# Patient Record
Sex: FEMALE | Race: WHITE | NOT HISPANIC OR LATINO | Employment: OTHER | URBAN - METROPOLITAN AREA
[De-identification: names, ages, dates, MRNs, and addresses within clinical notes are randomized per-mention and may not be internally consistent; named-entity substitution may affect disease eponyms.]

---

## 2017-03-07 ENCOUNTER — MYC MEDICAL ADVICE (OUTPATIENT)
Dept: FAMILY MEDICINE | Facility: CLINIC | Age: 62
End: 2017-03-07

## 2017-04-24 ENCOUNTER — MYC MEDICAL ADVICE (OUTPATIENT)
Dept: FAMILY MEDICINE | Facility: CLINIC | Age: 62
End: 2017-04-24

## 2017-04-24 NOTE — TELEPHONE ENCOUNTER
Routing to VIKTORIYA Guerra, .    Gaby-Please review and advise.    Thank you!  EDUIN SolisN, RN

## 2017-07-14 ENCOUNTER — MYC MEDICAL ADVICE (OUTPATIENT)
Dept: FAMILY MEDICINE | Facility: CLINIC | Age: 62
End: 2017-07-14

## 2017-07-14 DIAGNOSIS — H26.9 CATARACT, UNSPECIFIED CATARACT TYPE, UNSPECIFIED LATERALITY: Primary | ICD-10-CM

## 2017-07-14 NOTE — TELEPHONE ENCOUNTER
Writer responded as per below.    Dr. Garsia-Please review and advise.  Writer pended ophthalmology referral.    Thank you!  EDUIN SolisN, RN

## 2017-07-17 NOTE — TELEPHONE ENCOUNTER
RN - can you forward this to referral rep.  I can't seem to find a  Referral pool.      Perla Garsia MD

## 2017-07-18 NOTE — TELEPHONE ENCOUNTER
Referrals-  Can you follow up directly with pt regarding this referral info. Seems like you can respond to pt on mychart.  Thanks,  DONNA Holden

## 2017-07-18 NOTE — TELEPHONE ENCOUNTER
"Pt's insurance is [facility] and this is Open Access. She can self refer to Dr.Thomas Lozano at [facility] but we should not submit an approved order, from Dr Garsia because both provider and clinic is not in [facility]. Please direct pt's care to Dr Garsia's recommendation. \" I personally recommend [facility].  I use them a lot.\"    Jessa Chavez,  Referral Rep  "

## 2017-07-18 NOTE — TELEPHONE ENCOUNTER
"Spoke with pt. She acknowledge the MyChart message from Dr Garsia and will follow up with her recommendation to see someone at \"[facility]\" and cancel her appt with Dr Ulysses Lozano at [facility].  "

## 2017-07-20 ENCOUNTER — OFFICE VISIT (OUTPATIENT)
Dept: FAMILY MEDICINE | Facility: CLINIC | Age: 62
End: 2017-07-20
Payer: COMMERCIAL

## 2017-07-20 VITALS
BODY MASS INDEX: 31.03 KG/M2 | SYSTOLIC BLOOD PRESSURE: 123 MMHG | HEIGHT: 65 IN | HEART RATE: 86 BPM | RESPIRATION RATE: 12 BRPM | OXYGEN SATURATION: 98 % | TEMPERATURE: 98.5 F | DIASTOLIC BLOOD PRESSURE: 75 MMHG | WEIGHT: 186.25 LBS

## 2017-07-20 DIAGNOSIS — G57.62 MORTON'S NEUROMA OF SECOND INTERSPACE OF LEFT FOOT: ICD-10-CM

## 2017-07-20 DIAGNOSIS — D17.1 LIPOMA OF ANTERIOR CHEST WALL: Primary | ICD-10-CM

## 2017-07-20 PROCEDURE — 99213 OFFICE O/P EST LOW 20 MIN: CPT | Performed by: FAMILY MEDICINE

## 2017-07-20 NOTE — NURSING NOTE
"Chief Complaint   Patient presents with     Musculoskeletal Problem       Initial /75 (BP Location: Right arm, Patient Position: Chair, Cuff Size: Adult Regular)  Pulse 86  Temp 98.5  F (36.9  C) (Oral)  Resp 12  Ht 5' 4.5\" (1.638 m)  Wt 186 lb 4 oz (84.5 kg)  LMP 03/01/2008  SpO2 98%  Breastfeeding? No  BMI 31.48 kg/m2 Estimated body mass index is 31.48 kg/(m^2) as calculated from the following:    Height as of this encounter: 5' 4.5\" (1.638 m).    Weight as of this encounter: 186 lb 4 oz (84.5 kg).  Medication Reconciliation: complete     Graciela Rubio MA      "

## 2017-07-20 NOTE — PROGRESS NOTES
"  SUBJECTIVE:                                                    Nenita Goins is a 62 year old female who presents to clinic today for the following health issues:      Musculoskeletal problem/pain      Duration:  Since last fall.    Description  Location: Left foot    Intensity:  Moderate discomfort    Accompanying signs and symptoms: 2nd and 3rd toes have spread apart - especially when standing    History  Previous similar problem: no   Previous evaluation:  none    Precipitating or alleviating factors:  Trauma or overuse: no   Aggravating factors include: weight-bearing    Therapies tried and outcome: shiatsu therapy and rub and massage.       Lipoma on left chest overlying upper inner quadrant of left breast  Breast clinic has previously told her it was a lipoma.  Concerned that it is growing/enlarging      Problem list and histories reviewed & adjusted, as indicated.  Additional history: as documented      BP Readings from Last 3 Encounters:   07/20/17 123/75   10/27/16 122/80   02/11/16 131/79    Wt Readings from Last 3 Encounters:   07/20/17 186 lb 4 oz (84.5 kg)   10/27/16 185 lb (83.9 kg)   02/02/16 182 lb (82.6 kg)            Reviewed and updated as needed this visit by clinical staff  Tobacco  Meds             OBJECTIVE:     /75 (BP Location: Right arm, Patient Position: Chair, Cuff Size: Adult Regular)  Pulse 86  Temp 98.5  F (36.9  C) (Oral)  Resp 12  Ht 5' 4.5\" (1.638 m)  Wt 186 lb 4 oz (84.5 kg)  LMP 03/01/2008  SpO2 98%  Breastfeeding? No  BMI 31.48 kg/m2  Body mass index is 31.48 kg/(m^2).  GEN:  no apparent distress  SKIN: superficial (dermal), soft, mobile mass overlying left breast upper inner quadrant - approx silver-dollar sized  FOOT:  left foot with no swelling, no warmth, no redness.  There is focal tenderness to palpation in the distal 2nd intermetatarsal space.  There is wide spread of 2nd and 3rd toes when she stands.       ASSESSMENT/PLAN:       1. Lipoma of anterior " chest wall  Discussed that this is a common finding and not a breast finding but given that it is enlarging I will have her see Derm.  - DERMATOLOGY REFERRAL    2. 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  Referred to podiatry.   - PODIATRY/FOOT & ANKLE SURGERY REFERRAL      Perla Garsia MD  [facility]

## 2017-07-20 NOTE — MR AVS SNAPSHOT
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 - [place] [phone]   [email or web address]    Please be aware that coverage of these services is subject to the terms and limitations of your health insurance plan.  Call member services at your health plan with any benefit or coverage questions.      Please bring the following to your appointment:  Any x-rays, CTs or MRIs which have been performed.  Contact the facility where they were done to arrange for  prior to your scheduled appointment.  Any new CT, MRI or other procedures ordered by your specialist must be performed at a [place] facility or coordinated by your clinic's referral office.    List of current medications   This referral request   Any documents/labs given to you for this referral            PODIATRY/FOOT & ANKLE SURGERY REFERRAL       Your provider has referred you to: [facility]: [facility] [phone]   [email or web address]    Please be aware that coverage of these services is subject to the terms and limitations of your health insurance plan.  Call member services at your health plan with any benefit or coverage questions.      Please bring the following to your appointment:  >>   Any x-rays, CTs or MRIs which have been performed.  Contact the facility where they were  "done to arrange for  prior to your scheduled appointment.  Any new CT, MRI or other procedures ordered by your specialist must be performed at a [facility] facility or coordinated by your clinic's referral office.    >>   List of current medications   >>   This referral request   >>   Any documents/labs given to you for this referral                  Who to contact     If you have questions or need follow up information about today's clinic visit or your schedule please contact [facility] directly at [phone].  Normal or non-critical lab and imaging results will be communicated to you by [employer], letter or phone within 4 business days after the clinic has received the results. If you do not hear from us within 7 days, please contact the clinic through [employer] or phone. If you have a critical or abnormal lab result, we will notify you by phone as soon as possible.  Submit refill requests through [employer] or call your pharmacy and they will forward the refill request to us. Please allow 3 business days for your refill to be completed.          Additional Information About Your Visit        [employer] Information     [employer] gives you secure access to your electronic health record. If you see a primary care provider, you can also send messages to your care team and make appointments. If you have questions, please call your primary care clinic.  If you do not have a primary care provider, please call [phone] and they will assist you.        Care EveryWhere ID     This is your Care EveryWhere ID. This could be used by other organizations to access your [facility] medical records  [id number]        Your Vitals Were     Pulse Temperature Respirations Height Last Period Pulse Oximetry    86 98.5  F (36.9  C) (Oral) 12 5' 4.5\" (1.638 m) 03/01/2008 98%    Breastfeeding? BMI (Body Mass Index)                No 31.48 kg/m2           Blood Pressure from Last 3 Encounters:   07/20/17 123/75   10/27/16 " 122/80   02/11/16 131/79    Weight from Last 3 Encounters:   07/20/17 186 lb 4 oz (84.5 kg)   10/27/16 185 lb (83.9 kg)   02/02/16 182 lb (82.6 kg)              We Performed the Following     DERMATOLOGY REFERRAL     PODIATRY/FOOT & ANKLE SURGERY REFERRAL        Primary Care Provider Office Phone # Fax #    Perlajosé miguel Garsia [phone]       [facility] [address]  [facility] [address]        Equal Access to Services     [facility]: Hadii aad ku hadasho Soomaali, waaxda luqadaha, qaybta kaalmada adeegyada, waxleena puga . So [facility] [phone].    ATENCIÓN: Si habla español, tiene a rogers disposición servicios gratuitos de asistencia lingüística. Yoselyn al [phone].    We comply with applicable federal civil rights laws and [place] laws. We do not discriminate on the basis of race, color, national origin, age, disability sex, sexual orientation or gender identity.            Thank you!     Thank you for choosing [facility]  for your care. Our goal is always to provide you with excellent care. Hearing back from our patients is one way we can continue to improve our services. Please take a few minutes to complete the written survey that you may receive in the mail after your visit with us. Thank you!             Your Updated Medication List - Protect others around you: Learn how to safely use, store and throw away your medicines at www.disposemymeds.org.          This list is accurate as of: 7/20/17 10:18 AM.  Always use your most recent med list.                   Brand Name Dispense Instructions for use Diagnosis    calcium & magnesium carbonates 311-232 MG Tabs           COENZYME Q-10 PO      Take  by mouth.        DANDELION ROOT PO           magnesium citrate solution      Take 200 mLs by mouth once        zinc sulfate 220 (50 ZN) MG capsule    ZINCATE     Take 50 mg by mouth daily

## 2017-08-02 ENCOUNTER — TRANSFERRED RECORDS (OUTPATIENT)
Dept: HEALTH INFORMATION MANAGEMENT | Facility: CLINIC | Age: 62
End: 2017-08-02

## 2017-08-03 ENCOUNTER — OFFICE VISIT (OUTPATIENT)
Dept: PODIATRY | Facility: CLINIC | Age: 62
End: 2017-08-03
Payer: COMMERCIAL

## 2017-08-03 VITALS — WEIGHT: 186 LBS | BODY MASS INDEX: 30.99 KG/M2 | HEIGHT: 65 IN | HEART RATE: 78 BPM

## 2017-08-03 DIAGNOSIS — M77.8 CAPSULITIS OF LEFT FOOT: Primary | ICD-10-CM

## 2017-08-03 DIAGNOSIS — M20.12 HALLUX ABDUCTO VALGUS, LEFT: ICD-10-CM

## 2017-08-03 DIAGNOSIS — M20.62 TOE DEFORMITY, LEFT: ICD-10-CM

## 2017-08-03 PROCEDURE — 99243 OFF/OP CNSLTJ NEW/EST LOW 30: CPT | Performed by: PODIATRIST

## 2017-08-03 NOTE — PATIENT INSTRUCTIONS
DR. MARTINEZ'S CLINIC LOCATIONS     MONDAY / FRIDAY - [facility] WEDNESDAY - [place]   [address] [place] [address] [address] [place] [address] D[place]IO Santos [address]   [phone] / [phone] / [phone]       THURSDAY - [place] SCHEDULE SURGERY: [phone]   [address] APPOINTMENTS: [phone]   [place] [address] AFTER HOURS: [phone]   [phone] / [phone] BILLING QUESTIONS: [phone]      ZIMMERMAN'S NEUROMA   Zimmerman's neuroma is an enlargement or thickening of a nerve in the foot. It is also sometimes referred to as an intermetatarsal neuroma, interdigital neuroma, Zimmerman's metatarsalgia (pain in the metatarsal head area), akila-neural fibrosis (scar tissue around a nerve) or entrapment neuropathy (abnormal nerve due to compression). A Zimmerman's neuroma most commonly occurs in the third interspace between the third and fourth toes, followed by the second interspace between the second and third toes. Zimmerman's neuromas have also occurred in the fourth and first interspaces, but these are rare. If you have a Zimmerman's neuroma, there is a 15% chance it will occur bilaterally (on both feet). Zimmerman's neuromas occur most commonly in women who are between 30 to 50 years old. The reason they are more common in women is thought to be due to the shoes women wear.   CAUSES: A Zimmerman's neuroma is thought to be caused by trauma to the nerve, but scientists are still not sure about the exact cause of the trauma. The trauma may be caused by the metatarsal heads, the deep transverse intermetatarsal ligament (holds the metatarsal heads together) or an intermetatarsal bursa (fluid-filled sac). All of these structures can cause compression/trauma on the nerve which initially causes swelling and injury in the nerve. Over time if the compression/trauma continues, the nerve repairs itself with very fibrous tissue that leads to enlargement and thickening of the nerve. Other causes  "of trauma to the nerve may include; overpronation (foot rolls inward), hypermobility (too much motion), cavo varus (high arch foot) and excessive dorsiflexion (toes bend upward) of the toes. These biomechanical (howthe foot moves) factors may cause trauma to the nerve with every step. If the nerve becomes irritated and enlarged then it takes up more space and gets even more compressed and irritated. It becomes a vicious cycle.   SIGNS & SYMPTOMS  - Pain (sharp, stabbing, throbbing, shooting)   - Numbness   - Tingling or \"pins & needles\"   - Burning   - Cramping   - Feeling that you are stepping on something or that something is in your shoe   - Initially the symptoms may happen once in a while, but as the condition gets     worse, the symptoms may happen all of the time   - It usually feels better by taking off your shoe and massaging your foot     DIAGNOSIS: Your podiatrist will ask many questions about your signs and symptoms and will perform a physical exam. Some of the exams may include a web space compression test. This is done by squeezing the metatarsals together with one hand and using the thumb and index finger of the other hand to compress the affected web space to reproduce the pain/symptoms. A palpable click (Shaun's click) is usually present. This test may also cause pain to shoot into the toes and that is called a Tinel's sign. Reed's test involves squeezing the metatarsals together and moving the toes up and down for 30 seconds. This will usually cause pain or it will bring on your other symptoms. Parson's sign is positive when you stand and the affected toes spread apart. A Zimmerman's neuroma is usually diagnosed based on the history and physical exam findings, but sometimes other tests such as an x-ray, ultrasound or an MRI are needed.   TREATMENT  1.  Footwear Changes: Wear shoes that are wide and deep in the toe box so they  do not put pressure on your toes and metatarsals. Avoid wearing high " heels because they cause increased pressure on the ball of your foot (forefoot).    2.  Metatarsal Pads: These help to lift and separate the metatarsal heads to take pressure off of the nerve. They are placed just behind where you feel the pain, not on top of the painful spot.   3.  Activity modification: For example, you may try swimming instead of running until your symptoms go away.   4.  Taping   5.  Icing   6.  NSAIDs (anti-inflammatories): aleve, ibuprofen, etc.   7.  Arch Supports or Orthotics: These help to control some of the abnormal motion in your feet. The abnormal motion can lead to extra torque and pressure on the nerve.   8.  Physical Therapy  9.  Cortisone Injection: Helps to decrease the size of the irritated, enlarged nerve.   10.  Sclerosing Alcohol Injection: Helps to destroy the nerve chemically, which causes permanent numbness    SURGERY  If conservative treatment does not help surgery may be needed. Surgery may involve cutting out the nerve or cutting the intermetatarsalligament. Studies have shown surgery has an 80-85% success rate.  Will result in numbness    PREVENTION  -Avoid wearing narrow, pointed toe shoes, or high heels    CAPSULITIS / METATARSALGIA  All joints in the body are surrounded by a capsule, or a covering of soft tissue and ligaments. The capsule holds bones together and secretes joint fluid to help lubricate the joint.  If a joint capsule is exposed to excessive force, it can develop microscopic tears and become inflamed. This commonly occurs in the foot due to mild variation in anatomy. Hammertoes, bunions, irregular bone length, joint immobility, etc. can all lead to excessive force on the joint. Capsule injury can also occur due to repetitive stress from exercise, insufficient support from shoes, excessive bare foot walking and excessive weight.      Conservative treatments include ice, rest from the aggravating activity, weight loss, orthotic inserts, improving shoes and  shoe modifications. Appropriate shoes will protect the inflamed tissue improving the chances of healing. Avoidance of standing or walking barefoot, including around the house, is necessary to allow healing. Casts are sometimes used for more aggressive protection.  NSAIDs such as Advil are also used to help with pain and decreasing inflammation. If pain continues over a period of weeks with continuous rest and icing, Corticosteroid injections can be a treatment option to try and help decrease inflammation.    Surgery is often necessary to correct the underlying structural problem. Surgery might include shortening an excessively long bone, repairing bunion or hammertoe, lengthening a tight Achilles  tendon, etc. These are same day surgeries that might be pursued if more conservative measures fail to provide relief.      The inflamed joint capsule has the potential to completely tear. This will allow the toe to drift off the ground, curving toward the other toes. The involved toe may under or overlap the adjacent toes as drift continues. The pain may improve after the joint tears or this new position will be permanent. Surgery can address the toe alignment. Your goal of treating capsulitis is to avoid this scenario.              Body Mass Index (BMI)  Many things can cause foot and ankle problems. Foot structure, activity level, foot mechanics and injuries are common causes of pain.  One very important issue that often goes unmentioned, is body weight.  Extra weight can cause increased stress on muscles, ligaments, bones and tendons.  Sometimes just a few extra pounds is all it takes to put one over her/his threshold. Without reducing that stress, it can be difficult to alleviate pain. Some people are uncomfortable addressing this issue, but we feel it is important for you to think about it. As Foot &  Ankle specialists, our job is addressing the lower extremity problem and possible causes. Regarding extra body weight,  we encourage patients to discuss diet and weight management plans with their primary care doctors. It is this team approach that gives you the best opportunity for pain relief and getting you back on your feet.

## 2017-08-03 NOTE — NURSING NOTE
"Chief Complaint   Patient presents with     Consult     Foot Pain     bilateral / no injury       Initial Pulse 78  Ht 5' 4.5\" (1.638 m)  Wt 186 lb (84.4 kg)  LMP 03/01/2008  BMI 31.43 kg/m2 Estimated body mass index is 31.43 kg/(m^2) as calculated from the following:    Height as of this encounter: 5' 4.5\" (1.638 m).    Weight as of this encounter: 186 lb (84.4 kg).  Medication Reconciliation: complete    "

## 2017-08-03 NOTE — LETTER
8/3/2017         RE: Nenita Goins  [address]   [facility] [id number]        Dear Colleague,    Thank you for referring your patient, Nenita Goins, to the [facility]. Please see a copy of my visit note below.      ASSESSMENT/PLAN:    Encounter Diagnoses   Name Primary?     Capsulitis of left 2nd MTPJ Yes     Hallux abducto valgus, left      Toe deformity, left      I explained how the bunion and the longer 2nd metatarsal bone contribute to the overload of the joint. This is also related to the destabilization and medial drift of the left 2nd toe.  Less likely a neuroma.  Regional anatomy discussed.     Stiff soled shoes  Avoidance of barefoot walking  Metatarsal pad - one was provided  Quality over the counter insert versus custom orthoses discussed.     Follow up as needed.       Body mass index is 31.43 kg/(m^2).    Weight management plan: Patient was referred to their PCP to discuss a diet and exercise plan.      Abram Dennis DPM, FACFAS, MS    [place] [facility]Foot & Ankle [facility]      ____________________________________________________________________    HPI:       I was asked by Dr. Garsia to evaluate this patient regarding a possible Zimmerman's neuroma, left foot (see her clinic note from 17).     Chief Complaint: Zimmerman's neuroma, left foot; toes are   Onset of problem: 10 months  Pain/ discomfort is described as:  Occasional burning  Ratin/10   Frequency:  3-4x/ week    The pain is made worse with barefoot walking  Previous treatment: purchased better shoes.   *  Past Medical History:   Diagnosis Date     Allergic rhinitis     multiple chemical sensitivities     Anxiety disorder      Mild intermittent asthma      Uterine fibroids     multiple large fibroids on US 06   *  *  Past Surgical History:   Procedure Laterality Date     ESOPHAGOSCOPY, GASTROSCOPY, DUODENOSCOPY (EGD), COMBINED  2013    Procedure: COMBINED  "ESOPHAGOSCOPY, GASTROSCOPY, DUODENOSCOPY (EGD), BIOPSY SINGLE OR MULTIPLE;  COMBINED ESOPHAGOSCOPY, GASTROSCOPY, DUODENOSCOPY (EGD);  Surgeon: Carlos Jones MD;  Location:  GI     STRESS ECHO ([facility])      in [place] - [facility]     TONSILLECTOMY & ADENOIDECTOMY      T & A   *  *  Current Outpatient Prescriptions   Medication Sig Dispense Refill     calcium & magnesium carbonates 311-232 MG TABS        zinc sulfate (ZINCATE) 220 MG capsule Take 50 mg by mouth daily       magnesium citrate solution Take 200 mLs by mouth once       COENZYME Q-10 PO Take  by mouth.       Misc Natural Products (DANDELION ROOT PO)          ROS:     A 10-point review of systems was performed and is positive for that noted in the HPI and as seen below.  All other areas are negative.     Numbness in feet?  no   Calf pain with walking? no  Recent foot/ankle injury? no  Weight change over past 12 months? 5# gain  Self perception as overweight? yes  Recent flu-like symptoms? no  Joint pain other than feet ? Hips, hands    Social History: Employment:  no;  Exercise/Physical activity:  Gardening, walking, \"something daily\";  Tobacco use:  no  Social History     Social History     Marital status: Significant other     Spouse name: Susu Lujan     Number of children: 0     Years of education: 23-24     Occupational History     co op worker/student [employer]     Social History Main Topics     Smoking status: Never Smoker     Smokeless tobacco: Never Used     Alcohol use No      Comment: 1-2 drinks per month     Drug use: No     Sexual activity: Not Currently     Partners: Female     Other Topics Concern     Parent/Sibling W/ Cabg, Mi Or Angioplasty Before 65f 55m? Yes     father  at age 52     Social History Narrative    09    Balanced Diet - Yes    Osteoporosis Prevention Measures - Dairy servings per day: 1 and Medication/Supplements (See current meds)    Regular Exercise -  Yes Describe yoga 4 times per week    Dental Exam " "- YES - Date: 2008    Eye Exam -  2-3 years ago    Self Breast Exam - Yes, on a monthly basis when she remembers    Abuse: Current or Past (Physical, Sexual or Emotional)- No    Do you feel safe in your environment - Yes    Guns stored in the home - No    Sunscreen used - No    Seatbelts used - Yes    Lipids -  YES - Date: 06    Glucose -  YES - Date: 06    Colon Cancer Screening - No    Hemoccults - NO    Pap Test -  YES - Date: 06    Do you have any concerns about STD's -  No    Mammography - YES - Date: 08    DEXA - NO    Immunizations reviewed and up to date - Yes, Td given 00    Paris Garcia MA                   Family history:  Family History   Problem Relation Age of Onset     C.A.D. Father      Arthritis Mother      CANCER Paternal Grandmother      liver cancer     Lipids Father      Lipids Sister      Lipids Sister      Coronary Artery Disease Father      , age 52     Hypertension Mother      , age 83     Hypertension Maternal Grandmother      , age 96     Hyperlipidemia Sister      CEREBROVASCULAR DISEASE Mother      atrial fibrulation     CEREBROVASCULAR DISEASE Other      Breast Cancer Paternal Grandmother      , age 67     MENTAL ILLNESS Father      MENTAL ILLNESS Paternal Grandmother      Anesthesia Reaction Sister      Asthma Sister      Asthma Sister      Thyroid Disease Mother      DIABETES Other      maternal great grandma     Hyperlipidemia No family hx of      family history is unknown     CEREBROVASCULAR DISEASE Other      maternal cousin     Gallbladder Disease Father      and mgm       Rheumatoid arthritis:  no  Foot Problems: parent and sibling and foot injuries  Diabetes: no      EXAM:    Vitals: Pulse 78  Ht 5' 4.5\" (1.638 m)  Wt 186 lb (84.4 kg)  LMP 2008  BMI 31.43 kg/m2  BMI: Body mass index is 31.43 kg/(m^2).  Height: 5' 4.5\"    Constitutional/ general:  Pt is in no apparent distress, appears well-nourished.  " Cooperative with history and physical exam.     Vascular:  Pedal pulses are palpable bilaterally for both the DP and PT arteries.  CFT < 3 sec.  No edema.  Pedal hair growth noted.     Neuro:  Alert and oriented x 3. Coordinated gait.  Light touch sensation is intact to the L4, L5, S1 distributions. No obvious deficits.  No evidence of neurological-based weakness, spasticity, or contracture in the lower extremities.     Derm: Normal texture and turgor.  No erythema, ecchymosis, or cyanosis.  No open lesions.     Musculoskeletal:    Lower extremity muscle strength is normal.  Patient is ambulatory without an assistive device or brace .  Hallux abducto valgus deformity, left foot.  Reducible in the transverse plane with preserved sagittal plane ROM.  No crepitus.  On palpatory exam, the left 2nd metatarsal is longer than the first.  Medial deviation of the left 2nd toe.  Pain on palpation to the plantar left 2nd metatarsophalangeal joint.       Abram Dennis DPM, FACRAMEZ, MS    [place] [facility] Pod[facility]/Foot & Ankle Surgery                Again, thank you for allowing me to participate in the care of your patient.        Sincerely,        Abram Dennis DPM

## 2017-08-03 NOTE — PROGRESS NOTES
ASSESSMENT/PLAN:    Encounter Diagnoses   Name Primary?     Capsulitis of left 2nd MTPJ Yes     Hallux abducto valgus, left      Toe deformity, left      I explained how the bunion and the longer 2nd metatarsal bone contribute to the overload of the joint. This is also related to the destabilization and medial drift of the left 2nd toe.  Less likely a neuroma.  Regional anatomy discussed.     Stiff soled shoes  Avoidance of barefoot walking  Metatarsal pad - one was provided  Quality over the counter insert versus custom orthoses discussed.     Follow up as needed.       Body mass index is 31.43 kg/(m^2).    Weight management plan: Patient was referred to their PCP to discuss a diet and exercise plan.      Abram Dennis DPM, FACFAS, MS    [place] [facility] Pod[facility]/Foot & Ankle Surgery      ____________________________________________________________________    HPI:       I was asked by Dr. Garsia to evaluate this patient regarding a possible Zimmerman's neuroma, left foot (see her clinic note from 17).     Chief Complaint: Zimmerman's neuroma, left foot; toes are   Onset of problem: 10 months  Pain/ discomfort is described as:  Occasional burning  Ratin/10   Frequency:  3-4x/ week    The pain is made worse with barefoot walking  Previous treatment: purchased better shoes.   *  Past Medical History:   Diagnosis Date     Allergic rhinitis     multiple chemical sensitivities     Anxiety disorder      Mild intermittent asthma      Uterine fibroids     multiple large fibroids on  06   *  *  Past Surgical History:   Procedure Laterality Date     ESOPHAGOSCOPY, GASTROSCOPY, DUODENOSCOPY (EGD), COMBINED  2013    Procedure: COMBINED ESOPHAGOSCOPY, GASTROSCOPY, DUODENOSCOPY (EGD), BIOPSY SINGLE OR MULTIPLE;  COMBINED ESOPHAGOSCOPY, GASTROSCOPY, DUODENOSCOPY (EGD);  Surgeon: Carlos Jones MD;  Location:  GI     STRESS ECHO ([facility])      in [place] - [facility]     TONSILLECTOMY & ADENOIDECTOMY  "     T & A   *  *  Current Outpatient Prescriptions   Medication Sig Dispense Refill     calcium & magnesium carbonates 311-232 MG TABS        zinc sulfate (ZINCATE) 220 MG capsule Take 50 mg by mouth daily       magnesium citrate solution Take 200 mLs by mouth once       COENZYME Q-10 PO Take  by mouth.       Misc Natural Products (DANDELION ROOT PO)          ROS:     A 10-point review of systems was performed and is positive for that noted in the HPI and as seen below.  All other areas are negative.     Numbness in feet?  no   Calf pain with walking? no  Recent foot/ankle injury? no  Weight change over past 12 months? 5# gain  Self perception as overweight? yes  Recent flu-like symptoms? no  Joint pain other than feet ? Hips, hands    Social History: Employment:  no;  Exercise/Physical activity:  Gardening, walking, \"something daily\";  Tobacco use:  no  Social History     Social History     Marital status: Significant other     Spouse name: Susu Lujan     Number of children: 0     Years of education: 23-24     Occupational History     co op worker/student [employer]     Social History Main Topics     Smoking status: Never Smoker     Smokeless tobacco: Never Used     Alcohol use No      Comment: 1-2 drinks per month     Drug use: No     Sexual activity: Not Currently     Partners: Female     Other Topics Concern     Parent/Sibling W/ Cabg, Mi Or Angioplasty Before 65f 55m? Yes     father  at age 52     Social History Narrative    09    Balanced Diet - Yes    Osteoporosis Prevention Measures - Dairy servings per day: 1 and Medication/Supplements (See current meds)    Regular Exercise -  Yes Describe yoga 4 times per week    Dental Exam - YES - Date: 2008    Eye Exam -  2-3 years ago    Self Breast Exam - Yes, on a monthly basis when she remembers    Abuse: Current or Past (Physical, Sexual or Emotional)- No    Do you feel safe in your environment - Yes    Guns stored in the home - No    " "Sunscreen used - No    Seatbelts used - Yes    Lipids -  YES - Date: 06    Glucose -  YES - Date: 06    Colon Cancer Screening - No    Hemoccults - NO    Pap Test -  YES - Date: 06    Do you have any concerns about STD's -  No    Mammography - YES - Date: 08    DEXA - NO    Immunizations reviewed and up to date - Yes, Td given 00    Paris Garcia MA                   Family history:  Family History   Problem Relation Age of Onset     C.A.D. Father      Arthritis Mother      CANCER Paternal Grandmother      liver cancer     Lipids Father      Lipids Sister      Lipids Sister      Coronary Artery Disease Father      , age 52     Hypertension Mother      , age 83     Hypertension Maternal Grandmother      , age 96     Hyperlipidemia Sister      CEREBROVASCULAR DISEASE Mother      atrial fibrulation     CEREBROVASCULAR DISEASE Other      Breast Cancer Paternal Grandmother      , age 67     MENTAL ILLNESS Father      MENTAL ILLNESS Paternal Grandmother      Anesthesia Reaction Sister      Asthma Sister      Asthma Sister      Thyroid Disease Mother      DIABETES Other      maternal great grandma     Hyperlipidemia No family hx of      family history is unknown     CEREBROVASCULAR DISEASE Other      maternal cousin     Gallbladder Disease Father      and mgm       Rheumatoid arthritis:  no  Foot Problems: parent and sibling and foot injuries  Diabetes: no      EXAM:    Vitals: Pulse 78  Ht 5' 4.5\" (1.638 m)  Wt 186 lb (84.4 kg)  LMP 2008  BMI 31.43 kg/m2  BMI: Body mass index is 31.43 kg/(m^2).  Height: 5' 4.5\"    Constitutional/ general:  Pt is in no apparent distress, appears well-nourished.  Cooperative with history and physical exam.     Vascular:  Pedal pulses are palpable bilaterally for both the DP and PT arteries.  CFT < 3 sec.  No edema.  Pedal hair growth noted.     Neuro:  Alert and oriented x 3. Coordinated gait.  Light touch sensation is intact " to the L4, L5, S1 distributions. No obvious deficits.  No evidence of neurological-based weakness, spasticity, or contracture in the lower extremities.     Derm: Normal texture and turgor.  No erythema, ecchymosis, or cyanosis.  No open lesions.     Musculoskeletal:    Lower extremity muscle strength is normal.  Patient is ambulatory without an assistive device or brace .  Hallux abducto valgus deformity, left foot.  Reducible in the transverse plane with preserved sagittal plane ROM.  No crepitus.  On palpatory exam, the left 2nd metatarsal is longer than the first.  Medial deviation of the left 2nd toe.  Pain on palpation to the plantar left 2nd metatarsophalangeal joint.       Abram Dennis DPM, FACRAMEZ, MS    [place] [facility] of Podiatry/Foot & Ankle Surgery

## 2017-08-03 NOTE — MR AVS SNAPSHOT
After Visit Summary   8/3/2017    Nenita Goins    MRN: [id number]           Patient Information     Date Of Birth          1955        Visit Information        Provider Department      8/3/2017 8:45 AM Abram Martinez DPM [facility]        Care Instructions    DR. MARTINEZ'S CLINIC LOCATIONS     MONDAY / FRIDAY - [facility] WEDNESDAY - [facility]   [address] [place] [address] [place] [address]   [phone] / [phone] / [phone]       [facility] [phone]   [address] APPOINTMENTS: [phone]   [place] [address] AFTER HOURS: [phone]   [phone] / [phone] BILLING QUESTIONS: [phone]      ZIMMERMAN'S NEUROMA   Zimmerman's neuroma is an enlargement or thickening of a nerve in the foot. It is also sometimes referred to as an intermetatarsal neuroma, interdigital neuroma, Zimmerman's metatarsalgia (pain in the metatarsal head area), akila-neural fibrosis (scar tissue around a nerve) or entrapment neuropathy (abnormal nerve due to compression). A Zimmerman's neuroma most commonly occurs in the third interspace between the third and fourth toes, followed by the second interspace between the second and third toes. Zimmerman's neuromas have also occurred in the fourth and first interspaces, but these are rare. If you have a Zimmerman's neuroma, there is a 15% chance it will occur bilaterally (on both feet). Zimmerman's neuromas occur most commonly in women who are between 30 to 50 years old. The reason they are more common in women is thought to be due to the shoes women wear.   CAUSES: A Zimmerman's neuroma is thought to be caused by trauma to the nerve, but scientists are still not sure about the exact cause of the trauma. The trauma may be caused by the metatarsal heads, the deep transverse intermetatarsal ligament (holds the metatarsal heads together) or an intermetatarsal bursa  "(fluid-filled sac). All of these structures can cause compression/trauma on the nerve which initially causes swelling and injury in the nerve. Over time if the compression/trauma continues, the nerve repairs itself with very fibrous tissue that leads to enlargement and thickening of the nerve. Other causes of trauma to the nerve may include; overpronation (foot rolls inward), hypermobility (too much motion), cavo varus (high arch foot) and excessive dorsiflexion (toes bend upward) of the toes. These biomechanical (howthe foot moves) factors may cause trauma to the nerve with every step. If the nerve becomes irritated and enlarged then it takes up more space and gets even more compressed and irritated. It becomes a vicious cycle.   SIGNS & SYMPTOMS  - Pain (sharp, stabbing, throbbing, shooting)   - Numbness   - Tingling or \"pins & needles\"   - Burning   - Cramping   - Feeling that you are stepping on something or that something is in your shoe   - Initially the symptoms may happen once in a while, but as the condition gets     worse, the symptoms may happen all of the time   - It usually feels better by taking off your shoe and massaging your foot     DIAGNOSIS: Your podiatrist will ask many questions about your signs and symptoms and will perform a physical exam. Some of the exams may include a web space compression test. This is done by squeezing the metatarsals together with one hand and using the thumb and index finger of the other hand to compress the affected web space to reproduce the pain/symptoms. A palpable click (Shaun's click) is usually present. This test may also cause pain to shoot into the toes and that is called a Tinel's sign. Reed's test involves squeezing the metatarsals together and moving the toes up and down for 30 seconds. This will usually cause pain or it will bring on your other symptoms. Parson's sign is positive when you stand and the affected toes spread apart. A Zimmerman's neuroma " is usually diagnosed based on the history and physical exam findings, but sometimes other tests such as an x-ray, ultrasound or an MRI are needed.   TREATMENT  1.  Footwear Changes: Wear shoes that are wide and deep in the toe box so they  do not put pressure on your toes and metatarsals. Avoid wearing high heels because they cause increased pressure on the ball of your foot (forefoot).    2.  Metatarsal Pads: These help to lift and separate the metatarsal heads to take pressure off of the nerve. They are placed just behind where you feel the pain, not on top of the painful spot.   3.  Activity modification: For example, you may try swimming instead of running until your symptoms go away.   4.  Taping   5.  Icing   6.  NSAIDs (anti-inflammatories): aleve, ibuprofen, etc.   7.  Arch Supports or Orthotics: These help to control some of the abnormal motion in your feet. The abnormal motion can lead to extra torque and pressure on the nerve.   8.  Physical Therapy  9.  Cortisone Injection: Helps to decrease the size of the irritated, enlarged nerve.   10.  Sclerosing Alcohol Injection: Helps to destroy the nerve chemically, which causes permanent numbness    SURGERY  If conservative treatment does not help surgery may be needed. Surgery may involve cutting out the nerve or cutting the intermetatarsalligament. Studies have shown surgery has an 80-85% success rate.  Will result in numbness    PREVENTION  -Avoid wearing narrow, pointed toe shoes, or high heels    CAPSULITIS / METATARSALGIA  All joints in the body are surrounded by a capsule, or a covering of soft tissue and ligaments. The capsule holds bones together and secretes joint fluid to help lubricate the joint.  If a joint capsule is exposed to excessive force, it can develop microscopic tears and become inflamed. This commonly occurs in the foot due to mild variation in anatomy. Hammertoes, bunions, irregular bone length, joint immobility, etc. can all lead to  excessive force on the joint. Capsule injury can also occur due to repetitive stress from exercise, insufficient support from shoes, excessive bare foot walking and excessive weight.      Conservative treatments include ice, rest from the aggravating activity, weight loss, orthotic inserts, improving shoes and shoe modifications. Appropriate shoes will protect the inflamed tissue improving the chances of healing. Avoidance of standing or walking barefoot, including around the house, is necessary to allow healing. Casts are sometimes used for more aggressive protection.  NSAIDs such as Advil are also used to help with pain and decreasing inflammation. If pain continues over a period of weeks with continuous rest and icing, Corticosteroid injections can be a treatment option to try and help decrease inflammation.    Surgery is often necessary to correct the underlying structural problem. Surgery might include shortening an excessively long bone, repairing bunion or hammertoe, lengthening a tight Achilles  tendon, etc. These are same day surgeries that might be pursued if more conservative measures fail to provide relief.      The inflamed joint capsule has the potential to completely tear. This will allow the toe to drift off the ground, curving toward the other toes. The involved toe may under or overlap the adjacent toes as drift continues. The pain may improve after the joint tears or this new position will be permanent. Surgery can address the toe alignment. Your goal of treating capsulitis is to avoid this scenario.              Body Mass Index (BMI)  Many things can cause foot and ankle problems. Foot structure, activity level, foot mechanics and injuries are common causes of pain.  One very important issue that often goes unmentioned, is body weight.  Extra weight can cause increased stress on muscles, ligaments, bones and tendons.  Sometimes just a few extra pounds is all it takes to put one over her/his  threshold. Without reducing that stress, it can be difficult to alleviate pain. Some people are uncomfortable addressing this issue, but we feel it is important for you to think about it. As Foot &  Ankle specialists, our job is addressing the lower extremity problem and possible causes. Regarding extra body weight, we encourage patients to discuss diet and weight management plans with their primary care doctors. It is this team approach that gives you the best opportunity for pain relief and getting you back on your feet.                Follow-ups after your visit        Who to contact     If you have questions or need follow up information about today's clinic visit or your schedule please contact [facility] directly at [phone].  Normal or non-critical lab and imaging results will be communicated to you by [facility], letter or phone within 4 business days after the clinic has received the results. If you do not hear from us within 7 days, please contact the clinic through [facility] or phone. If you have a critical or abnormal lab result, we will notify you by phone as soon as possible.  Submit refill requests through [facility] or call your pharmacy and they will forward the refill request to us. Please allow 3 business days for your refill to be completed.          Additional Information About Your Visit        [facility] [employer] Information     [facility] gives you secure access to your electronic health record. If you see a primary care provider, you can also send messages to your care team and make appointments. If you have questions, please call your primary care clinic.  If you do not have a primary care provider, please call [phone] and they will assist you.        Care EveryWhere ID     This is your Care EveryWhere ID. This could be used by other organizations to access your [place] medical records  [id number]        Your Vitals Were     Pulse Height Last Period BMI (Body Mass Index)          78 5'  "4.5\" (1.638 m) 03/01/2008 31.43 kg/m2         Blood Pressure from Last 3 Encounters:   07/20/17 123/75   10/27/16 122/80   02/11/16 131/79    Weight from Last 3 Encounters:   08/03/17 186 lb (84.4 kg)   07/20/17 186 lb 4 oz (84.5 kg)   10/27/16 185 lb (83.9 kg)              Today, you had the following     No orders found for display       Primary Care Provider Office Phone # Fax #    Perla Garsia [phone]       [facility] [address]  [facility] [address]        Equal Access to Services     ANGELICA TREJO : Hadii tai augusteo [place], waaxda luqadaha, qaybta kaalmada adeashleighyada, maureen puga . So [facility] [phone].    ATENCIÓN: Si habla español, tiene a rogers disposición servicios gratuitos de asistencia lingüística. Llame al [phone].    We comply with applicable federal civil rights laws and [place] laws. We do not discriminate on the basis of race, color, national origin, age, disability sex, sexual orientation or gender identity.            Thank you!     Thank you for choosing [facility]  for your care. Our goal is always to provide you with excellent care. Hearing back from our patients is one way we can continue to improve our services. Please take a few minutes to complete the written survey that you may receive in the mail after your visit with us. Thank you!             Your Updated Medication List - Protect others around you: Learn how to safely use, store and throw away your medicines at www.disposemymeds.org.          This list is accurate as of: 8/3/17  9:11 AM.  Always use your most recent med list.                   Brand Name Dispense Instructions for use Diagnosis    calcium & magnesium carbonates 311-232 MG Tabs           COENZYME Q-10 PO      Take  by mouth.        DANDELION ROOT PO           magnesium citrate solution      Take 200 mLs by mouth once        zinc sulfate 220 (50 ZN) MG capsule    ZINCATE     " Take 50 mg by mouth daily

## 2017-09-14 ENCOUNTER — OFFICE VISIT (OUTPATIENT)
Dept: FAMILY MEDICINE | Facility: CLINIC | Age: 62
End: 2017-09-14
Payer: COMMERCIAL

## 2017-09-14 VITALS
OXYGEN SATURATION: 98 % | DIASTOLIC BLOOD PRESSURE: 84 MMHG | TEMPERATURE: 97.8 F | WEIGHT: 184.5 LBS | SYSTOLIC BLOOD PRESSURE: 119 MMHG | HEIGHT: 65 IN | BODY MASS INDEX: 30.74 KG/M2 | RESPIRATION RATE: 16 BRPM | HEART RATE: 80 BPM

## 2017-09-14 DIAGNOSIS — Z91.09 CHEMICAL SENSITIVITY: ICD-10-CM

## 2017-09-14 DIAGNOSIS — K21.9 GASTROESOPHAGEAL REFLUX DISEASE, ESOPHAGITIS PRESENCE NOT SPECIFIED: ICD-10-CM

## 2017-09-14 DIAGNOSIS — Z01.818 PREOP GENERAL PHYSICAL EXAM: Primary | ICD-10-CM

## 2017-09-14 DIAGNOSIS — F41.1 GAD (GENERALIZED ANXIETY DISORDER): ICD-10-CM

## 2017-09-14 DIAGNOSIS — H26.9 CATARACT OF BOTH EYES, UNSPECIFIED CATARACT TYPE: ICD-10-CM

## 2017-09-14 PROCEDURE — 99214 OFFICE O/P EST MOD 30 MIN: CPT | Performed by: FAMILY MEDICINE

## 2017-09-14 NOTE — PROGRESS NOTES
[facility]  [address] [place] [phone]  Dept: [phone]    PRE-OP EVALUATION:  Today's date: 2017    Nenita Goins (: 1955) presents for pre-operative evaluation assessment as requested by Dr. Ishan Gallegos.  She requires evaluation and anesthesia risk assessment prior to undergoing surgery/procedure for treatment of cataract.  Proposed procedure: phacoemulsification/IOL.    Date of Surgery/ Procedure: 17 - right eye  (left eye date TBD)  Time of Surgery/ Procedure: 8:15 am  Hospital/Surgical Facility: [facility]  Fax number for surgical facility: [phone]  Primary Physician: Perla Garsia  Type of Anesthesia Anticipated: to be determined    Patient has a Health Care Directive or Living Will:  YES received within [place].    Preop Questions 2017   1.  Do you have a history of heart attack, stroke, stent, bypass or surgery on an artery in the head, neck, heart or legs? No   2.  Do you ever have any pain or discomfort in your chest? YES - chronic   3.  Do you have a history of  Heart Failure? No   4.   Are you troubled by shortness of breath when:  walking on a level surface, or up a slight hill, or at night? No   5.  Do you currently have a cold, bronchitis or other respiratory infection? No   6.  Do you have a cough, shortness of breath, or wheezing? No   7.  Do you sometimes get pains in the calves of your legs when you walk? No   8. Do you or anyone in your family have previous history of blood clots? YES - embolic stroke 2/2 AFib with atrial clot   9.  Do you or does anyone in your family have a serious bleeding problem such as prolonged bleeding following surgeries or cuts? No   10. Have you ever had problems with anemia or been told to take iron pills? No   11. Have you had any abnormal blood loss such as black, tarry or bloody stools, or abnormal vaginal bleeding? No   12. Have you ever had a blood transfusion?  No   13. Have you or any of your relatives ever had problems with anesthesia? YES - sister had some issues   14. Do you have sleep apnea, excessive snoring or daytime drowsiness? No   15. Do you have any prosthetic heart valves? No   16. Do you have prosthetic joints? No   17. Is there any chance that you may be pregnant? No           HPI:                                                      Brief HPI related to upcoming procedure: worsening cataract - especially right eye      See problem list for active medical problems.  Problems all longstanding and stable, except as noted/documented.  See ROS for pertinent symptoms related to these conditions.                                                                                                    GERD Follow-Up - She uses OTC rolaids as needed but generally manages with diet control.    Multiple Chemical Sensitivities - She is anxious about potential chemical exposures during surgery.  She specifically has issues with scented products, petrol chemicals, and preservatives.  She also has various food intolerances (listed in her allergies).      MEDICAL HISTORY:                                                    Patient Active Problem List    Diagnosis Date Noted     Chemical sensitivity 09/14/2017     Priority: Medium     Multiple chemical sensitivities       THOMAS (generalized anxiety disorder) 02/11/2016     Priority: Medium     Advance care planning 02/11/2016     Priority: Medium     Advance Care Planning 2/11/2016: Receipt of ACP document:  Received: Health Care Directive which was witnessed or notarized on 11/3/15.  Document not previously scanned.  Validation form completed and sent with document to be scanned.  Code Status needs to be updated to reflect choices in most recent ACP document. Orders placed.  Confirmed/documented designated decision maker(s).  Added by Kimberli Palacio             Seborrheic keratoses 11/20/2015     Priority: Medium     Difficulty  hearing 03/12/2012     Priority: Medium     Evaluated by audiology, mild, no hearing aids needed       Lipoma of skin and subcutaneous tissue 03/09/2012     Priority: Medium     left upper medial breast, continue to check annually       GERD (gastroesophageal reflux disease) 05/11/2011     Priority: Medium     Dyslipidemia (high LDL; low HDL) 05/04/2011     Priority: Medium     Elevated triglycerides with high cholesterol 05/04/2011     Priority: Medium     Overweight (BMI 25.0-29.9) 03/30/2011     Priority: Medium     Allergic rhinitis      Priority: Medium     multiple chemical sensitivities       Uterine fibroid      Priority: Medium     multiple large fibroids on US 7/5/06. Not currently symptomatic.        Past Medical History:   Diagnosis Date     Allergic rhinitis     multiple chemical sensitivities     Anal fissure 11/20/2015     Anxiety disorder      Chemical sensitivity 9/14/2017    Multiple chemical sensitivities     Difficulty hearing 3/12/2012    Evaluated by audiology, mild, no hearing aids needed      Head injury 3/22/2013     Lactose intolerance      Mild intermittent asthma      Plantar tendonitis 11/20/2015     Uterine fibroid     multiple large fibroids on US 7/5/06. Not currently symptomatic.      Uterine fibroids     multiple large fibroids on US 7/5/06     Past Surgical History:   Procedure Laterality Date     ESOPHAGOSCOPY, GASTROSCOPY, DUODENOSCOPY (EGD), COMBINED  7/2/2013    Procedure: COMBINED ESOPHAGOSCOPY, GASTROSCOPY, DUODENOSCOPY (EGD), BIOPSY SINGLE OR MULTIPLE;  COMBINED ESOPHAGOSCOPY, GASTROSCOPY, DUODENOSCOPY (EGD);  Surgeon: Carlos Jones MD;  Location:  GI     STRESS ECHO ([facility])  6/06    in [place] - [facility]     TONSILLECTOMY & ADENOIDECTOMY      T & A     Current Outpatient Prescriptions   Medication Sig Dispense Refill     calcium & magnesium carbonates 311-232 MG TABS        COENZYME Q-10 PO Take  by mouth.       OTC products: None, except as noted above    Allergies  "  Allergen Reactions     Aspirin Difficulty breathing     Contrast Dye Rash     [place] Flavor Swelling     All radha     Papaya Swelling     Cats      Itchy eyes     Dust Mites      Ibuprofen Sodium Difficulty breathing     Latex Rash     Mold      Stuffiness     Peanuts [Nuts] Dermatitis     Peanuts, cashews and pistachios     Pollen Extract      Corn pollen and others     Chocolate      Ocular migraines     Corn-Related Products GI Disturbance     Diarrhea, abdominal pain     Food GI Disturbance     Potatoes, peppers, tomatoes      Latex Allergy: YES: Precautions to take: avoid latex    Social History   Substance Use Topics     Smoking status: Never Smoker     Smokeless tobacco: Never Used     Alcohol use No      Comment: 1-2 drinks per month     History   Drug Use No       REVIEW OF SYSTEMS:                                                      CONST: NEGATIVE for fevers/chills/sweats, unexplained weight loss/gain, and fatigue  EYES: POSITIVE for change in vision  ENT: POSITIVE for difficulty hearing (longstanding), and NEGATIVE for problems with teeth/gums  BREAST: NEGATIVE for breast lump/discharge  CV: NEGATIVE for chest pain/discomfort, leg pain with exercise, and palpitations  RESP: NEGATIVE for cough/wheeze, and difficulty breathing  GI: NEGATIVE for abdominal pain, blood in bowel movement, and nausea/vomiting/diarrhea  : NEGATIVE for nighttime urination, leaking urine, unusual vaginal bleeding, penile/vaginal discharge, and concerns about sexual function  MS: POSITIVE for muscle/joint pain - chronic, \"all over\"  SKIN: NEGATIVE for rash or mole change  NEURO: POSITIVE for headache (rarely), NEGATIVE for dizziness/lightheadedness, numbness, memory loss, and loss of coordination  PSYCH: NEGATIVE for anxiety/stress, problems with sleep, and depression  HEME: NEGATIVE for unexplained lumps, and easy bruising/bleeding  ENDO: NEGATIVE for excessive thirst or urination  ALL: POSITIVE for hay fever/allergies " "    EXAM:                                                    /84 (BP Location: Left arm, Patient Position: Chair, Cuff Size: Adult Regular)  Pulse 80  Temp 97.8  F (36.6  C) (Oral)  Resp 16  Ht 5' 4.5\" (1.638 m)  Wt 184 lb 8 oz (83.7 kg)  LMP 03/01/2008  SpO2 98%  Breastfeeding? No  BMI 31.18 kg/m2  GEN:  no apparent distress  EYES: PERRL, conjunctivae and sclerae clear   ENT: external ears and nose without lesions or scars, TM's and canals clear bilaterally, oropharynx clear with moist mucus membranes and normal landmarks and lips, teeth, and gums with no abnormalities noted   NECK:  Supple without adenopathy, mass, or thyromegaly   LUNGS:  normal respiratory effort, and lungs clear to auscultation bilaterally - no rales, rhonchi or wheezes  CV: regular rate and rhythm, normal S1 S2, no S3 or S4, no murmur, click or rub and bilateral lower extremities without edema   ABD:  soft, nontender, no mass, no hepatosplenomegaly, no hernias  SKIN:  normal to inspection and palpation, no rashes or abnormal-appearing lesions   PSYCH: mood/affect appear normal/bright     DIAGNOSTICS:                                                    No labs or EKG required for low risk surgery (cataract, skin procedure, breast biopsy, etc)    Recent Labs   Lab Test  10/27/16   1204  01/09/16   1030   HGB  14.8  15.5   PLT  273  254   NA   --   142   POTASSIUM   --   3.7   CR   --   0.79   A1C   --   5.5        IMPRESSION:                                                    Reason for surgery/procedure: cataracts  Diagnosis/reason for consult: preoperative assessment    The proposed surgical procedure is considered LOW risk.    REVISED CARDIAC RISK INDEX  The patient has the following serious cardiovascular risks for perioperative complications such as (MI, PE, VFib and 3  AV Block):  No serious cardiac risks  INTERPRETATION: 0 risks: Class I (very low risk - 0.4% complication rate)    The patient has the following additional " risks for perioperative complications:  No identified additional risks      ICD-10-CM    1. Preop general physical exam Z01.818    2. Cataract of both eyes, unspecified cataract type H26.9    3. Chemical sensitivity Z91.09    4. Gastroesophageal reflux disease, esophagitis presence not specified K21.9    5. THOMAS (generalized anxiety disorder) F41.1        RECOMMENDATIONS:                                                          APPROVAL GIVEN to proceed with proposed procedure, without further diagnostic evaluation    Avoid scents/perfumes, petrochemicals, preservatives.       Signed Electronically by: Perla Garsia MD    Copy of this evaluation report is provided to requesting physician.    [place] Preop Guidelines

## 2017-09-14 NOTE — NURSING NOTE
"Chief Complaint   Patient presents with     Pre-Op Exam       Initial /84 (BP Location: Left arm, Patient Position: Chair, Cuff Size: Adult Regular)  Pulse 80  Temp 97.8  F (36.6  C) (Oral)  Resp 16  Ht 5' 4.5\" (1.638 m)  Wt 184 lb 8 oz (83.7 kg)  LMP 03/01/2008  SpO2 98%  Breastfeeding? No  BMI 31.18 kg/m2 Estimated body mass index is 31.18 kg/(m^2) as calculated from the following:    Height as of this encounter: 5' 4.5\" (1.638 m).    Weight as of this encounter: 184 lb 8 oz (83.7 kg).  Medication Reconciliation: complete     Graciela Rubio MA      "

## 2017-09-30 ENCOUNTER — NURSE TRIAGE (OUTPATIENT)
Dept: NURSING | Facility: CLINIC | Age: 62
End: 2017-09-30

## 2017-09-30 NOTE — TELEPHONE ENCOUNTER
"  Additional Information    Negative: Drug overdose and nurse unable to answer question    Negative: Caller requesting information not related to medicine    Negative: Caller requesting a prescription for Strep throat and has a positive culture result    Negative: Rash while taking a medication or within 3 days of stopping it    Negative: Immunization reaction suspected    Negative: [1] Asthma and [2] having symptoms of asthma (cough, wheezing, etc)    Negative: MORE THAN A DOUBLE DOSE of a prescription or over-the-counter (OTC) drug    Negative: [1] DOUBLE DOSE (an extra dose or lesser amount) of over-the-counter (OTC) drug AND [2] any symptoms (e.g., dizziness, nausea, pain, sleepiness)    Negative: [1] DOUBLE DOSE (an extra dose or lesser amount) of prescription drug AND [2] any symptoms (e.g., dizziness, nausea, pain, sleepiness)    Negative: Took another person's prescription drug    Negative: [1] DOUBLE DOSE (an extra dose or lesser amount) of prescription drug AND [2] NO symptoms (Exception: a double dose of antibiotics)    Negative: Diabetes drug error or overdose (e.g., insulin or extra dose)    Negative: [1] Request for URGENT new prescription or refill of \"essential\" medication (i.e., likelihood of harm to patient if not taken) AND [2] triager unable to fill per unit policy    Negative: [1] Prescription not at pharmacy AND [2] was prescribed today by PCP    Negative: Pharmacy calling with prescription questions and triager unable to answer question    Negative: Caller has URGENT medication question about med that PCP prescribed and triager unable to answer question    Negative: Caller has NON-URGENT medication question about med that PCP prescribed and triager unable to answer question    Negative: Caller requesting a NON-URGENT new prescription or refill and triager unable to refill per unit policy    Negative: Caller has medication question about med not prescribed by PCP and triager unable to answer " "question (e.g., compatibility with other med, storage)    Negative: [1] DOUBLE DOSE (an extra dose or lesser amount) of over-the-counter (OTC) drug AND [2] NO symptoms (all triage questions negative)    Negative: [1] DOUBLE DOSE (an extra dose or lesser amount) of antibiotic drug AND [2] NO symptoms (all triage questions negative)    Caller has medication question only, adult not sick, and triager answers question    Answer Assessment - Initial Assessment Questions  1. SYMPTOMS: \"Do you have any symptoms?\"      Dizziness, headache, mild ringing in her ears  2. SEVERITY: If symptoms are present, ask \"Are they mild, moderate or severe?\"      Mild, tolerable and improved with a nap in a dark room. She has cataract surgery and has 3 eye drops that she uses and went through them together and 2 of them can have side effect of dizziness and headache, she is able to manage at home, she will notify the eye doctor on Monday. She is done with the antibiotic drops on Monday and she will see if she can discontinue the other one or two then.  She was told to call back of any worsening of symptoms, or pain greater than 1 hour, vision changes, rash or breathing difficulty.    Protocols used: MEDICATION QUESTION CALL-ADULT-DARYN Seth RN, BSN  [facility]    "

## 2017-09-30 NOTE — TELEPHONE ENCOUNTER
----- Message from Patricia Mcdonough sent at 9/30/2017  1:30 PM CDT -----  Reason for call: Patient calling regarding reaction from eye drops. Intened for post-cataract surgery. Please advise.     Phone number to reach patient:  Home number on file [phone] (home)    Best Time:  Anytime.    Can we leave a detailed message on this number?  YES

## 2017-10-03 ENCOUNTER — MYC MEDICAL ADVICE (OUTPATIENT)
Dept: FAMILY MEDICINE | Facility: CLINIC | Age: 62
End: 2017-10-03

## 2017-10-28 ENCOUNTER — HEALTH MAINTENANCE LETTER (OUTPATIENT)
Age: 62
End: 2017-10-28

## 2017-12-13 ENCOUNTER — OFFICE VISIT (OUTPATIENT)
Dept: OBGYN | Facility: CLINIC | Age: 62
End: 2017-12-13
Payer: COMMERCIAL

## 2017-12-13 VITALS
HEIGHT: 65 IN | DIASTOLIC BLOOD PRESSURE: 70 MMHG | SYSTOLIC BLOOD PRESSURE: 120 MMHG | HEART RATE: 80 BPM | WEIGHT: 188 LBS | BODY MASS INDEX: 31.32 KG/M2

## 2017-12-13 DIAGNOSIS — Z12.4 SCREENING FOR MALIGNANT NEOPLASM OF CERVIX: ICD-10-CM

## 2017-12-13 DIAGNOSIS — Z01.419 ENCOUNTER FOR GYNECOLOGICAL EXAMINATION WITHOUT ABNORMAL FINDING: Primary | ICD-10-CM

## 2017-12-13 PROCEDURE — G0145 SCR C/V CYTO,THINLAYER,RESCR: HCPCS | Performed by: OBSTETRICS & GYNECOLOGY

## 2017-12-13 PROCEDURE — 99396 PREV VISIT EST AGE 40-64: CPT | Performed by: OBSTETRICS & GYNECOLOGY

## 2017-12-13 PROCEDURE — G0476 HPV COMBO ASSAY CA SCREEN: HCPCS | Performed by: OBSTETRICS & GYNECOLOGY

## 2017-12-13 NOTE — NURSING NOTE
"Chief Complaint   Patient presents with     Physical       Initial /70  Pulse 80  Ht 5' 4.5\" (1.638 m)  Wt 188 lb (85.3 kg)  LMP 2008  Breastfeeding? No  BMI 31.77 kg/m2 Estimated body mass index is 31.77 kg/(m^2) as calculated from the following:    Height as of this encounter: 5' 4.5\" (1.638 m).    Weight as of this encounter: 188 lb (85.3 kg).  BP completed using cuff size: large        The following HM Due: pap smear      The following patient reported/Care Every where data was sent to:  P ABSTRACT QUALITY INITIATIVES [65112]  none      n/a              "

## 2017-12-13 NOTE — MR AVS SNAPSHOT
After Visit Summary   12/13/2017    Nenita Goins    MRN: [id number]           Patient Information     Date Of Birth          1955        Visit Information        Provider Department      12/13/2017 10:00 AM Kathy Tovar MD [facility]        Today's Diagnoses     Encounter for gynecological examination without abnormal finding    -  1    Screening for malignant neoplasm of cervix           Follow-ups after your visit        Your next 10 appointments already scheduled     Dec 21, 2017  8:20 AM CST   SHORT with Perla Garsia MD   [facility] ([facility])    [address] [place] [id number] [phone]              Who to contact     If you have questions or need follow up information about today's clinic visit or your schedule please contact [facility] directly at [phone].  Normal or non-critical lab and imaging results will be communicated to you by MyChart, letter or phone within 4 business days after the clinic has received the results. If you do not hear from us within 7 days, please contact the clinic through MyChart or phone. If you have a critical or abnormal lab result, we will notify you by phone as soon as possible.  Submit refill requests through [employer] or call your pharmacy and they will forward the refill request to us. Please allow 3 business days for your refill to be completed.          Additional Information About Your Visit        MyChart Information     [employer] gives you secure access to your electronic health record. If you see a primary care provider, you can also send messages to your care team and make appointments. If you have questions, please call your primary care clinic.  If you do not have a primary care provider, please call [phone] and they will assist you.        Care EveryWhere ID     This is your Care EveryWhere ID. This could be used by other organizations to access your  "[place] medical records  [id number]        Your Vitals Were     Pulse Height Last Period Breastfeeding? BMI (Body Mass Index)       80 5' 4.5\" (1.638 m) 03/01/2008 No 31.77 kg/m2        Blood Pressure from Last 3 Encounters:   12/13/17 120/70   09/14/17 119/84   07/20/17 123/75    Weight from Last 3 Encounters:   12/13/17 188 lb (85.3 kg)   09/14/17 184 lb 8 oz (83.7 kg)   08/03/17 186 lb (84.4 kg)              We Performed the Following     HPV High Risk Types DNA Cervical     Pap imaged thin layer screen with HPV - recommended age 30 - 65 years (select HPV order below)        Primary Care Provider Office Phone # Fax #    Perla Garsia [phone] [address]  [facility] [address]        Equal Access to Services     LORNA [facility] : Hadii aad janette hadasho Soomaali, waaxda luqadaha, qaybta kaalmada adeegyada, waxay yajairain hayalia casen . So [facility] [phone].    ATENCIÓN: Si habla español, tiene a rogers disposición servicios gratuitos de asistencia lingüística. Yoselyn al [phone].    We comply with applicable federal civil rights laws and [place] laws. We do not discriminate on the basis of race, color, national origin, age, disability, sex, sexual orientation, or gender identity.            Thank you!     Thank you for choosing [facility]  for your care. Our goal is always to provide you with excellent care. Hearing back from our patients is one way we can continue to improve our services. Please take a few minutes to complete the written survey that you may receive in the mail after your visit with us. Thank you!             Your Updated Medication List - Protect others around you: Learn how to safely use, store and throw away your medicines at www.disposemymeds.org.          This list is accurate as of: 12/13/17 10:33 AM.  Always use your most recent med list.                   Brand Name Dispense Instructions for use Diagnosis    calcium & magnesium " carbonates 311-232 MG Tabs           COENZYME Q-10 PO      Take  by mouth.

## 2017-12-13 NOTE — PROGRESS NOTES
"Nenita Goins is a 62 year old year old woman who presents for pap smear/GYN exam.  Known fibroids, 18 week size uterus in the past, last ultrasound 2011.  She denies abnormal bleeding, abnormal vaginal discharge, pelvic pain, GI symptoms.  Mammogram 1/2016 showed breasts almost entirely fat, she plans q 2 year.  Known vaginovulvar atrophy, we have used the narrow adolescent speculum with good results.     Past medical, surgical, family, and social history have been noted and updated in the [facility] data base.      ROS:  See above re /GI .  Denies fevers, skin lesions.     OBJECTIVE: Healthy female in NAD.  /70  Pulse 80  Ht 5' 4.5\" (1.638 m)  Wt 188 lb (85.3 kg)  LMP 03/01/2008  Breastfeeding? No  BMI 31.77 kg/m2     Abdomen soft, non-tender, without mass.      Pelvic exam:  External genitalia appeared normal without lesion.  Urethral meatus, perineum, and anus appeared normal. Cervix and vagina appeared normal, without lesion.  Pap was sent.  Bimanual exam showed an irregular enlarged uterus, about 14 week size.      ASSESSMENT:  Normal GYN exam.  Know fibroids, asymptomatic.     PLAN: if pap is normal repeat per guidelines.    "

## 2017-12-15 LAB
COPATH REPORT: NORMAL
PAP: NORMAL

## 2017-12-19 LAB
FINAL DIAGNOSIS: NORMAL
HPV HR 12 DNA CVX QL NAA+PROBE: NEGATIVE
HPV16 DNA SPEC QL NAA+PROBE: NEGATIVE
HPV18 DNA SPEC QL NAA+PROBE: NEGATIVE
SPECIMEN DESCRIPTION: NORMAL

## 2018-01-06 ENCOUNTER — MYC MEDICAL ADVICE (OUTPATIENT)
Dept: FAMILY MEDICINE | Facility: CLINIC | Age: 63
End: 2018-01-06

## 2018-01-08 ENCOUNTER — HOSPITAL ENCOUNTER (EMERGENCY)
Facility: CLINIC | Age: 63
Discharge: HOME | End: 2018-01-08
Attending: EMERGENCY MEDICINE | Admitting: EMERGENCY MEDICINE
Payer: COMMERCIAL

## 2018-01-08 VITALS
DIASTOLIC BLOOD PRESSURE: 81 MMHG | SYSTOLIC BLOOD PRESSURE: 163 MMHG | OXYGEN SATURATION: 97 % | BODY MASS INDEX: 31.59 KG/M2 | TEMPERATURE: 98.1 F | HEART RATE: 77 BPM | RESPIRATION RATE: 16 BRPM | WEIGHT: 186.9 LBS

## 2018-01-08 DIAGNOSIS — R03.0 ELEVATED BLOOD PRESSURE READING WITHOUT DIAGNOSIS OF HYPERTENSION: ICD-10-CM

## 2018-01-08 PROCEDURE — 99284 EMERGENCY DEPT VISIT MOD MDM: CPT | Mod: Z6 | Performed by: EMERGENCY MEDICINE

## 2018-01-08 PROCEDURE — 99283 EMERGENCY DEPT VISIT LOW MDM: CPT | Performed by: EMERGENCY MEDICINE

## 2018-01-08 ASSESSMENT — ENCOUNTER SYMPTOMS
NERVOUS/ANXIOUS: 1
SHORTNESS OF BREATH: 0
LIGHT-HEADEDNESS: 1
HEADACHES: 0
TROUBLE SWALLOWING: 0
FEVER: 1
WEAKNESS: 1

## 2018-01-08 NOTE — TELEPHONE ENCOUNTER
Dr. Garsia-Please review.  Writer planned on recommendin. If dizzy and BP still elevated, go to ER  2. Otherwise, do schedule appointment with provider with [facility] so BP can be further evaluated    Thank you!  EDUIN CardonaN, RN

## 2018-01-08 NOTE — ED AVS SNAPSHOT
[facility], Emergency Department    [address]    [place] [address]    Phone:  [phone]    Fax:  [phone]                                       Nenita Goins   MRN: [id number]    Department:  [facility], Emergency Department   Date of Visit:  1/8/2018           Patient Information     Date Of Birth          1955        Your diagnoses for this visit were:     Elevated blood pressure reading without diagnosis of hypertension        You were seen by Johanny Feliciano MD.        Discharge Instructions       You have been seen in the emergency department for an elevated blood pressure reading.  We recommend that you check your blood pressure on a daily basis for the next 1-2 weeks.  Write down the blood pressure readings as well as the time of day they were taken.  Bring these readings to your primary clinic and discuss this with them.  Generally we do not advise starting blood pressure medications unless there is a consistent pattern of having elevated blood pressure readings over several days.    You can do things to try to reduce your need to use blood pressure medications.  We recommend cutting back on caffeine, eating less salt, exercising, and try to reduce stress.    If you notice chest pain, shortness of breath, weakness or numbness, difficulty speaking or swallowing, you should come back to the emergency department immediately.    Discharge References/Attachments     HYPERTENSION, TO BE CONFIRMED (ENGLISH)    LOWERING YOUR BLOOD PRESSURE: METABOLIC SYNDROME (ENGLISH)      24 Hour Appointment Hotline       To make an appointment at any [facility] call [phone] ([phone]). If you don't have a family doctor or clinic, we will help you find one. [facility] are conveniently located to serve the needs of you and your family.             Review of your medicines      Our records show that you are taking the medicines listed below. If these are incorrect,  please call your family doctor or clinic.        Dose / Directions Last dose taken    calcium & magnesium carbonates 311-232 MG Tabs   Indication:  take it rarely        Refills:  0        COENZYME Q-10 PO        Take  by mouth.   Refills:  0                Orders Needing Specimen Collection     None      Pending Results     No orders found from 1/6/2018 to 1/9/2018.            Pending Culture Results     No orders found from 1/6/2018 to 1/9/2018.            Pending Results Instructions     If you had any lab results that were not finalized at the time of your Discharge, you can call the ED Lab Result RN at [phone]. You will be contacted by this team for any positive Lab results or changes in treatment. The nurses are available 7 days a week from 10A to 6:30P.  You can leave a message 24 hours per day and they will return your call.        Thank you for choosing [facility]       Thank you for choosing [facility] for your care. Our goal is always to provide you with excellent care. Hearing back from our patients is one way we can continue to improve our services. Please take a few minutes to complete the written survey that you may receive in the mail after you visit with us. Thank you!        [employer] Information     [employer] gives you secure access to your electronic health record. If you see a primary care provider, you can also send messages to your care team and make appointments. If you have questions, please call your primary care clinic.  If you do not have a primary care provider, please call [phone] and they will assist you.        Care EveryWhere ID     This is your Care EveryWhere ID. This could be used by other organizations to access your [facility] medical records  [id number]        Equal Access to Services     [facility] NEIL : Maritza Perry, wathee curry, qaybta kaalshirin garcia, maureen morgan. [facility] [phone].    ATENCIÓN: Elysia clements,  tiene a rogers disposición servicios gratuitos de asistencia lingüística. Llannabelle al [phone].    We comply with applicable federal civil rights laws and [place] laws. We do not discriminate on the basis of race, color, national origin, age, disability, sex, sexual orientation, or gender identity.            After Visit Summary       This is your record. Keep this with you and show to your community pharmacist(s) and doctor(s) at your next visit.

## 2018-01-08 NOTE — ED AVS SNAPSHOT
[facility], [place], Emergency Department    [address]    [facility] [address]    Phone:  [phone]    Fax:  [phone]                                       Nenita Goins   MRN: [id number]    Department:  [facility], Emergency Department   Date of Visit:  1/8/2018           After Visit Summary Signature Page     I have received my discharge instructions, and my questions have been answered. I have discussed any challenges I see with this plan with the nurse or doctor.    ..........................................................................................................................................  Patient/Patient Representative Signature      ..........................................................................................................................................  Patient Representative Print Name and Relationship to Patient    ..................................................               ................................................  Date                                            Time    ..........................................................................................................................................  Reviewed by Signature/Title    ...................................................              ..............................................  Date                                                            Time

## 2018-01-09 NOTE — ED PROVIDER NOTES
[facility] [place], [place] EMERGENCY DEPARTMENT ([facility])    1/08/18     ED 18 6:28 PM   History     Chief Complaint   Patient presents with     Dizziness     dizziness started friday. Concerned for hypertension at-home reading of 157/115.     The history is provided by the patient, medical records and a friend.     Nenita Goins is a 62 year old female who presents with concerns of elevated blood pressures.  She has a history of anxiety, multiple chemical sensitivity, mild intermittent asthma.  Patient states that lately she has been under a fair amount of stress.  Her health insurance has changed several times over the past year, and will have more changes in the future.  This results in a change of providers for her.  She notes that she has been fairly busy and running errands.  Around 3 days ago she was vacuuming her home, leaned over and felt dizzy. She pulled the emergency cord (patient lives in a senior cooperative) and staff checked on her and took her blood pressure found it 160/90. Her blood pressure later that day was 150/90.  She notes that she had a cold to the point where she had to rest in bed, and she had some diarrhea as well.  She gradually improved from this today she had things that she needed to get done and so had her blood pressure rechecked before she started errands, found it 157 systolic and this concerned her so she was brought in with a friend for further evaluation.  She has had low grade fevers, no documented fevers.  Patient notes that she has been to ER 3 times in past 10 years for heart palpitations and other symptoms that turned out to be anxiety attacks and was reluctant to return to the ER today in case this was again a symptom related to anxiety.  She notes increased issues with asthma.  She states that she has not needed medications for asthma for 20 years.  She states there is not much that works on her asthma and was on steroid inhalers as a child, then states that she can t  have these because of the propellant in the aerosol inhaler.  Patient states she is very sensitive to any kind of fragrance or chemical.. Something is making her eyes burn in this exam room and she requests fragrance free linens.     Denies chest pain, shortness of breath, unilateral weakness, difficulty swallowing or speaking, vision changes.     I have reviewed the Medications, Allergies, Past Medical and Surgical History, and Social History in the Epic system.    Review of Systems   Constitutional: Positive for fever (low grade).   HENT: Negative for trouble swallowing.    Eyes: Negative for visual disturbance.   Respiratory: Negative for shortness of breath.    Cardiovascular: Negative for chest pain.   Skin: Positive for rash.   Neurological: Positive for weakness and light-headedness. Negative for headaches.   Psychiatric/Behavioral: The patient is nervous/anxious.    All other systems reviewed and are negative.      Physical Exam   BP: 172/76  Pulse: 77  Temp: 98.1  F (36.7  C)  Resp: 16  Weight: 84.8 kg (186 lb 14.4 oz)  SpO2: 97 %      Physical Exam   Constitutional: She is oriented to person, place, and time. No distress.   Pleasant, alert, cooperative, NAD   HENT:   Head: Atraumatic.   Mouth/Throat: Oropharynx is clear and moist. No oropharyngeal exudate.   Eyes: Pupils are equal, round, and reactive to light. No scleral icterus.   Cardiovascular: Normal rate, regular rhythm, normal heart sounds and intact distal pulses.    No murmur heard.  Pulmonary/Chest: Effort normal and breath sounds normal. No respiratory distress. She has no wheezes. She has no rales.   Abdominal: Soft. Bowel sounds are normal. There is no tenderness.   Musculoskeletal: She exhibits no edema or tenderness.   Neurological: She is alert and oriented to person, place, and time. She has normal strength. No cranial nerve deficit or sensory deficit. Coordination normal. GCS eye subscore is 4. GCS verbal subscore is 5. GCS motor subscore  is 6.   Skin: Skin is warm. No rash noted. She is not diaphoretic.   Nursing note and vitals reviewed.      ED Course     ED Course     Procedures  No results found for this or any previous visit (from the past 24 hour(s)).        Assessments & Plan (with Medical Decision Making)   This is a 62-year-old female who presents to the emergency department today for high blood pressure.  Patient does not have a history of hypertension, rather she has noted some elevated blood pressure readings in the past couple of days.  The patient has been very stressed lately with regard to insurance issues, it appears that at the beginning of here the patient had her [facility] [place] from [place] to [facility] and they switched her to [employer] which does change her providers.  Evidently it will switch again in February but for January she has [employer].  She was advised to come to the emergency department because of elevated blood pressure readings.  Of note she has no headache, no vision changes, no chest pain, no shortness of breath, no unilateral neurologic symptoms.    Initial blood pressure in the ED is 172/76, BP is 163/81.  The patient is in no acute distress, does seem very anxious particularly about her insurance issues.    I had a discussion with her where I explained that generally we do not treat asymptomatic hypertension in the Emergency Department.  I suspect that she is under some additional stress right now and this is what is causing her to have some elevated blood pressure readings.  I did tell her that the best thing for her to do would be to check her blood pressure on a regular (daily) basis for the next 1-2 weeks and bring those blood pressure readings into her primary clinic.  The patient was advised about some of the downsides of starting antihypertensive treatment at this time, I do have some concerns that we would overshoot and potentially cause issues with orthostasis and falls.  Patient  is equally concerned about this. I would rather that she checked her blood pressure readings on a regular basis and bring those readings to her clinic.  She apparently has a clinic appointment made with a [facility] doctor for tomorrow.  Patient states that she will attend this appointment and discuss this issue with him.  She was also advised that if she notices symptomatic elevated blood pressure such as headache, vision changes, chest pain, shortness of breath, or unilateral neurologic symptoms she should come back to the Emergency Department immediately.  However given the fact that she reports her blood pressures are normally 120 systolic I am very hesitant to rush to treat her with antihypertensives for elevated blood pressure readings for 2 days.  Patient is in agreement with this plan of care.    This part of the document was transcribed by Lucero Santacruz Medical Scribe.      I have reviewed the nursing notes.    I have reviewed the findings, diagnosis, plan and need for follow up with the patient.    Discharge Medication List as of 1/8/2018  7:03 PM          Final diagnoses:   Elevated blood pressure reading without diagnosis of hypertension       1/8/2018   [facility], [place], EMERGENCY DEPARTMENT     Johanny Feliciano MD  01/08/18 [id number]

## 2018-01-09 NOTE — DISCHARGE INSTRUCTIONS
You have been seen in the emergency department for an elevated blood pressure reading.  We recommend that you check your blood pressure on a daily basis for the next 1-2 weeks.  Write down the blood pressure readings as well as the time of day they were taken.  Bring these readings to your primary clinic and discuss this with them.  Generally we do not advise starting blood pressure medications unless there is a consistent pattern of having elevated blood pressure readings over several days.    You can do things to try to reduce your need to use blood pressure medications.  We recommend cutting back on caffeine, eating less salt, exercising, and try to reduce stress.    If you notice chest pain, shortness of breath, weakness or numbness, difficulty speaking or swallowing, you should come back to the emergency department immediately.

## 2018-02-04 NOTE — PROGRESS NOTES
SUBJECTIVE:  Nenita Goins, a 62 year old female, is here to discuss the following issues:     ELEVATED BP   Had episode of dizziness associated elevated blood pressure at the beginning of the year.  She had been sick with URI's during the holidays and had other personal stressors at the time.  She did go to ED where her exam was normal other than elevated BP (172/76 then 163/81).  She was advised to follow-up with PCP but she was having issues with insurance which would not cover me for the month of January.      She did go to [facility] for ER follow-up and had labs - CBC, CMP, UA, A1c.  Everything was fine except for borderline ALT (45).  BP was within normal limits (129/78).  No further episodes of dizziness noted.    LEFT HIP and KNEE PAIN  Outer left hip pain and left knee pain were bothering her a few weeks ago but now seem to have resolved.  I have thought that her hip pain likely represented trochanteric bursitis.    Has cut down on sugar in her diet and feels that has helped.  Doing yoga stretches.    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    She does have a history of multiple chemical sensitivities.  Is considering moving but this is stressful as she has a lot of friends in the building.        Problem list and histories reviewed & updated, as indicated.  Patient Active Problem List   Diagnosis     Allergic rhinitis     Uterine fibroid     Overweight (BMI 25.0-29.9)     Dyslipidemia (high LDL; low HDL)     Elevated triglycerides with high cholesterol     GERD (gastroesophageal reflux disease)     Lipoma of skin and subcutaneous tissue     Difficulty hearing     Seborrheic keratoses     THOMAS (generalized anxiety disorder)     Advance care planning     Chemical sensitivity       [facility] reviewed in CareEverywhere    ROS:  RESP: NEGATIVE for shortness of breath  CV: NEGATIVE for chest pain    OBJECTIVE:    /82  "(Cuff Size: Adult Large)  Pulse 78  Temp 97.6  F (36.4  C) (Oral)  Resp 14  Ht 5' 4.5\" (1.638 m)  Wt 186 lb 8 oz (84.6 kg)  LMP 03/01/2008  SpO2 98%  BMI 31.52 kg/m2  GEN:  no apparent distress  EYES: PERRL, conjunctivae and sclerae clear   LUNGS:  normal respiratory effort, and lungs clear to auscultation bilaterally - no rales, rhonchi or wheezes  CV: regular rate and rhythm, normal S1 S2, no S3 or S4 and no murmur, click or rub   SKIN:  normal to inspection and palpation, no rashes  PSYCH:    Appearance: appropriately and casually dressed and tearful at times    Eye Contact: good  Attitude: cooperative    Speech:  normal rate, rhythm, and tone    Thought Form: organized and goal oriented    Thought Content: no evidence of psychotic thought    Mood: anxious    Affect: appropriate and in normal range    Insight: good     EKG: appears normal, NSR, normal axis, normal intervals, no acute ST/T changes c/w ischemia, no LVH by voltage criteria, unchanged from previous tracings by my interpretation     ASSESSMENT/PLAN:  1. Elevated blood pressure reading without diagnosis of hypertension  Discussed that she has pre-hypertension which will likely progress to hypertension.  Discussed relevant anatomy, pathophysiology, and etiology of this condition.  Discussed the importance of controlling blood pressure to prevent chronic damage to arteries and subsequent arterial disease including risk of heart attack and stroke.  At this point stressful situations are triggering high blood pressure readings.  She'll work on maintaining healthy diet, increasing exercise, and trying to lose weight.  She will monitor blood pressure and let me know if it remains high.    - EKG 12-lead complete w/read - Clinics    2. THOMAS (generalized anxiety disorder)  Discussed relaxation techniques.  She is doing adams chi.  Could consider more yoga, mindfulness.  Also consider CBT with Lady Leggett, [facility].      3. " Chemical sensitivity  With recent concerns regarding air quality at her co-op apartment.  Unfortunately the whole building is on a central HVAC system so she has little control over that.           Patient Instructions   Call Mammogram and Colonoscopy scheduling number: [phone] to schedule mammogram.    Work on healthy diet, increasing exercise, and weight loss if possible.        Perla Garsia MD   [facility]

## 2018-02-05 ENCOUNTER — OFFICE VISIT (OUTPATIENT)
Dept: FAMILY MEDICINE | Facility: CLINIC | Age: 63
End: 2018-02-05
Payer: COMMERCIAL

## 2018-02-05 VITALS
BODY MASS INDEX: 31.07 KG/M2 | RESPIRATION RATE: 14 BRPM | DIASTOLIC BLOOD PRESSURE: 82 MMHG | SYSTOLIC BLOOD PRESSURE: 124 MMHG | TEMPERATURE: 97.6 F | HEIGHT: 65 IN | OXYGEN SATURATION: 98 % | HEART RATE: 78 BPM | WEIGHT: 186.5 LBS

## 2018-02-05 DIAGNOSIS — R03.0 ELEVATED BLOOD PRESSURE READING WITHOUT DIAGNOSIS OF HYPERTENSION: Primary | ICD-10-CM

## 2018-02-05 DIAGNOSIS — Z91.09 CHEMICAL SENSITIVITY: ICD-10-CM

## 2018-02-05 DIAGNOSIS — F41.1 GAD (GENERALIZED ANXIETY DISORDER): ICD-10-CM

## 2018-02-05 PROCEDURE — 93000 ELECTROCARDIOGRAM COMPLETE: CPT | Performed by: FAMILY MEDICINE

## 2018-02-05 PROCEDURE — 99214 OFFICE O/P EST MOD 30 MIN: CPT | Performed by: FAMILY MEDICINE

## 2018-02-05 NOTE — MR AVS SNAPSHOT
After Visit Summary   2/5/2018    Nenita Goins    MRN: [id number]           Patient Information     Date Of Birth          1955        Visit Information        Provider Department      2/5/2018 9:00 AM Perla Garsia MD [facility]        Today's Diagnoses     Elevated blood pressure reading without diagnosis of hypertension    -  1      Care Instructions    Call Mammogram and Colonoscopy scheduling number: [phone] to schedule mammogram.    Work on healthy diet, increasing exercise, and weight loss if possible.            Follow-ups after your visit        Who to contact     If you have questions or need follow up information about today's clinic visit or your schedule please contact [facility] directly at [phone].  Normal or non-critical lab and imaging results will be communicated to you by [employer], letter or phone within 4 business days after the clinic has received the results. If you do not hear from us within 7 days, please contact the clinic through [employer] or phone. If you have a critical or abnormal lab result, we will notify you by phone as soon as possible.  Submit refill requests through [employer] or call your pharmacy and they will forward the refill request to us. Please allow 3 business days for your refill to be completed.          Additional Information About Your Visit        [employer] Information     [employer] gives you secure access to your electronic health record. If you see a primary care provider, you can also send messages to your care team and make appointments. If you have questions, please call your primary care clinic.  If you do not have a primary care provider, please call [phone] and they will assist you.        Care EveryWhere ID     This is your Care EveryWhere ID. This could be used by other organizations to access your [place] medical records  [id number]        Your Vitals Were     Pulse Temperature Respirations  "Height Last Period Pulse Oximetry    78 97.6  F (36.4  C) (Oral) 14 5' 4.5\" (1.638 m) 03/01/2008 98%    BMI (Body Mass Index)                   31.52 kg/m2            Blood Pressure from Last 3 Encounters:   02/05/18 124/82   01/08/18 163/81   12/13/17 120/70    Weight from Last 3 Encounters:   02/05/18 186 lb 8 oz (84.6 kg)   01/08/18 186 lb 14.4 oz (84.8 kg)   12/13/17 188 lb (85.3 kg)              We Performed the Following     EKG 12-lead complete w/read - Clinics        Primary Care Provider Office Phone # Fax #    Perla Garsia [phone] [address]  [facility] [address]        Equal Access to Services     [facility] : Hadii aad ku hadasho Soflaquita, waaxda luqadaha, qaybta kaalmada jose, maureen puga . So [facility] [phone].    ATENCIÓN: Si habla español, tiene a rogers disposición servicios gratuitos de asistencia lingüística. Yoselyn al [phone].    We comply with applicable federal civil rights laws and [place] laws. We do not discriminate on the basis of race, color, national origin, age, disability, sex, sexual orientation, or gender identity.            Thank you!     Thank you for choosing [facility]  for your care. Our goal is always to provide you with excellent care. Hearing back from our patients is one way we can continue to improve our services. Please take a few minutes to complete the written survey that you may receive in the mail after your visit with us. Thank you!             Your Updated Medication List - Protect others around you: Learn how to safely use, store and throw away your medicines at www.disposemymeds.org.          This list is accurate as of 2/5/18  9:37 AM.  Always use your most recent med list.                   Brand Name Dispense Instructions for use Diagnosis    calcium & magnesium carbonates 311-232 MG Tabs           COENZYME Q-10 PO      Take  by mouth.          "

## 2018-02-05 NOTE — NURSING NOTE
"Chief Complaint   Patient presents with     Hypertension     Musculoskeletal Problem       Initial /82 (Cuff Size: Adult Large)  Pulse 78  Temp 97.6  F (36.4  C) (Oral)  Resp 14  Ht 5' 4.5\" (1.638 m)  Wt 186 lb 8 oz (84.6 kg)  LMP 03/01/2008  SpO2 98%  BMI 31.52 kg/m2 Estimated body mass index is 31.52 kg/(m^2) as calculated from the following:    Height as of this encounter: 5' 4.5\" (1.638 m).    Weight as of this encounter: 186 lb 8 oz (84.6 kg).  Medication Reconciliation: complete     Avelina Duncan, SELENA      "

## 2018-02-05 NOTE — PATIENT INSTRUCTIONS
Call Mammogram and Colonoscopy scheduling number: [phone] to schedule mammogram.    Work on healthy diet, increasing exercise, and weight loss if possible.

## 2018-02-08 ENCOUNTER — TRANSFERRED RECORDS (OUTPATIENT)
Dept: HEALTH INFORMATION MANAGEMENT | Facility: CLINIC | Age: 63
End: 2018-02-08

## 2018-02-13 ENCOUNTER — TELEPHONE (OUTPATIENT)
Dept: FAMILY MEDICINE | Facility: CLINIC | Age: 63
End: 2018-02-13

## 2018-02-13 NOTE — TELEPHONE ENCOUNTER
Writer called patient, explained recommendation per covering provider to discuss this medication with PCP at tomorrow's appt.    Patient verbalized understanding and in agreement with plan.  EDUIN CardonaN, RN

## 2018-02-13 NOTE — TELEPHONE ENCOUNTER
Reason for Call:  Medication or medication refill: ibuprofen    Do you use a [facility]?  Name of the pharmacy and phone number for the current request:  [facility] [place] - [phone]    Name of the medication requested: ibuprofen    Other request: Wally is requesting a pain killer for her back pain preferably before the appointment for tomorrow. There is a version of ibuprofen [facility] [place] [facility] that is just ibuprofen no added ingredients. Any questions please call wally back asap.    Can we leave a detailed message on this number? YES    Phone number patient can be reached at: Cell number on file:    Telephone Information:   Mobile [phone]       Best Time: anytime    Call taken on 2/13/2018 at 8:39 AM by Maxwell Vargas

## 2018-02-13 NOTE — PROGRESS NOTES
SUBJECTIVE:  Nenita Goins, a 62 year old female, is here with partner Susu to discuss the following issues:     LOW BACK PAIN  Sudden onset 2 days ago after rolling over in bed  Bilateral low back pain  Some radiation to front of abdomen.  Some radiation into buttocks  Some tingling in left leg once - now resolved.    Walking helps.  Is using an OTC ibuprofen 200 mg Q 4 hours which helps but she has concerns about the additives in that given her history of multiple chemical sensitivities.  Interestingly she had previously reported allergy to ibuprofen but has not had any trouble breathing with it currently.  She would like to have some ibuprofen compounded in glycerin to avoid the additives and has been in touch with [facility] which can make that for her with a prescription.    PMH: No history of low back pain      Problem list and histories reviewed & updated, as indicated.  Patient Active Problem List   Diagnosis     Allergic rhinitis     Uterine fibroid     Overweight (BMI 25.0-29.9)     Dyslipidemia (high LDL; low HDL)     Elevated triglycerides with high cholesterol     GERD (gastroesophageal reflux disease)     Lipoma of skin and subcutaneous tissue     Difficulty hearing     Seborrheic keratoses     THOMAS (generalized anxiety disorder)     Advance care planning     Chemical sensitivity       BP Readings from Last 3 Encounters:   02/14/18 117/73   02/05/18 124/82   01/08/18 163/81    Wt Readings from Last 3 Encounters:   02/14/18 184 lb 4 oz (83.6 kg)   02/05/18 186 lb 8 oz (84.6 kg)   01/08/18 186 lb 14.4 oz (84.8 kg)         ROS:  GI/: No recent changes in bowel or bladder habits.      OBJECTIVE:    /73 (BP Location: Left arm, Patient Position: Sitting, Cuff Size: Adult Regular)  Pulse 68  Temp 97.9  F (36.6  C) (Oral)  Resp 17  Wt 184 lb 4 oz (83.6 kg)  LMP 03/01/2008  SpO2 100%  BMI 31.14 kg/m2  GEN:  no apparent distress  BACK:  No focal tenderness to palpation over  the spinous processes, paraspinal muscles and SI joints.  There is decreased lumbar lordosis.  NEURO:  LE DTR's are 2+ and symmetric.  Great toe dorsiflexion strength intact.  Negative SLR.       ASSESSMENT/PLAN:    ICD-10-CM    1. Acute bilateral low back pain without sciatica M54.5 [facility] HAND, AND CHIROPRACTIC REFERRAL     COMPOUNDED NON-CONTROLLED SUBSTANCE (CMPD RX) - PHARMACY TO MIX COMPOUNDED MEDICATION   2. Chemical sensitivity Z91.09      I did write for custom, compounded ibuprofen as she is tolerating the ibuprofen and finds it helpful.  Reviewed expected course of gradual improvement and red flag symptoms including sudden changes in bowel or bladder habits or lower extremity weakness. Reviewed and gave patient ed material detailing home cares including back comfort positions.  Discussed importance of strengthening core to prevent future exacerbations.  Consider Physical Therapy, especially if not improving.  Patient instructed to return to clinic if symptoms persist, worsen, or do not resolve as anticipated.       Patient Instructions       When You Have Low Back Pain    Caring for Your Back  You are not alone.    Low back pain is very common. Nearly half of all adults have low back pain in any given year. The good news is that back pain is rarely a danger to your health. Most people can manage their back pain on their own and about half of them start feeling better within 2 weeks. In 9 out of 10 cases, low back pain goes away or no longer limits daily activity within 6 weeks.     Your outlook is good!    Your symptoms tell us that your low back pain is most likely not a danger to you. Most of the time we do not know the exact cause of low back pain, even if you see a doctor or have an MRI. However, treatment can still work without knowing the cause of the pain. Less than 1 in 100 people need surgery for their back pain.     What can I do about my low back pain?     There are three things you can do to  ease low back pain and help it go away.    Use heat or cold packs.    Take medicine as directed.    Use positions, movements and exercises.     Using heat or cold packs    Try cold packs or gentle heat to ease your pain. Use whichever gives you the most relief. Apply the cold pack or heat for 15 minutes at a time, as often as needed.    Taking medicine      If your doctor has prescribed medicine, be sure to follow the directions.    If you take over-the-counter medicine, read and follow the directions.    Talk to your doctor if you have any questions.     Using positions, movements and exercises    Research tells us that moving your joints and muscles can help you recover from back pain. Such activity should be simple and gentle. Use the positions in the photos as well as walking to help relieve your pain. Try taking a short walk every 3 to 4 hours during the day. Walk for a few minutes inside your home or take longer walks outside, on a treadmill or at a mall. Slowly increase the amount of time you walk. Expect discomfort when you begin, but it should lessen as your back starts to heal. When your back feels better, walk daily to keep your back and body healthy.    Finding a comfortable position    When your back pain is new, certain positions will ease your pain. Gently try each of the positions below until you find one that is helpful. Once you find a position of comfort, use it as often as you like when you are resting. You will recover faster if you combine rest with activity.         Lie on your back with your legs bent. You can do this by placing a pillow under your knees. Or you may lie on the floor and rest your lower legs on the seat of a chair.       Lie on your side with your knees bent, and place a pillow between your knees.       Lie on your stomach over pillows.      When should I call my doctor?    Your back pain should improve over the first couple of weeks. As it improves, you should be able to  return to your normal activities. But call your doctor if:    You have a sudden change in your ability to control your bladder or bowels.    You feel tingling in your groin or legs.    The pain spreads down your leg and into your foot.    Your toes, feet or leg muscles feel weak.    You feel generally unwell or sick.    Your pain does not get better or gets worse.      If you are deaf or hard of hearing, please let us know. We provide many free services including sign language interpreters,oral interpreters, TTYs, telephone amplifiers, note takers and written materials.    For informational purposes only. Not to replace the advice of your health care provider. Copyright   2013 [facility]. All rights reserved. [employer] 143827 - Rev 06/14.       Peral Garsia MD   [facility]

## 2018-02-13 NOTE — TELEPHONE ENCOUNTER
Defer it to PCP whom she is seeing tomorrow as it seems like she is asking for compounding medication.

## 2018-02-13 NOTE — TELEPHONE ENCOUNTER
Pt has appt with pcp on 2/14/18 for back pain.  This is a new medication for pt.  I don't see anything on hx med list or current med list.    Routing to DOD/pcp.  DONNA Holden

## 2018-02-14 ENCOUNTER — OFFICE VISIT (OUTPATIENT)
Dept: FAMILY MEDICINE | Facility: CLINIC | Age: 63
End: 2018-02-14
Payer: COMMERCIAL

## 2018-02-14 VITALS
SYSTOLIC BLOOD PRESSURE: 117 MMHG | BODY MASS INDEX: 31.14 KG/M2 | RESPIRATION RATE: 17 BRPM | WEIGHT: 184.25 LBS | DIASTOLIC BLOOD PRESSURE: 73 MMHG | HEART RATE: 68 BPM | TEMPERATURE: 97.9 F | OXYGEN SATURATION: 100 %

## 2018-02-14 DIAGNOSIS — M54.50 ACUTE BILATERAL LOW BACK PAIN WITHOUT SCIATICA: Primary | ICD-10-CM

## 2018-02-14 DIAGNOSIS — Z91.09 CHEMICAL SENSITIVITY: ICD-10-CM

## 2018-02-14 PROCEDURE — 99214 OFFICE O/P EST MOD 30 MIN: CPT | Performed by: FAMILY MEDICINE

## 2018-02-14 NOTE — MR AVS SNAPSHOT
After Visit Summary   2/14/2018    Nenita Goins    MRN: [id number]           Patient Information     Date Of Birth          1955        Visit Information        Provider Department      2/14/2018 9:40 AM Perla Garsia MD [facility]        Today's Diagnoses     Acute bilateral low back pain without sciatica    -  1      Care Instructions        When You Have Low Back Pain    Caring for Your Back  You are not alone.    Low back pain is very common. Nearly half of all adults have low back pain in any given year. The good news is that back pain is rarely a danger to your health. Most people can manage their back pain on their own and about half of them start feeling better within 2 weeks. In 9 out of 10 cases, low back pain goes away or no longer limits daily activity within 6 weeks.     Your outlook is good!    Your symptoms tell us that your low back pain is most likely not a danger to you. Most of the time we do not know the exact cause of low back pain, even if you see a doctor or have an MRI. However, treatment can still work without knowing the cause of the pain. Less than 1 in 100 people need surgery for their back pain.     What can I do about my low back pain?     There are three things you can do to ease low back pain and help it go away.    Use heat or cold packs.    Take medicine as directed.    Use positions, movements and exercises.     Using heat or cold packs    Try cold packs or gentle heat to ease your pain. Use whichever gives you the most relief. Apply the cold pack or heat for 15 minutes at a time, as often as needed.    Taking medicine      If your doctor has prescribed medicine, be sure to follow the directions.    If you take over-the-counter medicine, read and follow the directions.    Talk to your doctor if you have any questions.     Using positions, movements and exercises    Research tells us that moving your joints and muscles can help you recover  from back pain. Such activity should be simple and gentle. Use the positions in the photos as well as walking to help relieve your pain. Try taking a short walk every 3 to 4 hours during the day. Walk for a few minutes inside your home or take longer walks outside, on a treadmill or at a mall. Slowly increase the amount of time you walk. Expect discomfort when you begin, but it should lessen as your back starts to heal. When your back feels better, walk daily to keep your back and body healthy.    Finding a comfortable position    When your back pain is new, certain positions will ease your pain. Gently try each of the positions below until you find one that is helpful. Once you find a position of comfort, use it as often as you like when you are resting. You will recover faster if you combine rest with activity.         Lie on your back with your legs bent. You can do this by placing a pillow under your knees. Or you may lie on the floor and rest your lower legs on the seat of a chair.       Lie on your side with your knees bent, and place a pillow between your knees.       Lie on your stomach over pillows.      When should I call my doctor?    Your back pain should improve over the first couple of weeks. As it improves, you should be able to return to your normal activities. But call your doctor if:    You have a sudden change in your ability to control your bladder or bowels.    You feel tingling in your groin or legs.    The pain spreads down your leg and into your foot.    Your toes, feet or leg muscles feel weak.    You feel generally unwell or sick.    Your pain does not get better or gets worse.      If you are deaf or hard of hearing, please let us know. We provide many free services including sign language interpreters,oral interpreters, TTYs, telephone amplifiers, note takers and written materials.    For informational purposes only. Not to replace the advice of your health care provider. Copyright   2013  [facility]. All rights reserved. [employer] 270914 - Rev 06/14.           Follow-ups after your visit        Additional Services     [facility] PT, HAND, AND CHIROPRACTIC REFERRAL       **This order will print in the [facility] Scheduling Office**    Physical Therapy, Hand Therapy and Chiropractic Care are available through:    *[facility]  *[facility]  *[facility]    Call one number to schedule at any of the above locations: [phone].    Your provider has referred you to: Physical Therapy at [facility] or [facility]    Indication/Reason for Referral: Low Back Pain  Onset of Illness: 2/12/2018  Therapy Orders: Evaluate and Treat  Special Programs: None  Special Request: None    Myke Stringer      Additional Comments for the Therapist or Chiropractor:     Please be aware that coverage of these services is subject to the terms and limitations of your health insurance plan.  Call member services at your health plan with any benefit or coverage questions.      Please bring the following to your appointment:    *Your personal calendar for scheduling future appointments  *Comfortable clothing                  Your next 10 appointments already scheduled     Feb 22, 2018  9:15 AM CST   (Arrive by 9:00 AM)   MA SCREENING DIGITAL BILATERAL with [id number]   [facility] ([facility])    [address] [place] [address], [address]  [facility] [address] [phone]           Do not use any powder, lotion or deodorant under your arms or on your breast. If you do, we will ask you to remove it before your exam.  Wear comfortable, two-piece clothing.  If you have any allergies, tell your care team.  Bring any previous mammograms from other facilities or have them mailed to the breast center.              Who to contact     If you have questions or need follow up information about today's clinic visit or your schedule please contact [facility] directly at  [phone].  Normal or non-critical lab and imaging results will be communicated to you by [employer], letter or phone within 4 business days after the clinic has received the results. If you do not hear from us within 7 days, please contact the clinic through [employer] or phone. If you have a critical or abnormal lab result, we will notify you by phone as soon as possible.  Submit refill requests through [employer] or call your pharmacy and they will forward the refill request to us. Please allow 3 business days for your refill to be completed.          Additional Information About Your Visit        [employer] Information     [employer] gives you secure access to your electronic health record. If you see a primary care provider, you can also send messages to your care team and make appointments. If you have questions, please call your primary care clinic.  If you do not have a primary care provider, please call [phone] and they will assist you.        Care EveryWhere ID     This is your Care EveryWhere ID. This could be used by other organizations to access your [facility] medical records  [id number]        Your Vitals Were     Pulse Temperature Respirations Last Period Pulse Oximetry BMI (Body Mass Index)    68 97.9  F (36.6  C) (Oral) 17 03/01/2008 100% 31.14 kg/m2       Blood Pressure from Last 3 Encounters:   02/14/18 117/73   02/05/18 124/82   01/08/18 163/81    Weight from Last 3 Encounters:   02/14/18 184 lb 4 oz (83.6 kg)   02/05/18 186 lb 8 oz (84.6 kg)   01/08/18 186 lb 14.4 oz (84.8 kg)              We Performed the Following     [facility] PT, HAND, AND CHIROPRACTIC REFERRAL          Today's Medication Changes          These changes are accurate as of 2/14/18 10:13 AM.  If you have any questions, ask your nurse or doctor.               Start taking these medicines.        Dose/Directions    COMPOUNDED NON-CONTROLLED SUBSTANCE - PHARMACY TO MIX COMPOUNDED MEDICATION   Commonly known as:  CMPD RX   Used for:  Acute  bilateral low back pain without sciatica   Started by:  Perla Garsia MD        Ibuprofen 200 mg every 4 hours prn pain   Quantity:  50 each   Refills:  0            Where to get your medicines      These medications [facility] [place], [place] - [address]  [address], [facility] [address]     Phone:  [phone]     COMPOUNDED NON-CONTROLLED SUBSTANCE - PHARMACY TO MIX COMPOUNDED MEDICATION                Primary Care Provider Office Phone # Fax #    Perla Garsia [phone] [address]  [facility] [address]        Equal Access to Services     [facility]: Hadii aad ku hadasho Soomaali, waaxda luqadaha, qaybta kaalmada adeegyada, waxleena ross hayaan adeashleigh puga . So [facility] [phone].    ATENCIÓN: Si habla español, tiene a rogers disposición servicios gratuitos de asistencia lingüística. Llame al [phone].    We comply with applicable federal civil rights laws and [place] laws. We do not discriminate on the basis of race, color, national origin, age, disability, sex, sexual orientation, or gender identity.            Thank you!     Thank you for choosing [facility]  for your care. Our goal is always to provide you with excellent care. Hearing back from our patients is one way we can continue to improve our services. Please take a few minutes to complete the written survey that you may receive in the mail after your visit with us. Thank you!             Your Updated Medication List - Protect others around you: Learn how to safely use, store and throw away your medicines at www.disposemymeds.org.          This list is accurate as of 2/14/18 10:13 AM.  Always use your most recent med list.                   Brand Name Dispense Instructions for use Diagnosis    calcium & magnesium carbonates 311-232 MG Tabs           COENZYME Q-10 PO      Take  by mouth.        COMPOUNDED NON-CONTROLLED SUBSTANCE - PHARMACY  TO MIX COMPOUNDED MEDICATION    CMPD RX    50 each    Ibuprofen 200 mg every 4 hours prn pain    Acute bilateral low back pain without sciatica

## 2018-02-14 NOTE — PATIENT INSTRUCTIONS
When You Have Low Back Pain    Caring for Your Back  You are not alone.    Low back pain is very common. Nearly half of all adults have low back pain in any given year. The good news is that back pain is rarely a danger to your health. Most people can manage their back pain on their own and about half of them start feeling better within 2 weeks. In 9 out of 10 cases, low back pain goes away or no longer limits daily activity within 6 weeks.     Your outlook is good!    Your symptoms tell us that your low back pain is most likely not a danger to you. Most of the time we do not know the exact cause of low back pain, even if you see a doctor or have an MRI. However, treatment can still work without knowing the cause of the pain. Less than 1 in 100 people need surgery for their back pain.     What can I do about my low back pain?     There are three things you can do to ease low back pain and help it go away.    Use heat or cold packs.    Take medicine as directed.    Use positions, movements and exercises.     Using heat or cold packs    Try cold packs or gentle heat to ease your pain. Use whichever gives you the most relief. Apply the cold pack or heat for 15 minutes at a time, as often as needed.    Taking medicine      If your doctor has prescribed medicine, be sure to follow the directions.    If you take over-the-counter medicine, read and follow the directions.    Talk to your doctor if you have any questions.     Using positions, movements and exercises    Research tells us that moving your joints and muscles can help you recover from back pain. Such activity should be simple and gentle. Use the positions in the photos as well as walking to help relieve your pain. Try taking a short walk every 3 to 4 hours during the day. Walk for a few minutes inside your home or take longer walks outside, on a treadmill or at a mall. Slowly increase the amount of time you walk. Expect discomfort when you begin, but it  should lessen as your back starts to heal. When your back feels better, walk daily to keep your back and body healthy.    Finding a comfortable position    When your back pain is new, certain positions will ease your pain. Gently try each of the positions below until you find one that is helpful. Once you find a position of comfort, use it as often as you like when you are resting. You will recover faster if you combine rest with activity.         Lie on your back with your legs bent. You can do this by placing a pillow under your knees. Or you may lie on the floor and rest your lower legs on the seat of a chair.       Lie on your side with your knees bent, and place a pillow between your knees.       Lie on your stomach over pillows.      When should I call my doctor?    Your back pain should improve over the first couple of weeks. As it improves, you should be able to return to your normal activities. But call your doctor if:    You have a sudden change in your ability to control your bladder or bowels.    You feel tingling in your groin or legs.    The pain spreads down your leg and into your foot.    Your toes, feet or leg muscles feel weak.    You feel generally unwell or sick.    Your pain does not get better or gets worse.      If you are deaf or hard of hearing, please let us know. We provide many free services including sign language interpreters,oral interpreters, TTYs, telephone amplifiers, note takers and written materials.    For informational purposes only. Not to replace the advice of your health care provider. Copyright   2013 [facility]. All rights reserved. [employer] 272286 - Rev 06/14.

## 2018-02-15 ENCOUNTER — TELEPHONE (OUTPATIENT)
Dept: FAMILY MEDICINE | Facility: CLINIC | Age: 63
End: 2018-02-15

## 2018-02-16 NOTE — TELEPHONE ENCOUNTER
PA is needed for  mg (Pure) capsule.    Dr. Garsia, did you want to start the PA process or try an alternative?    Plan: ADY CHAPPELL  Pt ID:[id number]  BIN: [id number]  PCN:[id number]  Phone: [phone]      Ibuprofen powder-NDC: [address]-91  Empty Capsule Size 2 clear caps -NDC: [address]-46    Quincy Cavazos MA

## 2018-02-16 NOTE — TELEPHONE ENCOUNTER
Dr Garsia would like PA started.    Staff called [employer] at [phone] and initiated PA via phone with representative. Waiting for approval/denial. We should hear a determination within 5-7 days.    Case# [id number]    Roberta Cavazos MA

## 2018-02-21 ENCOUNTER — THERAPY VISIT (OUTPATIENT)
Dept: PHYSICAL THERAPY | Facility: CLINIC | Age: 63
End: 2018-02-21
Payer: COMMERCIAL

## 2018-02-21 DIAGNOSIS — M54.50 ACUTE BILATERAL LOW BACK PAIN WITHOUT SCIATICA: Primary | ICD-10-CM

## 2018-02-21 PROCEDURE — 97110 THERAPEUTIC EXERCISES: CPT | Mod: GP | Performed by: PHYSICAL THERAPIST

## 2018-02-21 PROCEDURE — 97161 PT EVAL LOW COMPLEX 20 MIN: CPT | Mod: GP | Performed by: PHYSICAL THERAPIST

## 2018-02-21 NOTE — PROGRESS NOTES
[facility] Initial Evaluation  Subjective:  Patient is a 62 year old female presenting with rehab back hpi.   Nenita Goins is a 62 year old female with a lumbar and thoracic condition.  Condition occurred with:  Twisting.  Condition occurred: at home.  This is a new condition  2/12/18. Patient experienced onset of LBP with turning over in bed on 2/12/18. She had been inactive for most of Jan. 2018 d/t being sick with cold and bad cough. Upon starting to feel better in early February, she increased her activity level with activities such as cleaning her house. She has history back problems. She has not been bothered by her back for a while..    Patient reports pain:  Central lumbar spine, lumbar spine left, lumbar spine right and central thoracic spine.    Pain is described as aching and is intermittent and reported as 2/10.   Pain is worse in the A.M..  Symptoms are exacerbated by sitting, twisting, lifting, certain positions and other (prolonged bending) and relieved by activity/movement.  Since onset symptoms are gradually improving.        General health as reported by patient is good.  Pertinent medical history includes:  Overweight, depression, asthma and menopausal.  Medical allergies: yes (latex, see chart for complete list).  Other surgeries include:  Other (cataract).  Current medications:  Pain medication.  Current occupation is retired.        Barriers include:  None as reported by the patient.    Red flags:  Pain at rest/night (consistent with current back problem).                        Objective:  Standing Alignment:    Cervical/Thoracic:  Forward head  Shoulder/UE:  Rounded shoulders  Lumbar:  Lordosis decr                           Lumbar/SI Evaluation  ROM:    AROM Lumbar:   Flexion:          WNL -  Ext:                    Min loss + B low back   Side Bend:        Left:     Right:   Rotation:           Left:     Right:   Side Glide:        Left:  WNL -    Right:  WNL +  lateral L hip                                                                           Liz Lumbar Evaluation    Posture:  Sitting: poor  Standing: fair  Lordosis: Reduced  Lateral Shift: nil  Correction of Posture: better      Test Movements:  FIS: During: no effect  After: no effect    Repeat FIS: During: increases  After: worse  Mechanical Response: no effect  EIS: During: increases  After: no worse    Repeat EIS: During: increases  After: no worse  Mechanical Response: IncROM            Conclusion: derangement  Principle of Treatment:  Posture Correction: posture correction    Extension: repeated ext in standing                                           ROS    Assessment/Plan:    Patient is a 62 year old female with lumbar complaints.    Patient has the following significant findings with corresponding treatment plan.                Diagnosis 1:  Low back pain  Pain -  hot/cold therapy, manual therapy, self management, education, directional preference exercise and home program  Decreased ROM/flexibility - manual therapy, therapeutic exercise and home program  Decreased proprioception - neuro re-education, therapeutic activities and home program  Impaired muscle performance - neuro re-education and home program  Decreased function - therapeutic activities and home program  Impaired posture - neuro re-education and home program    Therapy Evaluation Codes:   1) History comprised of:   Personal factors that impact the plan of care:      None.    Comorbidity factors that impact the plan of care are:      Asthma, Depression, Menopausal, Overweight and Pain at night/rest.     Medications impacting care: Pain.  2) Examination of Body Systems comprised of:   Body structures and functions that impact the plan of care:      Lumbar spine.   Activity limitations that impact the plan of care are:      Bathing, Bending, Cooking, Dressing, Lifting, Sitting, Standing, Walking and Sleeping.  3) Clinical presentation  characteristics are:   Stable/Uncomplicated.  4) Decision-Making    Low complexity using standardized patient assessment instrument and/or measureable assessment of functional outcome.  Cumulative Therapy Evaluation is: Low complexity.    Previous and current functional limitations:  (See Goal Flow Sheet for this information)    Short term and Long term goals: (See Goal Flow Sheet for this information)     Communication ability:  Patient appears to be able to clearly communicate and understand verbal and written communication and follow directions correctly.  Treatment Explanation - The following has been discussed with the patient:   RX ordered/plan of care  Anticipated outcomes  Possible risks and side effects  This patient would benefit from PT intervention to resume normal activities.   Rehab potential is good.    Frequency:  1 X week, once daily  Duration:  for 4 weeks tapering to 1 X every other week over 4 weeks  Discharge Plan:  Achieve all LTG.  Independent in home treatment program.  Reach maximal therapeutic benefit.    Please refer to the daily flowsheet for treatment today, total treatment time and time spent performing 1:1 timed codes.

## 2018-02-23 PROBLEM — M54.50 ACUTE BILATERAL LOW BACK PAIN WITHOUT SCIATICA: Status: ACTIVE | Noted: 2018-02-23

## 2018-02-26 NOTE — TELEPHONE ENCOUNTER
PA denied.  Unfortuantely she will need to pay for this herself if she wants the custom compounded ibuprofen.

## 2018-03-08 ENCOUNTER — RADIANT APPOINTMENT (OUTPATIENT)
Dept: MAMMOGRAPHY | Facility: CLINIC | Age: 63
End: 2018-03-08
Attending: FAMILY MEDICINE
Payer: COMMERCIAL

## 2018-03-08 DIAGNOSIS — Z00.00 PREVENTATIVE HEALTH CARE: ICD-10-CM

## 2018-03-08 PROCEDURE — 77067 SCR MAMMO BI INCL CAD: CPT

## 2018-03-14 NOTE — PROGRESS NOTES
"  SUBJECTIVE:  Nenita Goins, a 62 year old female, is here to discuss the following issues:     RASH   Left calf  X months  Itchy but she tries not to scratch  Worsened over the past month  Has tried elmination diet  Had used calendula/witch hazel cream ([employer] brand) but the formula changed.      Problem list and histories reviewed & updated, as indicated.  Patient Active Problem List   Diagnosis     Allergic rhinitis     Uterine fibroid     Overweight (BMI 25.0-29.9)     Dyslipidemia (high LDL; low HDL)     Elevated triglycerides with high cholesterol     GERD (gastroesophageal reflux disease)     Lipoma of skin and subcutaneous tissue     Difficulty hearing     Seborrheic keratoses     THOMAS (generalized anxiety disorder)     Advance care planning     Chemical sensitivity     Acute bilateral low back pain without sciatica       BP Readings from Last 3 Encounters:   03/15/18 122/68   02/14/18 117/73   02/05/18 124/82    Wt Readings from Last 3 Encounters:   03/15/18 183 lb (83 kg)   02/14/18 184 lb 4 oz (83.6 kg)   02/05/18 186 lb 8 oz (84.6 kg)              OBJECTIVE:    /68  Pulse 67  Temp 97.1  F (36.2  C) (Oral)  Resp 14  Ht 5' 4.4\" (1.636 m)  Wt 183 lb (83 kg)  LMP 03/01/2008  SpO2 98%  Breastfeeding? No  BMI 31.02 kg/m2  GEN:  no apparent distress  SKIN: Left posterior calf with diffuse, erythematous, maculopapular rash with scaling and lichenification.    ASSESSMENT/PLAN:  1. Other eczema  2. Chemical sensitivity   Discussed skin care in this patient with multiple chemical sensitivities.  Discussed that whatever started the eczema episode may be a moot point - the causative agent may have resolved but the ongoing itching and scratching may cause the rash to persist.  I recommended she consider Vanicream and consider having compounding pharmacy make a safe triamcinolone cream for her.  She will contact them to determine potential cost and let me know if she'd like a script to go to the " compounding pharmacy for mid-potency steroid cream.  Discussed appropriate use of topical steroid cream.  Use sparingly as instructed.  Do not use repeatedly on one area for more than 2 weeks without supervision by MD.        Patient Instructions   Check out the ingredients of Vanicream.    Check with compounding pharmacy to see if they could make you a triamcinolone cream that would be safe for you to use.  You would not need a large amount.  Even 15 grams may be enough to stop the itch/scratch cycle.        Perla Garsia MD   [facility]

## 2018-03-15 ENCOUNTER — OFFICE VISIT (OUTPATIENT)
Dept: FAMILY MEDICINE | Facility: CLINIC | Age: 63
End: 2018-03-15
Payer: COMMERCIAL

## 2018-03-15 VITALS
BODY MASS INDEX: 31.24 KG/M2 | HEART RATE: 67 BPM | DIASTOLIC BLOOD PRESSURE: 68 MMHG | HEIGHT: 64 IN | WEIGHT: 183 LBS | RESPIRATION RATE: 14 BRPM | SYSTOLIC BLOOD PRESSURE: 122 MMHG | OXYGEN SATURATION: 98 % | TEMPERATURE: 97.1 F

## 2018-03-15 DIAGNOSIS — Z91.09 CHEMICAL SENSITIVITY: ICD-10-CM

## 2018-03-15 DIAGNOSIS — L30.8 OTHER ECZEMA: Primary | ICD-10-CM

## 2018-03-15 PROCEDURE — 99213 OFFICE O/P EST LOW 20 MIN: CPT | Performed by: FAMILY MEDICINE

## 2018-03-15 NOTE — MR AVS SNAPSHOT
After Visit Summary   3/15/2018    Nenita Goins    MRN: [id number]           Patient Information     Date Of Birth          1955        Visit Information        Provider Department      3/15/2018 11:40 AM Perla Garsia MD [facility]        Today's Diagnoses     Other eczema    -  1      Care Instructions    Check out the ingredients of Vanicream.    Check with compounding pharmacy to see if they could make you a triamcinolone cream that would be safe for you to use.  You would not need a large amount.  Even 15 grams may be enough to stop the itch/scratch cycle.                Follow-ups after your visit        Your next 10 appointments already scheduled     Apr 04, 2018  8:50 AM CDT   [facility] Sp[facility] with Heriberto Canales PT   [facility] ([facility])    [address] [place] [id number] [phone]              Who to contact     If you have questions or need follow up information about today's clinic visit or your schedule please contact [facility] directly at [phone].  Normal or non-critical lab and imaging results will be communicated to you by sarvaMAILhart, letter or phone within 4 business days after the clinic has received the results. If you do not hear from us within 7 days, please contact the clinic through sarvaMAILhart or phone. If you have a critical or abnormal lab result, we will notify you by phone as soon as possible.  Submit refill requests through [employer] or call your pharmacy and they will forward the refill request to us. Please allow 3 business days for your refill to be completed.          Additional Information About Your Visit        sarvaMAILhart Information     [employer] gives you secure access to your electronic health record. If you see a primary care provider, you can also send messages to your care team and make appointments. If you have questions, please call your primary care clinic.  If you do not  "have a primary care provider, please call [phone] and they will assist you.        Care EveryWhere ID     This is your Care EveryWhere ID. This could be used by other organizations to access your [facility] medical records  [id number]        Your Vitals Were     Pulse Temperature Respirations Height Last Period Pulse Oximetry    67 97.1  F (36.2  C) (Oral) 14 5' 4.4\" (1.636 m) 03/01/2008 98%    Breastfeeding? BMI (Body Mass Index)                No 31.02 kg/m2           Blood Pressure from Last 3 Encounters:   03/15/18 122/68   02/14/18 117/73   02/05/18 124/82    Weight from Last 3 Encounters:   03/15/18 183 lb (83 kg)   02/14/18 184 lb 4 oz (83.6 kg)   02/05/18 186 lb 8 oz (84.6 kg)              Today, you had the following     No orders found for display       Primary Care Provider Office Phone # Fax #    Perla Garsia [phone] [address] [facility] [address]        Equal Access to Services     [facility]: Hadii tai Perry, jeison curry, basim garcia, maureen puga . So [facility] [phone].    ATENCIÓN: Si habla español, tiene a rogers disposición servicios gratuitos de asistencia lingüística. [facility] [phone].    We comply with applicable federal civil rights laws and [place] laws. We do not discriminate on the basis of race, color, national origin, age, disability, sex, sexual orientation, or gender identity.            Thank you!     Thank you for choosing [facility]  for your care. Our goal is always to provide you with excellent care. Hearing back from our patients is one way we can continue to improve our services. Please take a few minutes to complete the written survey that you may receive in the mail after your visit with us. Thank you!             Your Updated Medication List - Protect others around you: Learn how to safely use, store and throw away your medicines at www.disposemymeds.org. "          This list is accurate as of 3/15/18 12:17 PM.  Always use your most recent med list.                   Brand Name Dispense Instructions for use Diagnosis    calcium & magnesium carbonates 311-232 MG Tabs           COENZYME Q-10 PO      Take  by mouth.        COMPOUNDED NON-CONTROLLED SUBSTANCE - PHARMACY TO MIX COMPOUNDED MEDICATION    CMPD RX    50 each    Ibuprofen 200 mg every 4 hours prn pain    Acute bilateral low back pain without sciatica       FISH OIL PO           LUTEIN PO

## 2018-03-15 NOTE — NURSING NOTE
"Chief Complaint   Patient presents with     Derm Problem     eczema       Initial Temp 97.1  F (36.2  C) (Oral)  Resp 14  Ht 5' 4.4\" (1.636 m)  Wt 183 lb (83 kg)  LMP 03/01/2008  Breastfeeding? No  BMI 31.02 kg/m2 Estimated body mass index is 31.02 kg/(m^2) as calculated from the following:    Height as of this encounter: 5' 4.4\" (1.636 m).    Weight as of this encounter: 183 lb (83 kg).  Medication Reconciliation: complete  "

## 2018-03-15 NOTE — PATIENT INSTRUCTIONS
Check out the ingredients of Vanicream.    Check with compounding pharmacy to see if they could make you a triamcinolone cream that would be safe for you to use.  You would not need a large amount.  Even 15 grams may be enough to stop the itch/scratch cycle.

## 2018-04-30 ENCOUNTER — OFFICE VISIT (OUTPATIENT)
Dept: FAMILY MEDICINE | Facility: CLINIC | Age: 63
End: 2018-04-30
Payer: COMMERCIAL

## 2018-04-30 VITALS
WEIGHT: 184.75 LBS | OXYGEN SATURATION: 99 % | RESPIRATION RATE: 16 BRPM | TEMPERATURE: 97.6 F | SYSTOLIC BLOOD PRESSURE: 137 MMHG | BODY MASS INDEX: 31.32 KG/M2 | HEART RATE: 74 BPM | DIASTOLIC BLOOD PRESSURE: 79 MMHG

## 2018-04-30 DIAGNOSIS — B07.0 PLANTAR WART OF RIGHT FOOT: ICD-10-CM

## 2018-04-30 DIAGNOSIS — M70.62 TROCHANTERIC BURSITIS OF LEFT HIP: Primary | ICD-10-CM

## 2018-04-30 PROCEDURE — 17110 DESTRUCTION B9 LES UP TO 14: CPT | Performed by: FAMILY MEDICINE

## 2018-04-30 PROCEDURE — 99213 OFFICE O/P EST LOW 20 MIN: CPT | Mod: 25 | Performed by: FAMILY MEDICINE

## 2018-04-30 NOTE — PROGRESS NOTES
SUBJECTIVE:   Nenita Goins is a 62 year old female who presents to clinic today for the following health issues:      Musculoskeletal problem/pain      Duration: few months    Description  Location: left hip and right foot pain    Intensity:  mild    Accompanying signs and symptoms: sense of instability - especially when she gets up in the morning    History  Previous similar problem: YES- pain on the side of left hip when mountain climbing  Previous evaluation:  none    Precipitating or alleviating factors:  Trauma or overuse: YES  Aggravating factors include: worse sleeping on left side    Therapies tried and outcome: Ibuprofen, helps with pain      Gradually worsening.  Pain radiates down the outer aspect of her thigh - to mid-thigh.  No associated numbness/tingling of leg.    Right foot has focal pain on outer aspect of sole of foot - feels like she's stepping on a stone - sharp pain    Worker [employer] He[employer] Putty helped her rash.  From [employer].    Problem list and histories reviewed & adjusted, as indicated.  Additional history: as documented    BP Readings from Last 3 Encounters:   04/30/18 137/79   03/15/18 122/68   02/14/18 117/73    Wt Readings from Last 3 Encounters:   04/30/18 184 lb 12 oz (83.8 kg)   03/15/18 183 lb (83 kg)   02/14/18 184 lb 4 oz (83.6 kg)           Reviewed and updated as needed this visit by clinical staff  Tobacco  Allergies  Meds  Med Hx  Surg Hx  Fam Hx  Soc Hx          OBJECTIVE:     /79 (BP Location: Right arm, Patient Position: Chair, Cuff Size: Adult Regular)  Pulse 74  Temp 97.6  F (36.4  C) (Oral)  Resp 16  Wt 184 lb 12 oz (83.8 kg)  LMP 03/01/2008  SpO2 99%  BMI 31.32 kg/m2  Body mass index is 31.32 kg/(m^2).  GENERAL: No apparent distress   HEAD: Normocephalic.  EYES:  No discharge or erythema.   NOSE: Normal without discharge.  SKIN: Clear. No significant rash.  She has a callous overlying the 5th metatarsal head. This was pared with a #15 blade and  revealed a wart  NEUROLOGIC: No focal findings. Cranial nerves grossly intact. Normal gait, strength and tone    HIP:  Left hip with full and painless ROM.  Negative Stinchfield.  Negative ZE.  There is tenderness to palpation over the greater trochanter.     PROCEDURE:  The associated callous was pared using a #15 blade and the wart was treated with liquid nitrogen x 2 with intermediate thaw.  Patient tolerated the procedure well.     ASSESSMENT/PLAN:     1. Trochanteric bursitis of left hip  Discussed relevant anatomy, pathophysiology, and etiology of this condition.  Discussed importance of stretching the IT Band and I offered her Physical Therapy for this.  She'd like to first work with her .   - [facility] PT, HAND, AND CHIROPRACTIC REFERRAL    2. Plantar wart of right foot  Discussed aftercares.  Discussed potential blistering reaction - do not pop any blisters.  If wart persists after next couple of weeks return for re-treatment in about 3 weeks.    - DESTRUCT BENIGN LESION, UP TO 14       Perla Garsia MD  [facility]

## 2018-04-30 NOTE — MR AVS SNAPSHOT
After Visit Summary   4/30/2018    Nenita Goins    MRN: [id number]           Patient Information     Date Of Birth          1955        Visit Information        Provider Department      4/30/2018 10:00 AM Perla Garsia MD [facility]        Today's Diagnoses     Plantar wart of right foot    -  1    Trochanteric bursitis of left hip           Follow-ups after your visit        Additional Services     [facility] PT, HAND, AND CHIROPRACTIC REFERRAL       **This order will print in the [facility] Scheduling Office**    Physical Therapy, Hand Therapy and Chiropractic Care are available through:    *[facility]  *[facility]  *[facility]    Call one number to schedule at any of the above locations: [phone].    Your provider has referred you to: Physical Therapy at [facility] or [facility]    Indication/Reason for Referral: Hip Pain  Onset of Illness: months  Therapy Orders: Evaluate and Treat  Special Programs: None  Special Request: None    Myke Stringer      Additional Comments for the Therapist or Chiropractor: stretch IT Band    Please be aware that coverage of these services is subject to the terms and limitations of your health insurance plan.  Call member services at your health plan with any benefit or coverage questions.      Please bring the following to your appointment:    *Your personal calendar for scheduling future appointments  *Comfortable clothing                  Who to contact     If you have questions or need follow up information about today's clinic visit or your schedule please contact [facility] directly at [phone].  Normal or non-critical lab and imaging results will be communicated to you by MyChart, letter or phone within 4 business days after the clinic has received the results. If you do not hear from us within 7 days, please contact the clinic through MyChart or phone. If you have a critical or  abnormal lab result, we will notify you by phone as soon as possible.  Submit refill requests through [employer] or call your pharmacy and they will forward the refill request to us. Please allow 3 business days for your refill to be completed.          Additional Information About Your Visit        M[employer] MedSystemshart Information     [employer] gives you secure access to your electronic health record. If you see a primary care provider, you can also send messages to your care team and make appointments. If you have questions, please call your primary care clinic.  If you do not have a primary care provider, please call [phone] and they will assist you.        Care EveryWhere ID     This is your Care EveryWhere ID. This could be used by other organizations to access your [facility] medical records  [id number]        Your Vitals Were     Pulse Temperature Respirations Last Period Pulse Oximetry BMI (Body Mass Index)    74 97.6  F (36.4  C) (Oral) 16 03/01/2008 99% 31.32 kg/m2       Blood Pressure from Last 3 Encounters:   04/30/18 137/79   03/15/18 122/68   02/14/18 117/73    Weight from Last 3 Encounters:   04/30/18 184 lb 12 oz (83.8 kg)   03/15/18 183 lb (83 kg)   02/14/18 184 lb 4 oz (83.6 kg)              We Performed the Following     [facility] PT, HAND, AND CHIROPRACTIC REFERRAL          Today's Medication Changes          These changes are accurate as of 4/30/18 10:55 AM.  If you have any questions, ask your nurse or doctor.               Stop taking these medicines if you haven't already. Please contact your care team if you have questions.     calcium & magnesium carbonates 311-232 MG Tabs   Stopped by:  Perla Garsia MD                    Primary Care Provider Office Phone # Fax #    Perla Garsia [phone] [address] [place] [address]        Equal Access to Services     ANGELICA TREJO : Maritza Perry, jeison curry, maureen malave  marti estevezelianasamira roblesKaydenalia ah. So [facility] [phone].    ATENCIÓN: Si habla tyree, tiene a rogers disposición servicios gratuitos de asistencia lingüística. Yoselyn al [phone].    We comply with applicable federal civil rights laws and [place] laws. We do not discriminate on the basis of race, color, national origin, age, disability, sex, sexual orientation, or gender identity.            Thank you!     Thank you for choosing [facility]  for your care. Our goal is always to provide you with excellent care. Hearing back from our patients is one way we can continue to improve our services. Please take a few minutes to complete the written survey that you may receive in the mail after your visit with us. Thank you!             Your Updated Medication List - Protect others around you: Learn how to safely use, store and throw away your medicines at www.disposemymeds.org.          This list is accurate as of 4/30/18 10:55 AM.  Always use your most recent med list.                   Brand Name Dispense Instructions for use Diagnosis    COENZYME Q-10 PO      Take  by mouth.        COMPOUNDED NON-CONTROLLED SUBSTANCE - PHARMACY TO MIX COMPOUNDED MEDICATION    CMPD RX    50 each    Ibuprofen 200 mg every 4 hours prn pain    Acute bilateral low back pain without sciatica       FISH OIL PO           LUTEIN PO

## 2018-05-23 ENCOUNTER — MYC REFILL (OUTPATIENT)
Dept: FAMILY MEDICINE | Facility: CLINIC | Age: 63
End: 2018-05-23

## 2018-05-23 ENCOUNTER — MYC MEDICAL ADVICE (OUTPATIENT)
Dept: FAMILY MEDICINE | Facility: CLINIC | Age: 63
End: 2018-05-23

## 2018-05-23 DIAGNOSIS — M54.50 ACUTE BILATERAL LOW BACK PAIN WITHOUT SCIATICA: ICD-10-CM

## 2018-05-23 NOTE — TELEPHONE ENCOUNTER
Message from Tripwolfhart:  Original authorizing provider: MD Nenita Albert would like a refill of the following medications:  COMPOUNDED NON-CONTROLLED SUBSTANCE (CMPD RX) - PHARMACY TO MIX COMPOUNDED MEDICATION [Perla Garsia MD]    Preferred pharmacy: [facility] - [place], [place] - [facility] [address]    Comment:  I need a refill for Ibuprofen 200mg (pure) capsules.

## 2018-05-24 NOTE — TELEPHONE ENCOUNTER
Requested Prescriptions   Pending Prescriptions Disp Refills     COMPOUNDED NON-CONTROLLED SUBSTANCE (CMPD RX) - PHARMACY TO MIX COMPOUNDED MEDICATION 50 each 0     Sig: Ibuprofen 200 mg every 4 hours prn pain    There is no refill protocol information for this order   Last Written Prescription Date:  2/14/18  Last Fill Quantity: 50,  # refills: 0   Last office visit: 4/30/2018 with prescribing provider:     Future Office Visit:

## 2018-10-21 NOTE — PROGRESS NOTES
"SUBJECTIVE:  Nenita Goins, a 63 year old female, is here to discuss the following issues:     Increased joint pain  Headaches/stiff neck, sinus pressure  Feels the sinus pressure was caused by sleeping on her back during hayfever season.  Feels the head/neck pain is related to use of new smart phone.  Headache is described as a muscle tension - circumferential band of pain   Both sinus pressure and head/neck pain have improved.    Hip pain  Interfering with sleep.  Hips feel \"unstable\" - like they could give-way.  Can't sit for too long.   Our prior plan for her hip pain was for her to do yoga with niece and possibly PT but she never did.      Lot of stress with her apartment building managers wanting to spray lawn with chemicals.  This is very upsetting to her given her chemical sensitivities.    Feet and ankles felt burning and numb/tingly when lying in bed one night.    Hasn't recurred.    Left arm pain    Fatigue. Can sleep 10 hours and does feel refreshed then.    She notes she has many good intentions to be more active - she intends to walk, go the the group yoga class at her senior apartment building.  She knows she feels better when she does so but struggles to initiate.      She uses custom compounded ibuprofen and would like that renewed.    Problem list and histories reviewed & updated, as indicated.  Patient Active Problem List   Diagnosis     Allergic rhinitis     Uterine fibroid     Overweight (BMI 25.0-29.9)     Dyslipidemia (high LDL; low HDL)     Elevated triglycerides with high cholesterol     GERD (gastroesophageal reflux disease)     Lipoma of skin and subcutaneous tissue     Difficulty hearing     Seborrheic keratoses     THOMAS (generalized anxiety disorder)     Advance care planning     Chemical sensitivity     Acute bilateral low back pain without sciatica       BP Readings from Last 3 Encounters:   10/24/18 129/86   04/30/18 137/79   03/15/18 122/68    Wt Readings from Last 3 Encounters: "   10/24/18 185 lb (83.9 kg)   04/30/18 184 lb 12 oz (83.8 kg)   03/15/18 183 lb (83 kg)           ROS:  12 point ROS of systems including Constitutional, Eyes, ENT, Respiratory, Cardiovascular, Gastroenterology, Genitourinary, Integumentary, Muscularskeletal, Neurologic, Psychiatric, and Hematologic/Lymphatic were all negative except for pertinent positives noted in my HPI.     OBJECTIVE:    /86  Pulse 71  Temp 98  F (36.7  C) (Oral)  Wt 185 lb (83.9 kg)  LMP 03/01/2008  SpO2 98%  BMI 31.36 kg/m2  GENERAL: No apparent distress   HEAD: Normocephalic.  EYES:  No discharge or erythema.   NOSE: Sounds congested  LUNGS: Normal respiratory effort.  Clear to auscultation - no rales, rhonchi or wheezing.  HEART: Regular rhythm. Normal S1/S2. No murmurs.  SKIN: Clear. No significant rash, abnormal pigmentation or lesions  MS: No apparent synovitis.  Hips with FROM.  Mild left hip pain with internal rotation.    NEURO:  No focal deficits noted, No facial asymmetry, Equal movement of all 4 extremities, Strength grossly intact, CN II-XII intact, Finger-nose-finger intact      ASSESSMENT/PLAN:  1. Pain of both hip joints  I still think this is more of a trochanteric bursitis.  I'd like start treating her hips with Physical Therapy.    - [facility] PT, HAND, AND CHIROPRACTIC REFERRAL; Future  - COMPOUNDED NON-CONTROLLED SUBSTANCE (CMPD RX) - PHARMACY TO MIX COMPOUNDED MEDICATION; Ibuprofen 200 mg every 4 hours prn pain  Dispense: 50 each; Refill: 0    2. Other fatigue  I suspect fibromyalgia and focused our discussion on the importance of moving more.  However, we will also do basic workup for worsening fatigue.  SHONNA is also a possibility to consider.    - **Basic metabolic panel FUTURE anytime; Future  - CBC with platelets differential; Future    3. Seasonal allergic rhinitis, unspecified trigger  Discussed option of formal allergy testing.  She has not done so in over 30 years.  She did try allergy shots when she was in  her 20's.  She might consider that again.      4. Episodic tension-type headache, not intractable  Improved with adjustment of sleep position and smart phone use.    5. Lipid screening  - Lipid panel reflex to direct LDL Fasting; Future    6. Screening for human immunodeficiency virus  - **HIV Antigen Antibody Combo FUTURE anytime; Future       Patient Instructions   Try a reward system to encourage yourself to get out and walk or do the yoga.        Perla Garsia MD   [facility]

## 2018-10-24 ENCOUNTER — OFFICE VISIT (OUTPATIENT)
Dept: FAMILY MEDICINE | Facility: CLINIC | Age: 63
End: 2018-10-24
Payer: COMMERCIAL

## 2018-10-24 VITALS
TEMPERATURE: 98 F | BODY MASS INDEX: 31.36 KG/M2 | DIASTOLIC BLOOD PRESSURE: 86 MMHG | SYSTOLIC BLOOD PRESSURE: 129 MMHG | HEART RATE: 71 BPM | WEIGHT: 185 LBS | OXYGEN SATURATION: 98 %

## 2018-10-24 DIAGNOSIS — J30.2 SEASONAL ALLERGIC RHINITIS, UNSPECIFIED TRIGGER: ICD-10-CM

## 2018-10-24 DIAGNOSIS — R53.83 OTHER FATIGUE: ICD-10-CM

## 2018-10-24 DIAGNOSIS — M25.552 PAIN OF BOTH HIP JOINTS: Primary | ICD-10-CM

## 2018-10-24 DIAGNOSIS — G44.219 EPISODIC TENSION-TYPE HEADACHE, NOT INTRACTABLE: ICD-10-CM

## 2018-10-24 DIAGNOSIS — Z13.220 LIPID SCREENING: ICD-10-CM

## 2018-10-24 DIAGNOSIS — M25.551 PAIN OF BOTH HIP JOINTS: Primary | ICD-10-CM

## 2018-10-24 DIAGNOSIS — Z11.4 SCREENING FOR HUMAN IMMUNODEFICIENCY VIRUS: ICD-10-CM

## 2018-10-24 PROCEDURE — 99214 OFFICE O/P EST MOD 30 MIN: CPT | Performed by: FAMILY MEDICINE

## 2018-10-24 NOTE — MR AVS SNAPSHOT
After Visit Summary   10/24/2018    Nenita Goins    MRN: [id number]           Patient Information     Date Of Birth          1955        Visit Information        Provider Department      10/24/2018 2:00 PM Perla Garsia MD [facility]        Today's Diagnoses     Pain of both hip joints    -  1      Care Instructions    Try a reward system to encourage yourself to get out and walk or do the yoga.            Follow-ups after your visit        Additional Services     [facility] PT, HAND, AND CHIROPRACTIC REFERRAL       Physical Therapy, Hand Therapy and Chiropractic Care are available through:  *[facility]  *Hand Therapy (Occupational Therapy or Physical Therapy)  *[facility]    Call one number to schedule at any of the above locations: [phone].    Physical therapy, Hand therapy and/or Chiropractic care has been recommended by your physician as an excellent treatment option to reduce pain and help people return to normal activities, including sports.  Therapy and/or chiropractic care services are a great complement or alternative to expensive and invasive surgery, injections, or long-term use of prescription medications. The primary goal is to identify the underlying problem and provide you the tools to manage your condition on your own.     Please be aware that coverage of these services is subject to the terms and limitations of your health insurance plan.  Call member services at your health plan with any benefit or coverage questions.      Please bring the following to your appointment:  *Your personal calendar for scheduling future appointments  *Comfortable clothing                  Future tests that were ordered for you today     Open Future Orders        Priority Expected Expires Ordered    [facility] PT, HAND, AND CHIROPRACTIC REFERRAL Routine  10/24/2019 10/24/2018            Who to contact     If you have questions or need follow  up information about today's clinic visit or your schedule please contact [facility] directly at [phone].  Normal or non-critical lab and imaging results will be communicated to you by MyChart, letter or phone within 4 business days after the clinic has received the results. If you do not hear from us within 7 days, please contact the clinic through [employer] or phone. If you have a critical or abnormal lab result, we will notify you by phone as soon as possible.  Submit refill requests through [employer] or call your pharmacy and they will forward the refill request to us. Please allow 3 business days for your refill to be completed.          Additional Information About Your Visit        [employer] Information     [employer] gives you secure access to your electronic health record. If you see a primary care provider, you can also send messages to your care team and make appointments. If you have questions, please call your primary care clinic.  If you do not have a primary care provider, please call [phone] and they will assist you.        Care EveryWhere ID     This is your Care EveryWhere ID. This could be used by other organizations to access your [place] medical records  [id number]        Your Vitals Were     Pulse Temperature Last Period Pulse Oximetry BMI (Body Mass Index)       71 98  F (36.7  C) (Oral) 03/01/2008 98% 31.36 kg/m2        Blood Pressure from Last 3 Encounters:   10/24/18 129/86   04/30/18 137/79   03/15/18 122/68    Weight from Last 3 Encounters:   10/24/18 185 lb (83.9 kg)   04/30/18 184 lb 12 oz (83.8 kg)   03/15/18 183 lb (83 kg)               Primary Care Provider Office Phone # Fax #    Perla Garsia [phone] [address]  [facility] [address]        Equal Access to Services     ANGELICA TREJO : Maritza Perry, waaxda luqadaha, qaybta kaalmada jose, maureen morgan. So [facility]  [phone].    ATENCIÓN: Si raquel clements, tiene a rogers disposición servicios gratuitos de asistencia lingüística. Yoselyn elliott [phone].    We comply with applicable federal civil rights laws and [place] laws. We do not discriminate on the basis of race, color, national origin, age, disability, sex, sexual orientation, or gender identity.            Thank you!     Thank you for choosing [facility]  for your care. Our goal is always to provide you with excellent care. Hearing back from our patients is one way we can continue to improve our services. Please take a few minutes to complete the written survey that you may receive in the mail after your visit with us. Thank you!             Your Updated Medication List - Protect others around you: Learn how to safely use, store and throw away your medicines at www.disposemymeds.org.          This list is accurate as of 10/24/18  2:43 PM.  Always use your most recent med list.                   Brand Name Dispense Instructions for use Diagnosis    COENZYME Q-10 PO      Take  by mouth.        COMPOUNDED NON-CONTROLLED SUBSTANCE - PHARMACY TO MIX COMPOUNDED MEDICATION    CMPD RX    50 each    Ibuprofen 200 mg every 4 hours prn pain    Acute bilateral low back pain without sciatica       FISH OIL PO           LUTEIN PO

## 2018-10-30 ENCOUNTER — THERAPY VISIT (OUTPATIENT)
Dept: PHYSICAL THERAPY | Facility: CLINIC | Age: 63
End: 2018-10-30
Payer: COMMERCIAL

## 2018-10-30 DIAGNOSIS — M54.50 ACUTE BILATERAL LOW BACK PAIN WITHOUT SCIATICA: ICD-10-CM

## 2018-10-30 DIAGNOSIS — M25.552 PAIN OF BOTH HIP JOINTS: ICD-10-CM

## 2018-10-30 DIAGNOSIS — Z53.9 ERRONEOUS ENCOUNTER--DISREGARD: Primary | ICD-10-CM

## 2018-10-30 DIAGNOSIS — M25.551 PAIN OF BOTH HIP JOINTS: ICD-10-CM

## 2018-10-30 NOTE — PROGRESS NOTES
Patient has chemical sensitivity to new carpet in PT clinic and is unable to undergo PT evaluation. No charge.

## 2018-10-31 ENCOUNTER — ALLIED HEALTH/NURSE VISIT (OUTPATIENT)
Dept: NURSING | Facility: CLINIC | Age: 63
End: 2018-10-31
Payer: COMMERCIAL

## 2018-10-31 DIAGNOSIS — Z11.4 SCREENING FOR HUMAN IMMUNODEFICIENCY VIRUS: ICD-10-CM

## 2018-10-31 DIAGNOSIS — R53.83 OTHER FATIGUE: ICD-10-CM

## 2018-10-31 DIAGNOSIS — Z23 NEED FOR PROPHYLACTIC VACCINATION AND INOCULATION AGAINST INFLUENZA: Primary | ICD-10-CM

## 2018-10-31 DIAGNOSIS — Z13.220 LIPID SCREENING: ICD-10-CM

## 2018-10-31 LAB
ANION GAP SERPL CALCULATED.3IONS-SCNC: 11 MMOL/L (ref 3–14)
BASOPHILS # BLD AUTO: 0 10E9/L (ref 0–0.2)
BASOPHILS NFR BLD AUTO: 0.7 %
BUN SERPL-MCNC: 15 MG/DL (ref 7–30)
CALCIUM SERPL-MCNC: 9.4 MG/DL (ref 8.5–10.1)
CHLORIDE SERPL-SCNC: 108 MMOL/L (ref 94–109)
CHOLEST SERPL-MCNC: 199 MG/DL
CO2 SERPL-SCNC: 23 MMOL/L (ref 20–32)
CREAT SERPL-MCNC: 0.8 MG/DL (ref 0.52–1.04)
DIFFERENTIAL METHOD BLD: NORMAL
EOSINOPHIL # BLD AUTO: 0.3 10E9/L (ref 0–0.7)
EOSINOPHIL NFR BLD AUTO: 5.3 %
ERYTHROCYTE [DISTWIDTH] IN BLOOD BY AUTOMATED COUNT: 12.5 % (ref 10–15)
GFR SERPL CREATININE-BSD FRML MDRD: 72 ML/MIN/1.7M2
GLUCOSE SERPL-MCNC: 104 MG/DL (ref 70–99)
HCT VFR BLD AUTO: 43.8 % (ref 35–47)
HDLC SERPL-MCNC: 37 MG/DL
HGB BLD-MCNC: 14.6 G/DL (ref 11.7–15.7)
HIV 1+2 AB+HIV1 P24 AG SERPL QL IA: NONREACTIVE
LDLC SERPL CALC-MCNC: 136 MG/DL
LYMPHOCYTES # BLD AUTO: 1.6 10E9/L (ref 0.8–5.3)
LYMPHOCYTES NFR BLD AUTO: 27.5 %
MCH RBC QN AUTO: 31.5 PG (ref 26.5–33)
MCHC RBC AUTO-ENTMCNC: 33.3 G/DL (ref 31.5–36.5)
MCV RBC AUTO: 94 FL (ref 78–100)
MONOCYTES # BLD AUTO: 0.5 10E9/L (ref 0–1.3)
MONOCYTES NFR BLD AUTO: 8.8 %
NEUTROPHILS # BLD AUTO: 3.3 10E9/L (ref 1.6–8.3)
NEUTROPHILS NFR BLD AUTO: 57.7 %
NONHDLC SERPL-MCNC: 162 MG/DL
PLATELET # BLD AUTO: 257 10E9/L (ref 150–450)
POTASSIUM SERPL-SCNC: 4 MMOL/L (ref 3.4–5.3)
RBC # BLD AUTO: 4.64 10E12/L (ref 3.8–5.2)
SODIUM SERPL-SCNC: 142 MMOL/L (ref 133–144)
TRIGL SERPL-MCNC: 130 MG/DL
WBC # BLD AUTO: 5.7 10E9/L (ref 4–11)

## 2018-10-31 PROCEDURE — 85025 COMPLETE CBC W/AUTO DIFF WBC: CPT | Performed by: FAMILY MEDICINE

## 2018-10-31 PROCEDURE — 90686 IIV4 VACC NO PRSV 0.5 ML IM: CPT

## 2018-10-31 PROCEDURE — 80048 BASIC METABOLIC PNL TOTAL CA: CPT | Performed by: FAMILY MEDICINE

## 2018-10-31 PROCEDURE — 80061 LIPID PANEL: CPT | Performed by: FAMILY MEDICINE

## 2018-10-31 PROCEDURE — 36415 COLL VENOUS BLD VENIPUNCTURE: CPT | Performed by: FAMILY MEDICINE

## 2018-10-31 PROCEDURE — 87389 HIV-1 AG W/HIV-1&-2 AB AG IA: CPT | Performed by: FAMILY MEDICINE

## 2018-10-31 PROCEDURE — 90471 IMMUNIZATION ADMIN: CPT

## 2018-10-31 PROCEDURE — 99207 ZZC NO CHARGE NURSE ONLY: CPT

## 2018-10-31 NOTE — MR AVS SNAPSHOT
After Visit Summary   10/31/2018    Nenita Goins    MRN: [id number]           Patient Information     Date Of Birth          1955        Visit Information        Provider Department      10/31/2018 8:30 AM  FLU CLINIC NURSE [facility]        Today's Diagnoses     Need for prophylactic vaccination and inoculation against influenza    -  1       Follow-ups after your visit        Who to contact     If you have questions or need follow up information about today's clinic visit or your schedule please contact [facility] directly at [phone].  Normal or non-critical lab and imaging results will be communicated to you by [employer], letter or phone within 4 business days after the clinic has received the results. If you do not hear from us within 7 days, please contact the clinic through [employer] or phone. If you have a critical or abnormal lab result, we will notify you by phone as soon as possible.  Submit refill requests through [employer] or call your pharmacy and they will forward the refill request to us. Please allow 3 business days for your refill to be completed.          Additional Information About Your Visit        MyChart Information     [employer] gives you secure access to your electronic health record. If you see a primary care provider, you can also send messages to your care team and make appointments. If you have questions, please call your primary care clinic.  If you do not have a primary care provider, please call [phone] and they will assist you.        Care EveryWhere ID     This is your Care EveryWhere ID. This could be used by other organizations to access your [facility] medical records  [id number]        Your Vitals Were     Last Period                   03/01/2008            Blood Pressure from Last 3 Encounters:   10/24/18 129/86   04/30/18 137/79   03/15/18 122/68    Weight from Last 3 Encounters:   10/24/18 185 lb (83.9 kg)   04/30/18 184  lb 12 oz (83.8 kg)   03/15/18 183 lb (83 kg)              We Performed the Following     FLU VACCINE, SPLIT VIRUS, IM (QUADRIVALENT) [54315]- >3 YRS     Vaccine Administration, Initial [18944]        Primary Care Provider Office Phone # Fax #    Perla Garsia [phone]       [address]  [facility] [address]        Equal Access to Services     ANGELICA TREJO : Hadii aad ku hadasho Soomaali, waaxda luqadaha, qaybta kaalmada adeegyada, waxay idiin hayaan adeeg khelianash ladaphnie . So [facility] [phone].    ATENCIÓN: Si habla esphilario, tiene a rogers disposición servicios gratuitos de asistencia lingüística. Llame al [phone].    We comply with applicable federal civil rights laws and [place] laws. We do not discriminate on the basis of race, color, national origin, age, disability, sex, sexual orientation, or gender identity.            Thank you!     Thank you for choosing [facility]  for your care. Our goal is always to provide you with excellent care. Hearing back from our patients is one way we can continue to improve our services. Please take a few minutes to complete the written survey that you may receive in the mail after your visit with us. Thank you!             Your Updated Medication List - Protect others around you: Learn how to safely use, store and throw away your medicines at www.disposemymeds.org.          This list is accurate as of 10/31/18  8:34 AM.  Always use your most recent med list.                   Brand Name Dispense Instructions for use Diagnosis    COENZYME Q-10 PO      Take  by mouth.        COMPOUNDED NON-CONTROLLED SUBSTANCE - PHARMACY TO MIX COMPOUNDED MEDICATION    CMPD RX    50 each    Ibuprofen 200 mg every 4 hours prn pain    Pain of both hip joints       FISH OIL PO           LUTEIN PO

## 2018-10-31 NOTE — PROGRESS NOTES
Injectable Influenza Immunization Documentation    1.  Is the person to be vaccinated sick today?  Yes    2. Does the person to be vaccinated have an allergy to a component   of the vaccine?   No  Egg Allergy Algorithm Link    3. Has the person to be vaccinated ever had a serious reaction   to influenza vaccine in the past?   No    4. Has the person to be vaccinated ever had Guillain-Barré syndrome?   No    Form completed by Patient.      .Rosa Maria Peng MA

## 2018-11-02 NOTE — PROGRESS NOTES
Juan Gutierres,  Your complete blood counts and basic metabolic panel (blood salts, blood sugar, and kidney function) results look ok.  Your fasting blood sugar is just slightly elevated, but not in the diabetic range.  Your lipid panel (cholesterol) results are a bit elevated but stable (actually improved from last year).   And your HIV test is negative.  I do wonder if you could have fibromyalgia which could explain some of the fatigue you are having.      I was sorry (but not surprised) to hear you could not do Physical Therapy upstairs due to the new carpeting.  Maybe you could try one of the other locations.    Perla Garsia MD

## 2019-03-14 ENCOUNTER — MYC REFILL (OUTPATIENT)
Dept: FAMILY MEDICINE | Facility: CLINIC | Age: 64
End: 2019-03-14

## 2019-03-14 DIAGNOSIS — M25.552 PAIN OF BOTH HIP JOINTS: ICD-10-CM

## 2019-03-14 DIAGNOSIS — M25.551 PAIN OF BOTH HIP JOINTS: ICD-10-CM

## 2019-03-18 NOTE — TELEPHONE ENCOUNTER
COMPOUNDED NON-CONTROLLED SUBSTANCE (CMPD RX) - PHARMACY TO MIX COMPOUNDED MEDICATION      Last Written Prescription Date:  10/24/2018  Last Fill Quantity: 50,   # refills: 0  Last Office Visit: 10/24/2018  Future Office visit:       Routing refill request to provider for review/approval because:  Drug not on the [facility], [facility] or [facility] refill protocol or controlled substance

## 2019-07-12 ENCOUNTER — MYC REFILL (OUTPATIENT)
Dept: FAMILY MEDICINE | Facility: CLINIC | Age: 64
End: 2019-07-12

## 2019-07-12 DIAGNOSIS — M25.551 PAIN OF BOTH HIP JOINTS: ICD-10-CM

## 2019-07-12 DIAGNOSIS — M25.552 PAIN OF BOTH HIP JOINTS: ICD-10-CM

## 2019-07-15 NOTE — TELEPHONE ENCOUNTER
Covering providers-Please review and sign if agree.    Routing refill request to provider for review/approval because:  Drug not on the [facility] refill protocol     Thank you!  RAHAT Cardona, RN            Requested Prescriptions   Pending Prescriptions Disp Refills     COMPOUNDED NON-CONTROLLED SUBSTANCE (CMPD RX) - PHARMACY TO MIX COMPOUNDED MEDICATION 50 each 0     Sig: Ibuprofen 200 mg every 4 hours prn pain       There is no refill protocol information for this order        Creatinine   Date Value Ref Range Status   10/31/2018 0.80 0.52 - 1.04 mg/dL Final     GFR Estimate   Date Value Ref Range Status   10/31/2018 72 >60 mL/min/1.7m2 Final     Comment:     Non  GFR Calc   01/09/2016 74 >60 mL/min/1.7m2 Final     Comment:     Non  GFR Calc   01/19/2009 65 >60 mL/min/1.7m2 Final     GFR Estimate If Black   Date Value Ref Range Status   10/31/2018 87 >60 mL/min/1.7m2 Final     Comment:      GFR Calc   01/09/2016 90 >60 mL/min/1.7m2 Final     Comment:      GFR Calc   01/19/2009 79 >60 mL/min/1.7m2 Final     Lab Results   Component Value Date    ALT 61 02/11/2016     AST   Date Value Ref Range Status   02/11/2016 26 0 - 45 U/L Final

## 2019-09-17 PROBLEM — M54.50 ACUTE BILATERAL LOW BACK PAIN WITHOUT SCIATICA: Status: RESOLVED | Noted: 2018-02-23 | Resolved: 2019-09-17

## 2019-10-04 ENCOUNTER — HEALTH MAINTENANCE LETTER (OUTPATIENT)
Age: 64
End: 2019-10-04

## 2019-11-01 ENCOUNTER — HEALTH MAINTENANCE LETTER (OUTPATIENT)
Age: 64
End: 2019-11-01

## 2019-11-05 NOTE — PROGRESS NOTES
Subjective     Nenita Goins is a 64 year old female who presents to clinic today for the following health issues:    HPI   GERD/Heartburn    Duration: intermittent, past few weeks more often    Description: sore throat as well, intermittent, and she cannot tell if it is related to reflux or allergies - wondering about allergy testing    Intensity:  mild    Accompanying signs and symptoms:  food getting stuck: YES  nausea/vomiting/blood: no   abdominal pain: no   black/tarry or bloody stools: no :    History (similar episodes/previous evaluation): ongoing reflux problem for years    Precipitating or alleviating factors:  worse with pickled herring, fatty foods late in evening.  current NSAID/Aspirin use: no     Therapies tried and outcome: none    GERD symptoms have started to improve since she made some lifestyle changes.    Sore throat, but also has had episodes of tightness in throat.    Throat tightness seems related to eating certain foods such as apple.  Had an episode of burning tongue (resolved)  [place]  She wonders if it is allergies.    Symptoms are starting to recur since she returned to [facility].  She wonders about allergy testing.  Has never been tested for food allergies.  Has had allergy testing for pollens in the past.    She has multiple chemical sensitivities and notes that her apartment building has been getting new carpet.      BP Readings from Last 3 Encounters:   11/06/19 130/82   10/24/18 129/86   04/30/18 137/79    Wt Readings from Last 3 Encounters:   11/06/19 85.3 kg (188 lb)   10/24/18 83.9 kg (185 lb)   04/30/18 83.8 kg (184 lb 12 oz)             Reviewed and updated as needed this visit by Provider  Problems         Review of Systems   ROS COMP: Constitutional, HEENT, pulmonary, gi systems are negative, except as otherwise noted.      Objective    /82 (BP Location: Left arm, Patient Position: Sitting, Cuff Size: Adult Large)   Pulse 67   " Temp 97.9  F (36.6  C) (Tympanic)   Resp 16   Ht 1.632 m (5' 4.25\")   Wt 85.3 kg (188 lb)   LMP 03/01/2008   SpO2 96%   Breastfeeding? No   BMI 32.02 kg/m    Body mass index is 32.02 kg/m .  Physical Exam   GEN:  no apparent distress   EYES: sclerae and conjunctivae clear with no discharge  ENT: external ears and nose without lesions or scars, TM's and canals clear bilaterally and oropharynx clear with moist mucus membranes and normal landmarks  NECK:  Supple without adenopathy, mass, or thyromegaly  LUNGS:  normal respiratory effort, and lungs clear to auscultation bilaterally - no rales, rhonchi or wheezes  ABD:  soft, nontender, no mass, no hepatosplenomegaly, no hernias   SKIN: Clear. No significant rash, abnormal pigmentation or lesions     Diagnostic Test Results:  Labs reviewed in Epic        Assessment & Plan     1. Gastroesophageal reflux disease, esophagitis presence not specified  Recent exacerbation, starting to settle down.  She does not want medication.  Discussed non-medication approaches to managing GERD: avoid dietary triggers including coffee, alcohol, tomatoes, red sauces, mint, and chocolate, avoid eating late in the evening, eat smaller meals, and elevate the head of the bed (not by propping up head with pillows).     2. Sore throat  She is able to take a dye-free diphenhydramine and we discussed that she could try that to see if it improves her throat.  It would also be reasonable to see allergy and she notes that she has gone to the Allergy Dept at [facility] since she was a child so would likely return there.        No follow-ups on file.    Perla Garsia MD  [facility]        "

## 2019-11-06 ENCOUNTER — OFFICE VISIT (OUTPATIENT)
Dept: FAMILY MEDICINE | Facility: CLINIC | Age: 64
End: 2019-11-06
Payer: COMMERCIAL

## 2019-11-06 VITALS
HEART RATE: 67 BPM | BODY MASS INDEX: 32.1 KG/M2 | WEIGHT: 188 LBS | HEIGHT: 64 IN | DIASTOLIC BLOOD PRESSURE: 82 MMHG | SYSTOLIC BLOOD PRESSURE: 130 MMHG | RESPIRATION RATE: 16 BRPM | OXYGEN SATURATION: 96 % | TEMPERATURE: 97.9 F

## 2019-11-06 DIAGNOSIS — J02.9 SORE THROAT: ICD-10-CM

## 2019-11-06 DIAGNOSIS — K21.9 GASTROESOPHAGEAL REFLUX DISEASE, ESOPHAGITIS PRESENCE NOT SPECIFIED: Primary | ICD-10-CM

## 2019-11-06 PROCEDURE — 99213 OFFICE O/P EST LOW 20 MIN: CPT | Performed by: FAMILY MEDICINE

## 2019-11-06 ASSESSMENT — MIFFLIN-ST. JEOR: SCORE: 1396.73

## 2019-12-02 DIAGNOSIS — M25.552 PAIN OF BOTH HIP JOINTS: ICD-10-CM

## 2019-12-02 DIAGNOSIS — M25.551 PAIN OF BOTH HIP JOINTS: ICD-10-CM

## 2019-12-02 NOTE — TELEPHONE ENCOUNTER
COMPOUNDED NON-CONTROLLED SUBSTANCE (CMPD RX) - PHARMACY TO MIX COMPOUNDED MEDICATION      Last Written Prescription Date:  7/15/19  Last Fill Quantity: 50,   # refills: 0  Last Office Visit: 11/6/19  Future Office visit:       Routing refill request to provider for review/approval because:  Drug not on the [facility] refill protocol

## 2019-12-23 ENCOUNTER — MYC MEDICAL ADVICE (OUTPATIENT)
Dept: FAMILY MEDICINE | Facility: CLINIC | Age: 64
End: 2019-12-23

## 2020-02-29 ENCOUNTER — MYC REFILL (OUTPATIENT)
Dept: FAMILY MEDICINE | Facility: CLINIC | Age: 65
End: 2020-02-29

## 2020-02-29 DIAGNOSIS — M25.552 PAIN OF BOTH HIP JOINTS: ICD-10-CM

## 2020-02-29 DIAGNOSIS — M25.551 PAIN OF BOTH HIP JOINTS: ICD-10-CM

## 2020-03-03 NOTE — TELEPHONE ENCOUNTER
Last Written Prescription Date:  12/3/19  Last Fill Quantity: 50 each,  # refills: 0   Last office visit: 11/6/2019 with prescribing provider:     Future Office Visit:    Routing refill request to provider for review/approval because:  Drug not on the [facility] refill protocol   Miesha Carvalho RN

## 2020-06-29 NOTE — PROGRESS NOTES
"Nenita Goins is a 64 year old female who is being evaluated via a billable video visit.      The patient has been notified of following:     \"This video visit will be conducted via a call between you and your physician/provider. We have found that certain health care needs can be provided without the need for an in-person physical exam.  This service lets us provide the care you need with a video conversation.  If a prescription is necessary we can send it directly to your pharmacy.  If lab work is needed we can place an order for that and you can then stop by our lab to have the test done at a later time.    Video visits are billed at different rates depending on your insurance coverage.  Please reach out to your insurance provider with any questions.    If during the course of the call the physician/provider feels a video visit is not appropriate, you will not be charged for this service.\"    Patient has given verbal consent for Video visit? Yes  How would you like to obtain your AVS? MyChart    Will anyone else be joining your video visit? No    Subjective     Nenita Goins is a 64 year old female who presents today via video visit for the following health issues:    HPI     Intermittent chronic ear ache which seems to be interfering with hearing recently, sore throat since last fall, therapies tried: none     L>R hip pain ongoing for several years wondering if she needs labs or imaging, patient wondering if she can just continue with pain until hip pain inhibits her to walk, sometimes hears a click and can no longer move that leg until it clicks back       Video Start Time: 11:53 AM    Intermittent sore throat   Worse recently - right-sided.  Pain under right ear which is sharp if she presses on it.  Right tonsils have white spots   Decreased hearing  Right-sided sinus pressure.  Right maxillary gums are swollen  Has been clenching jaw  No fever  No cough.      Bilateral hip pain  Has been working on " strengthening lower extremities - walking stairs daily - and pain has improved.  However she has clicking in her left hip.       BP Readings from Last 3 Encounters:   11/06/19 130/82   10/24/18 129/86   04/30/18 137/79    Wt Readings from Last 3 Encounters:   07/01/20 81.6 kg (180 lb)   11/06/19 85.3 kg (188 lb)   10/24/18 83.9 kg (185 lb)            Reviewed and updated as needed this visit by Provider  Meds  Problems         Review of Systems   Constitutional, HEENT, cardiovascular, pulmonary, gi and gu systems are negative, except as otherwise noted.      Objective         Physical Exam   GENERAL: Healthy, alert and no distress  EYES: Eyes grossly normal to inspection.  No discharge or erythema, or obvious scleral/conjunctival abnormalities.  RESP: No audible wheeze, cough, or visible cyanosis.  No visible retractions or increased work of breathing.    SKIN: Visible skin clear. No significant rash, abnormal pigmentation or lesions.  NEURO: Cranial nerves grossly intact.  Mentation and speech appropriate for age.  PSYCH: Mentation appears normal, affect normal/bright, judgement and insight intact, normal speech and appearance well-groomed.      Diagnostic Test Results:  Labs reviewed in Epic        Assessment & Plan       ICD-10-CM    1. Earache on right  H92.01    2. Sore throat  J02.9    3. Hip pain, left  M25.552      Given her ear, throat and sinus symptoms I think she needs an in-clinic assessment with ENT exam.  (Cerumen?  AOM?  Sinusitis?  Strep?)    I offered her referral to Sports Med for her hips but she'd prefer to hold off on that for now.    Patient Instructions   I recommend that you have exam in clinic (particularly ENT exam).  No charge for this visit.    Let me know if you want a referral to Sports Med for your hip pain.  I'm not so worried about the clicking but would be more concerned about pain or sense of instability/falling.      No follow-ups on file.    Perla Garsia MD  [place]  [facility]      Video-Visit Details    Type of service:  Video Visit    Video End Time:12:07 PM    Originating Location (pt. Location): Home    Distant Location (provider location):  [facility]     Platform used for Video Visit: [employer]ity    No follow-ups on file.     No charge as I advised in-clinic assessment.    Perla Garsia MD

## 2020-07-01 ENCOUNTER — VIRTUAL VISIT (OUTPATIENT)
Dept: FAMILY MEDICINE | Facility: CLINIC | Age: 65
End: 2020-07-01
Payer: COMMERCIAL

## 2020-07-01 VITALS — HEIGHT: 65 IN | WEIGHT: 180 LBS | BODY MASS INDEX: 29.99 KG/M2

## 2020-07-01 DIAGNOSIS — M25.552 HIP PAIN, LEFT: ICD-10-CM

## 2020-07-01 DIAGNOSIS — H92.01 EARACHE ON RIGHT: Primary | ICD-10-CM

## 2020-07-01 DIAGNOSIS — J02.9 SORE THROAT: ICD-10-CM

## 2020-07-01 PROCEDURE — 99207 ZZC NO BILLABLE SERVICE THIS VISIT: CPT | Performed by: FAMILY MEDICINE

## 2020-07-01 ASSESSMENT — MIFFLIN-ST. JEOR: SCORE: 1372.35

## 2020-07-01 NOTE — PATIENT INSTRUCTIONS
I recommend that you have exam in clinic (particularly ENT exam).  No charge for this visit.    Let me know if you want a referral to Sports Med for your hip pain.  I'm not so worried about the clicking but would be more concerned about pain or sense of instability/falling.

## 2020-07-03 ENCOUNTER — OFFICE VISIT (OUTPATIENT)
Dept: URGENT CARE | Facility: URGENT CARE | Age: 65
End: 2020-07-03
Payer: COMMERCIAL

## 2020-07-03 VITALS
BODY MASS INDEX: 31.92 KG/M2 | HEIGHT: 64 IN | TEMPERATURE: 99.1 F | OXYGEN SATURATION: 99 % | HEART RATE: 72 BPM | RESPIRATION RATE: 16 BRPM | DIASTOLIC BLOOD PRESSURE: 90 MMHG | SYSTOLIC BLOOD PRESSURE: 120 MMHG | WEIGHT: 187 LBS

## 2020-07-03 DIAGNOSIS — J02.9 SORE THROAT: Primary | ICD-10-CM

## 2020-07-03 DIAGNOSIS — H61.23 BILATERAL IMPACTED CERUMEN: ICD-10-CM

## 2020-07-03 LAB
DEPRECATED S PYO AG THROAT QL EIA: NEGATIVE
SPECIMEN SOURCE: NORMAL

## 2020-07-03 PROCEDURE — 69209 REMOVE IMPACTED EAR WAX UNI: CPT | Mod: 50 | Performed by: NURSE PRACTITIONER

## 2020-07-03 PROCEDURE — 99213 OFFICE O/P EST LOW 20 MIN: CPT | Mod: 25 | Performed by: NURSE PRACTITIONER

## 2020-07-03 PROCEDURE — 87651 STREP A DNA AMP PROBE: CPT | Performed by: NURSE PRACTITIONER

## 2020-07-03 ASSESSMENT — ENCOUNTER SYMPTOMS
SORE THROAT: 1
COUGH: 0
SHORTNESS OF BREATH: 0
FEVER: 0
CHILLS: 0
VOMITING: 0
RHINORRHEA: 1
NAUSEA: 0
SINUS PAIN: 0
FATIGUE: 0
SINUS PRESSURE: 0
JOINT SWELLING: 0
DIARRHEA: 0
WHEEZING: 0

## 2020-07-03 ASSESSMENT — MIFFLIN-ST. JEOR: SCORE: 1392.2

## 2020-07-03 NOTE — PATIENT INSTRUCTIONS
Patient Education       Earwax, Home Treatment    Everyone produces earwax from the lining of the ear canal. It serves to lubricate and protect the ear. The wax that forms in the canal naturally moves toward the outside of the ear and falls out. Sometimes the ear canal may contain too much wax. This can cause a blockage and loss of hearing. Directions are given below for home treatment.  Home care  If your doctor has advised you to remove a wax blockage yourself, follow these directions:    Unless a medicine was prescribed, you may use an over-the-counter product made for clearing earwax. These contain carbamide peroxide. Lie down with the blocked ear facing upward. Apply one dropper full of medicine and wait a few minutes. Grasp the outer ear and wiggle it to help the solution enter the canal.    Lean over a sink or basin with the blocked ear facing downward. Use a bulb syringe filled with warm (not hot or cold) water to rinse the ear several times. Use gentle pressure only.    If you are having trouble draining the water out of your ear canal, put a few drops of rubbing alcohol (isopropyl alcohol) into the ear canal. This will help remove the remaining water.    Repeat this procedure once a day for up to three days, or until your hearing is back to normal. Do not use this treatment for more than three days in a row.  Don ts    Don t use cold water to rinse the ear. This will make you dizzy.    Don t perform this procedure if you have an ear infection.    Don t perform this procedure if you have a ruptured eardrum.    Don t use cotton swabs, matches, hairpins, keys, or other objects to  clean  the ear canal. This can cause infection of the ear canal or rupture the eardrum. Because of their size and shape, cotton swabs can push earwax deeper into the ear canal instead of removing it.  Follow-up care  Follow up with your health care provider if you are not improving after three cleaning attempts, or as  advised.  When to seek medical advice  Call your health care provider right away if any of these occur:    Worsening ear pain    Fever of 101 F (38.3 C) or higher, or as directed by your health care provider    Hearing does not return to normal after three days of treatment    Fluid drainage or bleeding from the ear canal    Swelling, redness, or tenderness of the outer ear    Headache, neck pain, or stiff neck    4188-5073 [email or web address]. [address] [place] [address], [place] [id number]. All rights reserved. This information is not intended as a substitute for professional medical care. Always follow your healthcare professional's instructions.

## 2020-07-03 NOTE — PROGRESS NOTES
SUBJECTIVE:   Nenita Goins is a 64 year old female presenting with a chief complaint of   Chief Complaint   Patient presents with     Ear Problem     right ear pain, sinus, throat pain, itching      Urgent Care       She is an established patient of [facility].    URI Adult  Right ear fall but worse in the Spring 2020 and more so over the last couple of weeks  Patient complains of white spots of throat noted about one week   Course of illness is worsening.    Severity moderately severe  Current and Associated symptoms: sore throat just a little bit  Treatment measures tried include Ibuprofen for a headache yesterday but not now.  Predisposing factors include Patient has seasonal allergies     Sore throat 1-1.5/10 with white patches     Review of Systems   Constitutional: Negative for chills, fatigue and fever.   HENT: Positive for ear pain, postnasal drip, rhinorrhea and sore throat. Negative for sinus pressure and sinus pain.    Respiratory: Negative for cough, shortness of breath and wheezing.    Cardiovascular: Negative for chest pain.   Gastrointestinal: Negative for diarrhea, nausea and vomiting.   Musculoskeletal: Negative for joint swelling.   Skin: Negative for rash.       Past Medical History:   Diagnosis Date     Allergic rhinitis     multiple chemical sensitivities     Anal fissure 2015     Anxiety disorder      Breast lump      Chemical sensitivity 2017    Multiple chemical sensitivities     Difficulty hearing 3/12/2012    Evaluated by audiology, mild, no hearing aids needed      Head injury 3/22/2013     Lactose intolerance      Mild intermittent asthma      Plantar tendonitis 2015     Uterine fibroid     multiple large fibroids on US 06. Not currently symptomatic.      Uterine fibroids     multiple large fibroids on US 06     Family History   Problem Relation Age of Onset     FABIANAYULIANA Father      Lipids Father      Coronary Artery Disease Father         , age 52      "Mental Illness Father      Gallbladder Disease Father         and mgm     Arthritis Mother      Hypertension Mother         , age 83     Thyroid Disease Mother      Arrhythmia Mother         atrial fibrulation     Cerebrovascular Disease Mother         atrial fibrulation     Cancer Paternal Grandmother         liver cancer     Breast Cancer Paternal Grandmother         , age 67     Mental Illness Paternal Grandmother      Lipids Sister      Lipids Sister      Hypertension Maternal Grandmother         , age 96     Hyperlipidemia Sister      Anesthesia Reaction Sister      Asthma Sister      Cerebrovascular Disease Other      Diabetes Other         maternal great grandma     Asthma Sister      Cerebrovascular Disease Other         maternal cousin     Diabetes Other      Current Outpatient Medications   Medication Sig Dispense Refill     COENZYME Q-10 PO Take  by mouth.       COMPOUNDED NON-CONTROLLED SUBSTANCE (CMPD RX) - PHARMACY TO MIX COMPOUNDED MEDICATION Ibuprofen 200 mg every 4 hours prn pain 50 each 1     LUTEIN PO        Omega-3 Fatty Acids (FISH OIL PO)        Social History     Tobacco Use     Smoking status: Never Smoker     Smokeless tobacco: Never Used   Substance Use Topics     Alcohol use: Yes     Comment: 1-2 drinks per month       OBJECTIVE  BP (!) 120/90 (BP Location: Right arm, Patient Position: Sitting, Cuff Size: Adult Regular)   Pulse 72   Temp 99.1  F (37.3  C) (Oral)   Resp 16   Ht 1.632 m (5' 4.25\")   Wt 84.8 kg (187 lb)   LMP 2008   SpO2 99%   BMI 31.85 kg/m      Physical Exam  Vitals signs and nursing note reviewed.   Constitutional:       Appearance: Normal appearance.   HENT:      Head: Normocephalic and atraumatic.      Right Ear: There is impacted cerumen.      Left Ear: There is impacted cerumen.   Eyes:      Conjunctiva/sclera: Conjunctivae normal.   Neck:      Musculoskeletal: Normal range of motion.   Cardiovascular:      Rate and Rhythm: Normal " rate and regular rhythm.      Pulses: Normal pulses.   Pulmonary:      Effort: Pulmonary effort is normal.      Breath sounds: Normal breath sounds.   Neurological:      Mental Status: She is alert.   Psychiatric:         Mood and Affect: Mood normal.         Labs:  Results for orders placed or performed in visit on 07/03/20 (from the past 24 hour(s))   Streptococcus A Rapid Scr w Reflx to PCR    Specimen: Throat   Result Value Ref Range    Strep Specimen Description Throat     Streptococcus Group A Rapid Screen Negative NEG^Negative         ASSESSMENT:      ICD-10-CM    1. Sore throat  J02.9 Streptococcus A Rapid Scr w Reflx to PCR     Group A Streptococcus PCR Throat Swab     CANCELED: Rapid strep group A screen POCT   2. Bilateral impacted cerumen  H61.23         Medical Decision Making:    Differential Diagnosis: URI Adult/Peds:  Laryngitis, Strep pharyngitis and Tonsilitis    Serious Comorbid Conditions: reviewed history     PLAN:  RST negative    Patient Instructions     Patient Education       Earwax, Home Treatment    Everyone produces earwax from the lining of the ear canal. It serves to lubricate and protect the ear. The wax that forms in the canal naturally moves toward the outside of the ear and falls out. Sometimes the ear canal may contain too much wax. This can cause a blockage and loss of hearing. Directions are given below for home treatment.  Home care  If your doctor has advised you to remove a wax blockage yourself, follow these directions:    Unless a medicine was prescribed, you may use an over-the-counter product made for clearing earwax. These contain carbamide peroxide. Lie down with the blocked ear facing upward. Apply one dropper full of medicine and wait a few minutes. Grasp the outer ear and wiggle it to help the solution enter the canal.    Lean over a sink or basin with the blocked ear facing downward. Use a bulb syringe filled with warm (not hot or cold) water to rinse the ear several  times. Use gentle pressure only.    If you are having trouble draining the water out of your ear canal, put a few drops of rubbing alcohol (isopropyl alcohol) into the ear canal. This will help remove the remaining water.    Repeat this procedure once a day for up to three days, or until your hearing is back to normal. Do not use this treatment for more than three days in a row.  Don ts    Don t use cold water to rinse the ear. This will make you dizzy.    Don t perform this procedure if you have an ear infection.    Don t perform this procedure if you have a ruptured eardrum.    Don t use cotton swabs, matches, hairpins, keys, or other objects to  clean  the ear canal. This can cause infection of the ear canal or rupture the eardrum. Because of their size and shape, cotton swabs can push earwax deeper into the ear canal instead of removing it.  Follow-up care  Follow up with your health care provider if you are not improving after three cleaning attempts, or as advised.  When to seek medical advice  Call your health care provider right away if any of these occur:    Worsening ear pain    Fever of 101 F (38.3 C) or higher, or as directed by your health care provider    Hearing does not return to normal after three days of treatment    Fluid drainage or bleeding from the ear canal    Swelling, redness, or tenderness of the outer ear    Headache, neck pain, or stiff neck    6143-2752 [employer]. [address] [place] [address]14. All rights reserved. This information is not intended as a substitute for professional medical care. Always follow your healthcare professional's instructions.

## 2020-07-05 LAB
SPECIMEN SOURCE: NORMAL
STREP GROUP A PCR: NOT DETECTED

## 2020-11-07 ENCOUNTER — VIRTUAL VISIT (OUTPATIENT)
Dept: FAMILY MEDICINE | Facility: OTHER | Age: 65
End: 2020-11-07

## 2020-11-07 ENCOUNTER — HEALTH MAINTENANCE LETTER (OUTPATIENT)
Age: 65
End: 2020-11-07

## 2020-11-07 NOTE — PROGRESS NOTES
"Date: 2020 14:47:32  Clinician: Hattie Villavicencio  Clinician NPI: [id number]  Patient: Nenita Goins  Patient : 1955  Patient Address: [address] [place] [id number]  Patient Phone: [phone]  Visit Protocol: URI  Patient Summary:  Nenita is a 65 year old ( : 1955 ) female who initiated a [facility] Visit for COVID-19 (Coronavirus) evaluation and screening. When asked the question \"Please sign me up to receive news, health information and promotions. \", Nenita responded \"Yes\".    Nenita states her symptoms started 1-2 days ago.   Her symptoms consist of rhinitis, facial pain or pressure, myalgia, chills, malaise, tooth pain, diarrhea, ear pain, wheezing, nasal congestion, and nausea.   Symptom details     Nasal secretions: The color of her mucus is clear.    Wheezing: Nenita has been diagnosed with asthma. Additional wheezing details as reported by the patient (free text): It's an asthmatic wheeze but usually doesn't affect my daily activities       Facial pain or pressure: The facial pain or pressure does not feel worse when bending or leaning forward.     Tooth pain: The tooth pain is not caused by a cavity, recent dental work, or other mouth problems.      Nenita denies having vomiting, sore throat, ageusia, headache, fever, cough, and anosmia. She also denies taking antibiotic medication in the past month and having recent facial or sinus surgery in the past 60 days. She is not experiencing dyspnea.   Precipitating events  She has not recently been exposed to someone with influenza. Nenita has been in close contact with the following high risk individuals: adults 65 or older and people with asthma, heart disease or diabetes.   Pertinent COVID-19 (Coronavirus) information  Nenita does not work or volunteer as healthcare worker or a . In the past 14 days, Nenita has not worked or volunteered at a healthcare facility or group living setting.   In the past 14 days, she also " has not lived in a congregate living setting.   Nenita has not had a close contact with a laboratory-confirmed COVID-19 patient within 14 days of symptom onset.    Since December 2019, Nenita has not been tested for COVID-19 and has not had upper respiratory infection or influenza-like illness.   Pertinent medical history  Nenita does not get yeast infections when she takes antibiotics.   Nenita does not need a return to work/school note.   Weight: 185 lbs   Nenita does not smoke or use smokeless tobacco.   Weight: 185 lbs  Reason for repeat visit for the same protocol within 24 hours:  I didn't finish the last one because I couldn't figure out how to answer some of the questions.  See the History of referred by protocol and completed visits section for details on previous visits (visits currently in queue to be diagnosed will not appear in this section).    MEDICATIONS: No current medications, ALLERGIES: epinephrine  Clinician Response:  Dear Nenita,   Your symptoms show that you may have coronavirus (COVID-19). This illness can cause fever, cough and trouble breathing. Many people get a mild case and get better on their own. Some people can get very sick.  What should I do?  We would like to test you for this virus.   1. Please call [phone] to schedule your visit. Explain that you were [facility] to have a COVID-19 test. Be ready to share your [facility] visit ID number.  * If you need to schedule in [facility] please call [phone] or for [employer] employees please call [phone].  * If you need to schedule in the [place] area please call [phone]. [place] employees call [phone].  The following will serve as your written order for this COVID Test, ordered by me, for the indication of suspected COVID [Z20.828]: The test will be ordered in [employer], our electronic health record, after you are scheduled. It will show as ordered and authorized by Sherman Serna MD.  Order: COVID-19 (Coronavirus) PCR for  "SYMPTOMATIC testing from [facility].   2. When it's time for your COVID test:  Stay at least 6 feet away from others. (If someone will drive you to your test, stay in the backseat, as far away from the  as you can.)   Cover your mouth and nose with a mask, tissue or washcloth.  Go straight to the testing site. Don't make any stops on the way there or back.      3.Starting now: Stay home and away from others (self-isolate) until:   You've had no fever---and no medicine that reduces fever---for one full day (24 hours). And...   Your other symptoms have gotten better. For example, your cough or breathing has improved. And...   At least 10 days have passed since your symptoms started.       During this time, don't leave the house except for testing or medical care.   Stay in your own room, even for meals. Use your own bathroom if you can.   Stay away from others in your home. No hugging, kissing or shaking hands. No visitors.  Don't go to work, school or anywhere else.    Clean \"high touch\" surfaces often (doorknobs, counters, handles, etc.). Use a household cleaning spray or wipes. You'll find a full list of  on the EPA website: www.epa.gov/pesticide-registration/list-n-disinfectants-use-against-sars-cov-2.   Cover your mouth and nose with a mask, tissue or washcloth to avoid spreading germs.  Wash your hands and face often. Use soap and water.  Caregivers in these groups are at risk for severe illness due to COVID-19:  o People 65 years and older  o People who live in a nursing home or long-term care facility  o People with chronic disease (lung, heart, cancer, diabetes, kidney, liver, immunologic)  o People who have a weakened immune system, including those who:   Are in cancer treatment  Take medicine that weakens the immune system, such as corticosteroids  Had a bone marrow or organ transplant  Have an immune deficiency  Have poorly controlled HIV or AIDS  Are obese (body mass index of 40 or higher)  " Smoke regularly   o Caregivers should wear gloves while washing dishes, handling laundry and cleaning bedrooms and bathrooms.  o Use caution when washing and drying laundry: Don't shake dirty laundry, and use the warmest water setting that you can.  o For more tips, go to www.cdc.gov/coronavirus/2019-ncov/downloads/10Things.pdf.    4.Sign up for [employer]. We know it's scary to hear that you might have COVID-19. We want to track your symptoms to make sure you're okay over the next 2 weeks. Please look for an email from [employer]---this is a free, online program that we'll use to keep in touch. To sign up, follow the link in the email. Learn more at [email or web address]  How can I take care of myself?   Get lots of rest. Drink extra fluids (unless a doctor has told you not to).   Take Tylenol (acetaminophen) for fever or pain. If you have liver or kidney problems, ask your family doctor if it's okay to take Tylenol.   Adults can take either:    650 mg (two 325 mg pills) every 4 to 6 hours, or...   1,000 mg (two 500 mg pills) every 8 hours as needed.    Note: Don't take more than 3,000 mg in one day. Acetaminophen is found in many medicines (both prescribed and over-the-counter medicines). Read all labels to be sure you don't take too much.   For children, check the Tylenol bottle for the right dose. The dose is based on the child's age or weight.    If you have other health problems (like cancer, heart failure, an organ transplant or severe kidney disease): Call your specialty clinic if you don't feel better in the next 2 days.       Know when to call 911. Emergency warning signs include:    Trouble breathing or shortness of breath Pain or pressure in the chest that doesn't go away Feeling confused like you haven't felt before, or not being able to wake up Bluish-colored lips or face.  Where can I get more information?    [facility] -- About COVID-19: [email or web address]   CDC --  What to Do If You're Sick: www.cdc.gov/coronavirus/2019-ncov/about/steps-when-sick.html   CDC -- Ending Home Isolation: www.cdc.gov/coronavirus/2019-ncov/hcp/disposition-in-home-patients.html   CDC -- Caring for Someone: www.cdc.gov/coronavirus/2019-ncov/if-you-are-sick/care-for-someone.html   [facility] -- Interim Guidance for Hospital Discharge to Home: www.health.[facility].[place]./diseases/coronavirus/hcp/hospdischarge.pdf   [facility] clinical trials (COVID-19 research studies): clinicalaffairs.[facility]-clinical-trials    Below are the COVID-19 hotlines at the [place] Department of Health ([facility]). Interpreters are available.    For health questions: Call 133-110-1306 or 1-679.251.5434 (7 a.m. to 7 p.m.) For questions about schools and childcare: Call 670-425-5596 or 1-230.751.4182 (7 a.m. to 7 p.m.)    COVID-19 (Coronavirus) General Information  Because there is currently no vaccine to prevent infection, the best way to protect yourself is to avoid being exposed to this virus. Common symptoms of COVID-19 include but are not limited to fever, cough, and shortness of breath. These symptoms appear 2-14 days after you are exposed to the virus that causes COVID-19. Click here for more information from the CDC on how to protect yourself.  If you are sick with COVID-19 or suspect you are infected with the virus that causes COVID-19, follow the steps here from the CDC to help prevent the disease from spreading to people in your home and community.  Click here for general information from the CDC on testing.  If you develop any of these emergency warning signs for COVID-19, get medical attention immediately:     Trouble breathing    Persistent pain or pressure in the chest    New confusion or inability to arouse    Bluish lips or face      Call your doctor or clinic before going in. Call [phone] if you have a medical emergency and notify the  you have or think you may have COVID-19.  For more detailed and up to  date information on COVID-19 (Coronavirus), please visit the CDC website.   Diagnosis: Myalgia, unspecified site  Diagnosis ICD: M79.10

## 2020-11-11 ENCOUNTER — VIRTUAL VISIT (OUTPATIENT)
Dept: FAMILY MEDICINE | Facility: CLINIC | Age: 65
End: 2020-11-11
Payer: COMMERCIAL

## 2020-11-11 DIAGNOSIS — Z20.822 SUSPECTED COVID-19 VIRUS INFECTION: Primary | ICD-10-CM

## 2020-11-11 DIAGNOSIS — Z12.31 ENCOUNTER FOR SCREENING MAMMOGRAM FOR BREAST CANCER: ICD-10-CM

## 2020-11-11 DIAGNOSIS — R11.0 NAUSEA WITHOUT VOMITING: Primary | ICD-10-CM

## 2020-11-11 PROCEDURE — 99213 OFFICE O/P EST LOW 20 MIN: CPT | Mod: 95 | Performed by: FAMILY MEDICINE

## 2020-11-11 NOTE — PATIENT INSTRUCTIONS
Try TUMS and see if that helps.    Continue drinking small amounts of fluids and eating small amounts of bland food frequently throughout the day.    If your COVID-19 test is negative but you continue to have symptoms for over a week send me an update and we'll have you come in to do labs.    If your symptoms worsen, particularly if you get a fever, vomiting, or abdominal pain notify clinic.    Schedule a mammogram when you feel safe coming in for that.  Call [facility] appointment line [phone]

## 2020-11-11 NOTE — PROGRESS NOTES
"Nenita Goins is a 65 year old female who is being evaluated via a billable video visit.      The patient has been notified of following:     \"This video visit will be conducted via a call between you and your physician/provider. We have found that certain health care needs can be provided without the need for an in-person physical exam.  This service lets us provide the care you need with a video conversation.  If a prescription is necessary we can send it directly to your pharmacy.  If lab work is needed we can place an order for that and you can then stop by our lab to have the test done at a later time.    Video visits are billed at different rates depending on your insurance coverage.  Please reach out to your insurance provider with any questions.    If during the course of the call the physician/provider feels a video visit is not appropriate, you will not be charged for this service.\"    Patient has given verbal consent for Video visit? Yes  How would you like to obtain your AVS? Mail a copy  If you are dropped from the video visit, the video invite should be resent to: Send to e-mail at: [email or web address]  Will anyone else be joining your video visit? No      Subjective     Nenita Goins is a 65 year old female who presents today via video visit for the following health issues:    HPI          Abdominal/Flank Problem  Onset/Duration: x 4 days  Description:   Character: nausea and sour feeling, unusual hunger  Intensity: mild  Progression of Symptoms:  same  Accompanying Signs & Symptoms:  Fever/Chills: YES- chills, no fever  Gas/Bloating: YES  Nausea: YES  Vomitting: no  Diarrhea: YES - first day only  Constipation: no  Dysuria or Hematuria: no new symptoms - burning and trouble emptying bladder are longstanding  History:   Trauma: no  Previous similar problem: no  Previous tests done: none  Precipitating factors:   Does the problem change with:     Food: YES- bland foods help stomach from feeling " "worse; fatty foods and seasoned foods worsen    Bowel Movement: no    Urination: no   Other factors:  no  Therapies tried and outcome: None  Patient's last menstrual period was 03/01/2008.      Video Start Time: 9:28 AM    She describes this as a \"sour\" feeling in her stomach.  Feels she has to eat a small amount every couple hours or it gets worse  No belly pain but has some discomfort over lower out rib cage bilaterally.  More joint pain than usual.  No respiratory symptoms.    Symptoms neither improving nor worsening since onset.  No known exposure to anyone with similar symptoms  She has been in quarantine since onset of symptoms and has been very careful about maintaining social distance during COVID-19 pandemic.    Has COVID-19 test schedule for tomorrow at drive-through site ([facility]         Review of Systems   Constitutional, HEENT, respiratory, gi and gu systems are negative, except as otherwise noted.      Objective    Vitals - Patient Reported  Weight (Patient Reported): 83.9 kg (185 lb)  Height (Patient Reported): 165.1 cm (5' 5\")  BMI (Based on Pt Reported Ht/Wt): 30.79      Vitals:  No vitals were obtained today due to virtual visit.    Physical Exam     GENERAL: Healthy, alert and no distress  EYES: Eyes grossly normal to inspection.  No discharge or erythema, or obvious scleral/conjunctival abnormalities.  RESP: No audible wheeze, cough, or visible cyanosis.  No visible retractions or increased work of breathing.    SKIN: Visible skin clear. No significant rash, abnormal pigmentation or lesions.  NEURO: Cranial nerves grossly intact.  Mentation and speech appropriate for age.  PSYCH: Mentation appears normal, affect normal/bright, judgement and insight intact, normal speech and appearance well-groomed.            Assessment & Plan     Nausea without vomiting  Unclear etiology/diagnosis with broad differential. Agree with plan to test for COVID-19 given community prevalence; she is scheduled to have " that done tomorrow.  As she has no abdominal pain or fever, this is most likely a self-limited illness and I encouraged her to treat symptomatically with small amounts of fluid and bland food frequently.  Try TUMS to see if that helps at all.  I offered her zofran but she declined - nausea isn't that bad.  Reviewed alarm symptoms that should prompt her to notify us.  If her symptoms persist into next week she'll send me a MyChart update and we'll consider getting labs and/or having her come in for physical exam.      Encounter for screening mammogram for breast cancer  - MA Screening Digital Bilateral       Patient Instructions   Try TUMS and see if that helps.    Continue drinking small amounts of fluids and eating small amounts of bland food frequently throughout the day.    If your COVID-19 test is negative but you continue to have symptoms for over a week send me an update and we'll have you come in to do labs.    If your symptoms worsen, particularly if you get a fever, vomiting, or abdominal pain notify clinic.    Schedule a mammogram when you feel safe coming in for that.  Call [facility] appointment line [phone]      No follow-ups on file.    Perla Garsia MD  [facility]      Video-Visit Details    Type of service:  Video Visit    Video End Time:9:42 AM    Originating Location (pt. Location): Home    Distant Location (provider location):  [facility]     Platform used for Video Visit: [employer]

## 2020-11-12 DIAGNOSIS — Z20.822 SUSPECTED COVID-19 VIRUS INFECTION: ICD-10-CM

## 2020-11-12 PROCEDURE — U0003 INFECTIOUS AGENT DETECTION BY NUCLEIC ACID (DNA OR RNA); SEVERE ACUTE RESPIRATORY SYNDROME CORONAVIRUS 2 (SARS-COV-2) (CORONAVIRUS DISEASE [COVID-19]), AMPLIFIED PROBE TECHNIQUE, MAKING USE OF HIGH THROUGHPUT TECHNOLOGIES AS DESCRIBED BY CMS-2020-01-R: HCPCS | Performed by: FAMILY MEDICINE

## 2020-11-13 LAB
SARS-COV-2 RNA SPEC QL NAA+PROBE: NOT DETECTED
SPECIMEN SOURCE: NORMAL

## 2020-11-24 ENCOUNTER — VIRTUAL VISIT (OUTPATIENT)
Dept: FAMILY MEDICINE | Facility: CLINIC | Age: 65
End: 2020-11-24
Payer: COMMERCIAL

## 2020-11-24 DIAGNOSIS — R19.7 ACUTE DIARRHEA: Primary | ICD-10-CM

## 2020-11-24 PROCEDURE — 99213 OFFICE O/P EST LOW 20 MIN: CPT | Mod: 95 | Performed by: FAMILY MEDICINE

## 2020-11-24 NOTE — PROGRESS NOTES
"Nenita Goins is a 65 year old female who is being evaluated via a billable video visit.      The patient has been notified of following:     \"This video visit will be conducted via a call between you and your physician/provider. We have found that certain health care needs can be provided without the need for an in-person physical exam.  This service lets us provide the care you need with a video conversation.  If a prescription is necessary we can send it directly to your pharmacy.  If lab work is needed we can place an order for that and you can then stop by our lab to have the test done at a later time.    Video visits are billed at different rates depending on your insurance coverage.  Please reach out to your insurance provider with any questions.    If during the course of the call the physician/provider feels a video visit is not appropriate, you will not be charged for this service.\"    Patient has given verbal consent for Video visit? Yes  How would you like to obtain your AVS? MyChart  If you are dropped from the video visit, the video invite should be resent to: Text to cell phone: [phone]  Will anyone else be joining your video visit? No    Subjective     Nenita Goins is a 65 year old female who presents today via video visit for the following health issues:    HPI     Diarrhea  Onset/Duration: sporadic 2.5 weeks   Description:       Consistency of stool: loose       Blood in stool: no       Number of loose stools past 24 hours: 0  Progression of Symptoms: improving  Accompanying signs and symptoms:       Fever: no       Nausea/Vomiting: YES- nausea       Abdominal pain: YES- below sternum and lower abd.        Weight loss: no       Episodes of constipation: not now   History   Ill contacts: no  Recent use of antibiotics: no  Recent travels: no  Recent medication-new or changes(Rx or OTC): no  Precipitating or alleviating factors: None  Therapies tried and outcome: changing diet      Video " "Start Time: 1:07 PM    I previously saw her for this illness at the beginning - on 11/11/2020, when she was 4 days into illness.  Since then she has tested negative for COVID.  Last week she felt her symptoms were not improving so she scheduled this follow-up but more recently is starting to feel better.    She notes the sour taste in her mouth/stomach has resolved.    Chills have resolved but she does have some sweats.  No fever.  She had a couple episodes of stool urgency with explosive diarrhea last week.  She's been eating a bland diet and drinking fluids and that has gone well.  Some dysuria.    Onset of symptoms after eating [employer] take-out.  Next night ate a chicken bratwurst sausage with hot peppers      Review of Systems   Constitutional, gi and gu systems are negative, except as otherwise noted.      Objective    Vitals - Patient Reported  Weight (Patient Reported): 83.9 kg (185 lb)  Height (Patient Reported): 165.1 cm (5' 5\")  BMI (Based on Pt Reported Ht/Wt): 30.79      Physical Exam     GENERAL: Healthy, alert and no distress  EYES: Eyes grossly normal to inspection.  No discharge or erythema, or obvious scleral/conjunctival abnormalities.  RESP: No audible wheeze, cough, or visible cyanosis.  No visible retractions or increased work of breathing.    SKIN: Visible skin clear. No significant rash, abnormal pigmentation or lesions.  NEURO: Cranial nerves grossly intact.  Mentation and speech appropriate for age.  PSYCH: Mentation appears normal, affect normal/bright, judgement and insight intact, normal speech and appearance well-groomed.      Orders Only on 11/12/2020   Component Date Value Ref Range Status     COVID-19 Virus PCR to [facility] - So* 11/12/2020 Nasopharyngeal   Final     COVID-19 Virus PCR to [facility] - Re* 11/12/2020 Not Detected   Final            Assessment & Plan     Acute diarrhea  Still most likely acute gastroenteritis although a somewhat more prolonged course than typical.  As she is " starting to feel better in the past few days I think its safe for her to continue eating a bland diet and monitoring her symptoms.  I expect they will gradually improve and she will be able to advance her diet.  If symptoms persist or worsen we'll start lab workup for persistent diarrhea.  I placed the orders for that so she can just schedule a lab-only appointment if needed.  - **UA reflex to Microscopic FUTURE anytime  - CBC with platelets differential  - Comprehensive metabolic panel  - Erythrocyte sedimentation rate auto  - Occult blood fecal HGB immuno  - TSH with free T4 reflex  - Clostridium difficile Toxin B PCR           Patient Instructions   Continue to monitor your symptoms and eat a bland diet with plenty of fluids (frequent fluids in small amounts).    I think your symptoms will continue to gradually improve and as they do you can advance/expand your diet.    If your symptoms persist or worsen schedule a lab-only appointment and we'll check blood tests, stool tests, and a urinalysis.      Did you know?   I do telehealth (video) visits exclusively on Tuesdays and Wednesdays.  I still do in-person visits at [facility] on Mondays and Thursdays.  You can schedule a video visit for follow-up appointments as well as future appointments for certain conditions.  Please see the below link.  [email or web address]    If you have not already done so,  I encourage you to sign up for Mychart ([email or web address]).  This will allow you to review your results, securely communicate with a provider, and schedule virtual visits as well.    If you are due for a mammogram or colonoscopy please call the [facility] scheduling number: [phone].       Return in about 2 months (around 1/24/2021) for routine preventive care, with me (Dr. Garsia), in person.    Perla Garsia MD  [facility]  [facility]      Video-Visit Details    Type of service:  Video Visit    Video End Time:1:23 PM    Originating Location (pt. Location): Home    Distant Location (provider location):  [facility]     Platform used for Video Visit: [employer]

## 2020-11-24 NOTE — PATIENT INSTRUCTIONS
Continue to monitor your symptoms and eat a bland diet with plenty of fluids (frequent fluids in small amounts).    I think your symptoms will continue to gradually improve and as they do you can advance/expand your diet.    If your symptoms persist or worsen schedule a lab-only appointment and we'll check blood tests, stool tests, and a urinalysis.      Did you know?   I do telehealth (video) visits exclusively on Tuesdays and Wednesdays.  I still do in-person visits at [facility] on Mondays and Thursdays.  You can schedule a video visit for follow-up appointments as well as future appointments for certain conditions.  Please see the below link.  [email or web address]    If you have not already done so,  I encourage you to sign up for [employer] ([email or web address]).  This will allow you to review your results, securely communicate with a provider, and schedule virtual visits as well.    If you are due for a mammogram or colonoscopy please call the [facility] scheduling number: [phone].

## 2020-11-30 ENCOUNTER — MYC MEDICAL ADVICE (OUTPATIENT)
Dept: FAMILY MEDICINE | Facility: CLINIC | Age: 65
End: 2020-11-30

## 2020-11-30 NOTE — TELEPHONE ENCOUNTER
Dr. Garsia-Please review patient's messages and advise if you recommend an in-person visit for further evaluation?      Writer responded via [employer].    Thank you!  EDUIN CardonaN, RN

## 2020-12-01 DIAGNOSIS — R19.7 ACUTE DIARRHEA: ICD-10-CM

## 2020-12-01 LAB
ALBUMIN UR-MCNC: NEGATIVE MG/DL
APPEARANCE UR: CLEAR
BACTERIA #/AREA URNS HPF: ABNORMAL /HPF
BASOPHILS # BLD AUTO: 0 10E9/L (ref 0–0.2)
BASOPHILS NFR BLD AUTO: 0.3 %
BILIRUB UR QL STRIP: NEGATIVE
COLOR UR AUTO: YELLOW
DIFFERENTIAL METHOD BLD: NORMAL
EOSINOPHIL # BLD AUTO: 0.2 10E9/L (ref 0–0.7)
EOSINOPHIL NFR BLD AUTO: 3.5 %
ERYTHROCYTE [DISTWIDTH] IN BLOOD BY AUTOMATED COUNT: 11.8 % (ref 10–15)
ERYTHROCYTE [SEDIMENTATION RATE] IN BLOOD BY WESTERGREN METHOD: 9 MM/H (ref 0–30)
GLUCOSE UR STRIP-MCNC: NEGATIVE MG/DL
HCT VFR BLD AUTO: 41.8 % (ref 35–47)
HGB BLD-MCNC: 13.9 G/DL (ref 11.7–15.7)
HGB UR QL STRIP: NEGATIVE
KETONES UR STRIP-MCNC: NEGATIVE MG/DL
LEUKOCYTE ESTERASE UR QL STRIP: ABNORMAL
LYMPHOCYTES # BLD AUTO: 2.1 10E9/L (ref 0.8–5.3)
LYMPHOCYTES NFR BLD AUTO: 31 %
MCH RBC QN AUTO: 31.7 PG (ref 26.5–33)
MCHC RBC AUTO-ENTMCNC: 33.3 G/DL (ref 31.5–36.5)
MCV RBC AUTO: 95 FL (ref 78–100)
MONOCYTES # BLD AUTO: 0.5 10E9/L (ref 0–1.3)
MONOCYTES NFR BLD AUTO: 7.2 %
NEUTROPHILS # BLD AUTO: 3.9 10E9/L (ref 1.6–8.3)
NEUTROPHILS NFR BLD AUTO: 58 %
NITRATE UR QL: NEGATIVE
NON-SQ EPI CELLS #/AREA URNS LPF: ABNORMAL /LPF
PH UR STRIP: 5.5 PH (ref 5–7)
PLATELET # BLD AUTO: 254 10E9/L (ref 150–450)
RBC # BLD AUTO: 4.38 10E12/L (ref 3.8–5.2)
RBC #/AREA URNS AUTO: ABNORMAL /HPF
SOURCE: ABNORMAL
SP GR UR STRIP: 1.02 (ref 1–1.03)
UROBILINOGEN UR STRIP-ACNC: 0.2 EU/DL (ref 0.2–1)
WBC # BLD AUTO: 6.8 10E9/L (ref 4–11)
WBC #/AREA URNS AUTO: ABNORMAL /HPF

## 2020-12-01 PROCEDURE — 36415 COLL VENOUS BLD VENIPUNCTURE: CPT | Performed by: FAMILY MEDICINE

## 2020-12-01 PROCEDURE — 85652 RBC SED RATE AUTOMATED: CPT | Performed by: FAMILY MEDICINE

## 2020-12-01 PROCEDURE — 80050 GENERAL HEALTH PANEL: CPT | Performed by: FAMILY MEDICINE

## 2020-12-01 PROCEDURE — 81001 URINALYSIS AUTO W/SCOPE: CPT | Performed by: FAMILY MEDICINE

## 2020-12-01 NOTE — TELEPHONE ENCOUNTER
mychart communication. Lab appt. Scheduled and message sent to Perla Garsia MD in regards to patient back and pelvic pain.    Thanks! Angie Coker RN

## 2020-12-02 LAB
ALBUMIN SERPL-MCNC: 3.8 G/DL (ref 3.4–5)
ALP SERPL-CCNC: 58 U/L (ref 40–150)
ALT SERPL W P-5'-P-CCNC: 46 U/L (ref 0–50)
ANION GAP SERPL CALCULATED.3IONS-SCNC: 4 MMOL/L (ref 3–14)
AST SERPL W P-5'-P-CCNC: 24 U/L (ref 0–45)
BILIRUB SERPL-MCNC: 0.4 MG/DL (ref 0.2–1.3)
BUN SERPL-MCNC: 18 MG/DL (ref 7–30)
CALCIUM SERPL-MCNC: 9 MG/DL (ref 8.5–10.1)
CHLORIDE SERPL-SCNC: 109 MMOL/L (ref 94–109)
CO2 SERPL-SCNC: 26 MMOL/L (ref 20–32)
CREAT SERPL-MCNC: 0.87 MG/DL (ref 0.52–1.04)
GFR SERPL CREATININE-BSD FRML MDRD: 70 ML/MIN/{1.73_M2}
GLUCOSE SERPL-MCNC: 102 MG/DL (ref 70–99)
POTASSIUM SERPL-SCNC: 4.1 MMOL/L (ref 3.4–5.3)
PROT SERPL-MCNC: 7.1 G/DL (ref 6.8–8.8)
SODIUM SERPL-SCNC: 139 MMOL/L (ref 133–144)
TSH SERPL DL<=0.005 MIU/L-ACNC: 1.93 MU/L (ref 0.4–4)

## 2020-12-03 NOTE — RESULT ENCOUNTER NOTE
Juan Gutierres,  Your labs were within acceptable limits.  This includes blood counts, urinalysis, ESR (sed rate - a test of inflammation), TSH (thyroid function test), and comprehensive metabolic panel results (liver function, kidney function, blood sugar, and blood salts).  Minor highs/lows were noted which are not clinically significant.       If the diarrhea persists we should consider having your see GI.  Let me know.    Perla Garsia MD

## 2021-01-21 DIAGNOSIS — M25.552 PAIN OF BOTH HIP JOINTS: ICD-10-CM

## 2021-01-21 DIAGNOSIS — M25.551 PAIN OF BOTH HIP JOINTS: ICD-10-CM

## 2021-01-21 NOTE — TELEPHONE ENCOUNTER
Dr. Garsia-Please review, sign if agree and may close encounter.    Writer called [facility] and spoke with Juan David who stated no alternative is requested, just a refill of Ibuprofen capsules.    Routing refill request to provider for review/approval because:  Drug not on the [facility] refill protocol     Last refill: 3/3/20, #50, 1 refill    Creatinine   Date Value Ref Range Status   12/01/2020 0.87 0.52 - 1.04 mg/dL Final     GFR Estimate   Date Value Ref Range Status   12/01/2020 70 >60 mL/min/[1.73_m2] Final     Comment:     Non  GFR Calc  Starting 12/18/2018, serum creatinine based estimated GFR (eGFR) will be   calculated using the Chronic Kidney Disease Epidemiology Collaboration   (CKD-EPI) equation.     10/31/2018 72 >60 mL/min/1.7m2 Final     Comment:     Non  GFR Calc   01/09/2016 74 >60 mL/min/1.7m2 Final     Comment:     Non  GFR Calc     GFR Estimate If Black   Date Value Ref Range Status   12/01/2020 82 >60 mL/min/[1.73_m2] Final     Comment:      GFR Calc  Starting 12/18/2018, serum creatinine based estimated GFR (eGFR) will be   calculated using the Chronic Kidney Disease Epidemiology Collaboration   (CKD-EPI) equation.     10/31/2018 87 >60 mL/min/1.7m2 Final     Comment:      GFR Calc   01/09/2016 90 >60 mL/min/1.7m2 Final     Comment:      GFR Calc         Thank you!  EDUIN CardonaN, RN  [facility]

## 2021-01-21 NOTE — TELEPHONE ENCOUNTER
COMPOUNDED NON-CONTROLLED SUBSTANCE (CMPD RX) - PHARMACY TO MIX COMPOUNDED MEDICATION        Last Written Prescription Date:  3-3-20  Last Fill Quantity: 50 tab,   # refills: 1  Last Office Visit: 11-24-20  Future Office visit:       Routing refill request to provider for review/approval because:    An alternative was required to select this med.

## 2021-01-26 ENCOUNTER — MYC MEDICAL ADVICE (OUTPATIENT)
Dept: FAMILY MEDICINE | Facility: CLINIC | Age: 66
End: 2021-01-26

## 2021-02-16 ENCOUNTER — MYC MEDICAL ADVICE (OUTPATIENT)
Dept: FAMILY MEDICINE | Facility: CLINIC | Age: 66
End: 2021-02-16

## 2021-02-16 DIAGNOSIS — E78.5 DYSLIPIDEMIA (HIGH LDL; LOW HDL): Primary | ICD-10-CM

## 2021-02-16 DIAGNOSIS — M25.552 PAIN OF BOTH HIP JOINTS: ICD-10-CM

## 2021-02-16 DIAGNOSIS — M25.551 PAIN OF BOTH HIP JOINTS: ICD-10-CM

## 2021-02-16 NOTE — TELEPHONE ENCOUNTER
Dr. Garsia-Please review and advise:  1. Can increased dispense quantity be ordered for Ibuprofen capsules?   A. Med pended with dispense of #100, 1 refill  2. Do you recommend patient complete labs prior to annual visit, or complete labs while in clinic for office visit?   A. Lipid panel pended per Health Maintenance review  3. Patient can be informed the CDC recommends spacing all other vaccines at least 14 days from COVID-19 vaccination    Thank you!  EDUIN CardonaN, RN  [facility]

## 2021-02-17 NOTE — TELEPHONE ENCOUNTER
"Agree with her priorities: mammo, pap smear with [facility].      Agree with holding off on TdaP until she gets COVID vaccine.      I signed for #100 ibuprofen - thanks for loading that.  If she'd like to see PT (after she gets COVID vaccine) I can refer her for that.    I recommend that she schedule a \"Welcome to Medicare\" preventive visit this summer (after she gets registered with medicare).  If she'd like to check a fasting lipid panel before then I did place the future order.    Perla Garsia MD   "

## 2021-02-22 ENCOUNTER — ANCILLARY PROCEDURE (OUTPATIENT)
Dept: MAMMOGRAPHY | Facility: CLINIC | Age: 66
End: 2021-02-22
Attending: FAMILY MEDICINE
Payer: COMMERCIAL

## 2021-02-22 ENCOUNTER — OFFICE VISIT (OUTPATIENT)
Dept: OBGYN | Facility: CLINIC | Age: 66
End: 2021-02-22
Payer: COMMERCIAL

## 2021-02-22 VITALS
SYSTOLIC BLOOD PRESSURE: 123 MMHG | HEART RATE: 66 BPM | BODY MASS INDEX: 31 KG/M2 | OXYGEN SATURATION: 95 % | WEIGHT: 182 LBS | DIASTOLIC BLOOD PRESSURE: 77 MMHG

## 2021-02-22 DIAGNOSIS — D25.9 UTERINE LEIOMYOMA, UNSPECIFIED LOCATION: ICD-10-CM

## 2021-02-22 DIAGNOSIS — Z12.4 SCREENING FOR MALIGNANT NEOPLASM OF CERVIX: Primary | ICD-10-CM

## 2021-02-22 DIAGNOSIS — Z12.31 ENCOUNTER FOR SCREENING MAMMOGRAM FOR BREAST CANCER: ICD-10-CM

## 2021-02-22 PROCEDURE — 99202 OFFICE O/P NEW SF 15 MIN: CPT | Performed by: OBSTETRICS & GYNECOLOGY

## 2021-02-22 PROCEDURE — 87624 HPV HI-RISK TYP POOLED RSLT: CPT | Performed by: OBSTETRICS & GYNECOLOGY

## 2021-02-22 PROCEDURE — 77067 SCR MAMMO BI INCL CAD: CPT

## 2021-02-22 PROCEDURE — G0145 SCR C/V CYTO,THINLAYER,RESCR: HCPCS | Performed by: OBSTETRICS & GYNECOLOGY

## 2021-02-22 PROCEDURE — G0124 SCREEN C/V THIN LAYER BY MD: HCPCS | Performed by: PATHOLOGY

## 2021-02-22 PROCEDURE — 77067 SCR MAMMO BI INCL CAD: CPT | Mod: 26 | Performed by: RADIOLOGY

## 2021-02-24 NOTE — PROGRESS NOTES
Nenita Goins is a 65 year old year old woman who presents for Pap smear, reassessment of known fibroid uterus.  She feels well, with no vaginal bleeding.  Pap 12/13/2017 was NIL, HPV negative.  Pelvic examination that day had suggested a 14-week size fibroid uterus, evaluation in the prior to that suggested an 18-week size fibroid uterus.  We have used the narrow adolescent speculum in the past to visualize her cervix, she has tolerated this well.       Past medical, surgical, family, and social history have been noted.        OBJECTIVE: Healthy female in NAD.  /77   Pulse 66   Wt 82.6 kg (182 lb)   LMP 03/01/2008   SpO2 95%   BMI 31.00 kg/m       Abdomen soft, non-tender.      Pelvic exam:  External genitalia appeared normal without lesion.  Urethral meatus, perineum, and anus appeared normal. Cervix and vagina appeared normal, without lesion.  Pap was sent.  Bimanual examination showed an irregular uterus, consistent with approximately 14 weeks size.    ASSESSMENT: Fibroid uterus, stable, asymptomatic.  Screening for cervical cancer.    PLAN:  When Pap results are known will reassess per guidelines.  She will continue to monitor the fibroids clinically, call as needed.

## 2021-02-25 LAB
COPATH REPORT: ABNORMAL
PAP: ABNORMAL

## 2021-02-26 LAB
FINAL DIAGNOSIS: NORMAL
HPV HR 12 DNA CVX QL NAA+PROBE: NEGATIVE
HPV16 DNA SPEC QL NAA+PROBE: NEGATIVE
HPV18 DNA SPEC QL NAA+PROBE: NEGATIVE
SPECIMEN DESCRIPTION: NORMAL
SPECIMEN SOURCE CVX/VAG CYTO: NORMAL

## 2021-03-01 PROBLEM — R87.610 ASCUS OF CERVIX WITH NEGATIVE HIGH RISK HPV: Status: ACTIVE | Noted: 2021-02-22

## 2021-03-08 ENCOUNTER — MYC MEDICAL ADVICE (OUTPATIENT)
Dept: FAMILY MEDICINE | Facility: CLINIC | Age: 66
End: 2021-03-08

## 2021-03-10 ENCOUNTER — VIRTUAL VISIT (OUTPATIENT)
Dept: FAMILY MEDICINE | Facility: CLINIC | Age: 66
End: 2021-03-10
Payer: COMMERCIAL

## 2021-03-10 DIAGNOSIS — T88.1XXA POSTIMMUNIZATION REACTION, INITIAL ENCOUNTER: ICD-10-CM

## 2021-03-10 DIAGNOSIS — R53.83 OTHER FATIGUE: Primary | ICD-10-CM

## 2021-03-10 PROCEDURE — 99212 OFFICE O/P EST SF 10 MIN: CPT | Mod: 95 | Performed by: FAMILY MEDICINE

## 2021-03-10 RX ORDER — ASCORBIC ACID 1000 MG
TABLET ORAL
COMMUNITY

## 2021-03-10 ASSESSMENT — PATIENT HEALTH QUESTIONNAIRE - PHQ9
SUM OF ALL RESPONSES TO PHQ QUESTIONS 1-9: 6
SUM OF ALL RESPONSES TO PHQ QUESTIONS 1-9: 6
10. IF YOU CHECKED OFF ANY PROBLEMS, HOW DIFFICULT HAVE THESE PROBLEMS MADE IT FOR YOU TO DO YOUR WORK, TAKE CARE OF THINGS AT HOME, OR GET ALONG WITH OTHER PEOPLE: SOMEWHAT DIFFICULT

## 2021-03-10 ASSESSMENT — ANXIETY QUESTIONNAIRES
GAD7 TOTAL SCORE: 4
GAD7 TOTAL SCORE: 4
6. BECOMING EASILY ANNOYED OR IRRITABLE: SEVERAL DAYS
5. BEING SO RESTLESS THAT IT IS HARD TO SIT STILL: NOT AT ALL
1. FEELING NERVOUS, ANXIOUS, OR ON EDGE: SEVERAL DAYS
2. NOT BEING ABLE TO STOP OR CONTROL WORRYING: NOT AT ALL
GAD7 TOTAL SCORE: 4
7. FEELING AFRAID AS IF SOMETHING AWFUL MIGHT HAPPEN: SEVERAL DAYS
3. WORRYING TOO MUCH ABOUT DIFFERENT THINGS: SEVERAL DAYS
7. FEELING AFRAID AS IF SOMETHING AWFUL MIGHT HAPPEN: SEVERAL DAYS
4. TROUBLE RELAXING: NOT AT ALL

## 2021-03-10 NOTE — PROGRESS NOTES
"Nenita is a 65 year old who is being evaluated via a billable telephone visit.      What phone number would you like to be contacted at? [phone]  How would you like to obtain your AVS? MyChart    Assessment & Plan     Other fatigue  Postimmunization reaction, initial encounter  Discussed that fatigue (as well as other flu-like symptoms) is a known side effect of the COVID-19 vaccine.  However, the duration of her fatigue is more prolonged than expected.  As her fatigue is improving and as she has completed both doses of the vaccine series I recommended rest, healthy diet and monitoring of symptoms.         No follow-ups on file.    Perla Garsia MD  [facility]            Gabbie Gutierres is a 65 year old who presents for the following health issues     HPI     Patient states when she got her first covid vaccine 02/3/2021 she had flu-like symptoms with no fever or congestion - just feeling tired. She then got her second dose on 03/03/2021 and is still feeling tired. Took ibuprofen when she got her first dose and it helped with the pain.       Profound fatigue. Feels \"like mono\"  She had some fatigue after the first shot which improved gradually - but she never got back to baseline.  Then after second shot last week the fatigue worsened and has persisted.   Both physical and mental fatigue.    Gradually improving.    Review of Systems         Objective           Vitals:  No vitals were obtained today due to virtual visit.    Physical Exam   GEN:  no apparent distress  PSYCH: Alert and engaged; coherent speech with normal rate and volume; able to articulate logical thoughts, no tangential thoughts, no apparent hallucinations or delusions; affect is normal  RESP: No cough, no audible wheezing, able to talk in full sentences  Remainder of exam unable to be completed due to telephone visits        Phone call duration: 12 minutes  11:03 AM   11:15 AM     Answers for HPI/ROS submitted " by the patient on 3/10/2021   If you checked off any problems, how difficult have these problems made it for you to do your work, take care of things at home, or get along with other people?: Somewhat difficult  PHQ9 TOTAL SCORE: 6  THOMAS 7 TOTAL SCORE: 4

## 2021-03-11 ASSESSMENT — PATIENT HEALTH QUESTIONNAIRE - PHQ9: SUM OF ALL RESPONSES TO PHQ QUESTIONS 1-9: 6

## 2021-03-11 ASSESSMENT — ANXIETY QUESTIONNAIRES: GAD7 TOTAL SCORE: 4

## 2021-03-15 ENCOUNTER — MYC MEDICAL ADVICE (OUTPATIENT)
Dept: FAMILY MEDICINE | Facility: CLINIC | Age: 66
End: 2021-03-15

## 2021-03-15 ENCOUNTER — TELEPHONE (OUTPATIENT)
Dept: OBGYN | Facility: CLINIC | Age: 66
End: 2021-03-15

## 2021-03-15 NOTE — TELEPHONE ENCOUNTER
Dr Garsia - Would you want patient to make an in person appointment so you could evaluate and discuss options?  Please advise.  Miesha Carvalho RN  [facility]

## 2021-03-15 NOTE — TELEPHONE ENCOUNTER
Pt called and left a message on pap RN 's phone wanting more info on her pap smear.     Called pt and discussed pap results. Pt had ASCUS pap with Neg HPV.Discusssed these results with pt and recommendation for repeat cotest in 3 years. Pt said that Dr Tovar had told her at her OV that she could discontinue paps after current cotest. We discussed ASCCP guidelines and pt aware that recommendation with her current cotest is a repeat cotest in 3  Years.    All questions answered to pt satisfaction today and pt will call back with any other questions.    Gerardo Mon, EDUINN, RN   Pap Tracking Nurse

## 2021-03-16 NOTE — TELEPHONE ENCOUNTER
Writer responded via [employer].  EDUIN CardonaN, RN  [facility]

## 2021-03-21 NOTE — PROGRESS NOTES
Assessment & Plan       ICD-10-CM    1. Fatigue, unspecified type  R53.83 TSH with free T4 reflex     CBC with platelets and differential     Comprehensive metabolic panel (BMP + Alb, Alk Phos, ALT, AST, Total. Bili, TP)   2. Cough  R05 XR Chest 2 Views     CBC with platelets and differential     Comprehensive metabolic panel (BMP + Alb, Alk Phos, ALT, AST, Total. Bili, TP)   3. Shortness of breath  R06.02 XR Chest 2 Views     CBC with platelets and differential     Comprehensive metabolic panel (BMP + Alb, Alk Phos, ALT, AST, Total. Bili, TP)   4. Gastroesophageal reflux disease, unspecified whether esophagitis present  K21.9       She has an array of symptoms, most of which preceded the COVID-19 vaccine.  The fatigue she developed following the COVID-19 vaccine is improving gradually which is reassuring.  However, I'd like to do additional with for fatigue as above.    As for the other symptoms I do suspect silent GERD given her nocturnal/recumbant symptoms, morning cough, change in vocal quality and throat symptoms.  We discussed a trial of H2 blocker which she will consider.  I also discussed non-medication approaches to managing GERD: avoid dietary triggers including coffee, alcohol, tomatoes, red sauces, mint, and chocolate, avoid eating late in the evening, eat smaller meals, and elevate the head of the bed (not by propping up head with pillows).  If throat symptoms persist I would consider ENT referral.  I would also consider sleep referral and we discussed how SHONNA can worsen nocturnal GERD.      See Patient Instructions    No follow-ups on file.    Perla Garsia MD  [facility]    Gabbie Gutierres is a 65 year old who presents for the following health issues     HPI     Follow-Up Fatigue  Patient has had profound fatigue since her second COVID vaccination on 3/3/2021.    We discussed this via a telephone visit on 3/10/2021 at which time she described her fatigue as  "feeling \"like mono\" and that it consisted of both mental and physical fatigue.  However, she felt that overall the fatigue was improving very gradually.      However, last week she contacted clinic to report that she was not seeing much improvement in her energy and that her throat felt rough, constricted and easily irritated.  She had a hard time producing a clear tone while singing. These symptoms preceded her COVID vaccination.    Some chest pressure \"feels like a balloon\" in her chest, especially when she lies down.  Dry cough in the morning  Occasional reflux.  Bed partner has not reported heavy snoring or witnessed apnea.    These symptoms also preceded her COVID vaccination.    History of asthma  Concerned about air quality    She has also noted occasional shortness of breath upon awakening in the morning and pain in her rib cage.    Also tinnitus and palpitations when she awakens in the morning - longstanding.  She does have some hearing loss.    Worsening of eczema.  Injection site is still sore and itchy.    Regarding her fatigue, today she reports more energy, especially in the afternoon  Still very lethargic in the morning    Review of Systems   as above       Objective    /78 (BP Location: Right arm, Patient Position: Sitting, Cuff Size: Adult Regular)   Pulse 66   Temp 99  F (37.2  C) (Tympanic)   Wt 83.5 kg (184 lb 1.6 oz)   LMP 03/01/2008   SpO2 98%   BMI 31.36 kg/m    Body mass index is 31.36 kg/m .  Physical Exam   GEN:  no apparent distress  EYES: sclerae and conjunctivae clear with no discharge  ENT: external ears and nose without lesions or scars, TM's and canals clear bilaterally   NECK:  Supple without adenopathy, mass, or thyromegaly  LUNGS:  normal respiratory effort, and lungs clear to auscultation bilaterally - no rales, rhonchi or wheezes  CV: regular rate and rhythm, normal S1 S2, no S3 or S4 and no murmur, click or rub   CHEST:  no tenderness to palpation over costo-chondral " joints or with compression of rib cage.

## 2021-03-21 NOTE — PATIENT INSTRUCTIONS
Consider trial of H2 blocker such as Tagamet or Pepcid.  These are available over-the-counter and if you do that I would recommend a 3-4 week trial.  Those can be taken once daily (at bedtime) or twice daily.      Consider a consultation with sleep doctor to discuss possible sleep apnea.  Let me know and I will refer you.

## 2021-03-22 ENCOUNTER — ANCILLARY PROCEDURE (OUTPATIENT)
Dept: GENERAL RADIOLOGY | Facility: CLINIC | Age: 66
End: 2021-03-22
Attending: FAMILY MEDICINE
Payer: COMMERCIAL

## 2021-03-22 ENCOUNTER — OFFICE VISIT (OUTPATIENT)
Dept: FAMILY MEDICINE | Facility: CLINIC | Age: 66
End: 2021-03-22
Payer: COMMERCIAL

## 2021-03-22 VITALS
SYSTOLIC BLOOD PRESSURE: 134 MMHG | TEMPERATURE: 99 F | OXYGEN SATURATION: 98 % | WEIGHT: 184.1 LBS | HEART RATE: 66 BPM | BODY MASS INDEX: 31.36 KG/M2 | DIASTOLIC BLOOD PRESSURE: 78 MMHG

## 2021-03-22 DIAGNOSIS — K21.9 GASTROESOPHAGEAL REFLUX DISEASE, UNSPECIFIED WHETHER ESOPHAGITIS PRESENT: ICD-10-CM

## 2021-03-22 DIAGNOSIS — R06.02 SHORTNESS OF BREATH: ICD-10-CM

## 2021-03-22 DIAGNOSIS — R53.83 FATIGUE, UNSPECIFIED TYPE: Primary | ICD-10-CM

## 2021-03-22 DIAGNOSIS — R05.9 COUGH: ICD-10-CM

## 2021-03-22 LAB
BASOPHILS # BLD AUTO: 0 10E9/L (ref 0–0.2)
BASOPHILS NFR BLD AUTO: 0.5 %
DIFFERENTIAL METHOD BLD: NORMAL
EOSINOPHIL # BLD AUTO: 0.2 10E9/L (ref 0–0.7)
EOSINOPHIL NFR BLD AUTO: 4 %
ERYTHROCYTE [DISTWIDTH] IN BLOOD BY AUTOMATED COUNT: 11.7 % (ref 10–15)
HCT VFR BLD AUTO: 43.6 % (ref 35–47)
HGB BLD-MCNC: 14.9 G/DL (ref 11.7–15.7)
LYMPHOCYTES # BLD AUTO: 1.8 10E9/L (ref 0.8–5.3)
LYMPHOCYTES NFR BLD AUTO: 29.2 %
MCH RBC QN AUTO: 31.4 PG (ref 26.5–33)
MCHC RBC AUTO-ENTMCNC: 34.2 G/DL (ref 31.5–36.5)
MCV RBC AUTO: 92 FL (ref 78–100)
MONOCYTES # BLD AUTO: 0.5 10E9/L (ref 0–1.3)
MONOCYTES NFR BLD AUTO: 8.3 %
NEUTROPHILS # BLD AUTO: 3.5 10E9/L (ref 1.6–8.3)
NEUTROPHILS NFR BLD AUTO: 58 %
PLATELET # BLD AUTO: 272 10E9/L (ref 150–450)
RBC # BLD AUTO: 4.74 10E12/L (ref 3.8–5.2)
WBC # BLD AUTO: 6.1 10E9/L (ref 4–11)

## 2021-03-22 PROCEDURE — 36415 COLL VENOUS BLD VENIPUNCTURE: CPT | Performed by: FAMILY MEDICINE

## 2021-03-22 PROCEDURE — 71046 X-RAY EXAM CHEST 2 VIEWS: CPT | Performed by: RADIOLOGY

## 2021-03-22 PROCEDURE — 80050 GENERAL HEALTH PANEL: CPT | Performed by: FAMILY MEDICINE

## 2021-03-22 PROCEDURE — 99214 OFFICE O/P EST MOD 30 MIN: CPT | Performed by: FAMILY MEDICINE

## 2021-03-23 ENCOUNTER — MYC MEDICAL ADVICE (OUTPATIENT)
Dept: FAMILY MEDICINE | Facility: CLINIC | Age: 66
End: 2021-03-23

## 2021-03-23 DIAGNOSIS — Z91.09 CHEMICAL SENSITIVITY: ICD-10-CM

## 2021-03-23 DIAGNOSIS — J30.9 ALLERGIC RHINITIS: Primary | ICD-10-CM

## 2021-03-23 LAB
ALBUMIN SERPL-MCNC: 4.2 G/DL (ref 3.4–5)
ALP SERPL-CCNC: 51 U/L (ref 40–150)
ALT SERPL W P-5'-P-CCNC: 36 U/L (ref 0–50)
ANION GAP SERPL CALCULATED.3IONS-SCNC: 2 MMOL/L (ref 3–14)
AST SERPL W P-5'-P-CCNC: 19 U/L (ref 0–45)
BILIRUB SERPL-MCNC: 0.6 MG/DL (ref 0.2–1.3)
BUN SERPL-MCNC: 14 MG/DL (ref 7–30)
CALCIUM SERPL-MCNC: 9.1 MG/DL (ref 8.5–10.1)
CHLORIDE SERPL-SCNC: 109 MMOL/L (ref 94–109)
CO2 SERPL-SCNC: 29 MMOL/L (ref 20–32)
CREAT SERPL-MCNC: 0.87 MG/DL (ref 0.52–1.04)
GFR SERPL CREATININE-BSD FRML MDRD: 70 ML/MIN/{1.73_M2}
GLUCOSE SERPL-MCNC: 94 MG/DL (ref 70–99)
POTASSIUM SERPL-SCNC: 3.8 MMOL/L (ref 3.4–5.3)
PROT SERPL-MCNC: 7.7 G/DL (ref 6.8–8.8)
SODIUM SERPL-SCNC: 140 MMOL/L (ref 133–144)
TSH SERPL DL<=0.005 MIU/L-ACNC: 1.95 MU/L (ref 0.4–4)

## 2021-03-24 NOTE — TELEPHONE ENCOUNTER
I don't have any particular recommendations regarding local allergists.  I think returning to the [facility] is a good idea.

## 2021-03-24 NOTE — TELEPHONE ENCOUNTER
Perla Garsia MD     Patient asking if you have any recommendations of an allergist-    Please advise.    Thanks! Angie Coker RN

## 2021-04-09 ENCOUNTER — MYC MEDICAL ADVICE (OUTPATIENT)
Dept: FAMILY MEDICINE | Facility: CLINIC | Age: 66
End: 2021-04-09

## 2021-04-09 DIAGNOSIS — J30.9 ALLERGIC RHINITIS, UNSPECIFIED SEASONALITY, UNSPECIFIED TRIGGER: Primary | ICD-10-CM

## 2021-04-09 DIAGNOSIS — Z91.09 CHEMICAL SENSITIVITY: ICD-10-CM

## 2021-04-13 NOTE — TELEPHONE ENCOUNTER
Updated Allergy referral ordered.    Writer responded via [employer].    EDUIN CardonaN, RN  [facility]

## 2021-04-13 NOTE — TELEPHONE ENCOUNTER
Please give her a referral to . [facility] if that is what she prefers.  They are in the drop down.    Perla Garsia MD

## 2021-05-17 ENCOUNTER — MYC MEDICAL ADVICE (OUTPATIENT)
Dept: FAMILY MEDICINE | Facility: CLINIC | Age: 66
End: 2021-05-17

## 2021-05-18 NOTE — TELEPHONE ENCOUNTER
Writer responded via [employer].    EDUIN CardonaN, RN  [facility]

## 2021-07-29 ENCOUNTER — OFFICE VISIT (OUTPATIENT)
Dept: FAMILY MEDICINE | Facility: CLINIC | Age: 66
End: 2021-07-29
Payer: COMMERCIAL

## 2021-07-29 VITALS
HEIGHT: 64 IN | OXYGEN SATURATION: 97 % | HEART RATE: 70 BPM | SYSTOLIC BLOOD PRESSURE: 142 MMHG | DIASTOLIC BLOOD PRESSURE: 80 MMHG | RESPIRATION RATE: 15 BRPM | BODY MASS INDEX: 31.07 KG/M2 | TEMPERATURE: 97.6 F | WEIGHT: 182 LBS

## 2021-07-29 DIAGNOSIS — Z00.00 WELCOME TO MEDICARE PREVENTIVE VISIT: Primary | ICD-10-CM

## 2021-07-29 DIAGNOSIS — R03.0 ELEVATED BLOOD PRESSURE READING WITHOUT DIAGNOSIS OF HYPERTENSION: ICD-10-CM

## 2021-07-29 DIAGNOSIS — Z78.0 ASYMPTOMATIC POSTMENOPAUSAL STATUS: ICD-10-CM

## 2021-07-29 DIAGNOSIS — L98.9 LESION OF SKIN OF SCALP: ICD-10-CM

## 2021-07-29 DIAGNOSIS — Z23 NEED FOR VACCINATION: ICD-10-CM

## 2021-07-29 PROCEDURE — 90715 TDAP VACCINE 7 YRS/> IM: CPT | Performed by: FAMILY MEDICINE

## 2021-07-29 PROCEDURE — 90471 IMMUNIZATION ADMIN: CPT | Performed by: FAMILY MEDICINE

## 2021-07-29 PROCEDURE — G0402 INITIAL PREVENTIVE EXAM: HCPCS | Performed by: FAMILY MEDICINE

## 2021-07-29 ASSESSMENT — ENCOUNTER SYMPTOMS
HEMATOCHEZIA: 0
WEAKNESS: 0
FREQUENCY: 1
SORE THROAT: 0
ABDOMINAL PAIN: 0
FEVER: 0
EYE PAIN: 0
JOINT SWELLING: 1
DIARRHEA: 0
PALPITATIONS: 1
MYALGIAS: 1
HEARTBURN: 0
CHILLS: 0
DYSURIA: 0
NERVOUS/ANXIOUS: 1
NAUSEA: 0
HEADACHES: 0
DIZZINESS: 1
ARTHRALGIAS: 1
COUGH: 0
PARESTHESIAS: 0
HEMATURIA: 0
SHORTNESS OF BREATH: 0
CONSTIPATION: 0

## 2021-07-29 ASSESSMENT — MIFFLIN-ST. JEOR: SCORE: 1355.55

## 2021-07-29 ASSESSMENT — ACTIVITIES OF DAILY LIVING (ADL): CURRENT_FUNCTION: NO ASSISTANCE NEEDED

## 2021-07-29 NOTE — PATIENT INSTRUCTIONS
Did you know?   I do telehealth (video) visits exclusively on Wednesdays.  I still do in-person visits at [facility] ([phone]) on Mondays, Tuesdays and Thursdays.  You can schedule a video visit for follow-up appointments as well as future appointments for certain conditions.  Please see the below link.  [email or web address]    If you have not already done so,  I encourage you to sign up for [facility] ([email or web address]).  This will allow you to review your results, securely communicate with your provider care team, and schedule virtual visits as well.    To schedule any ordered imaging studies you can call [facility] at [phone].  If you are scheduling a DEXA (bone density) scan please do NOT schedule this at the [facility] [place].  Please ask that it be done at [facility] in [place].  Other preferred DEXA locations include [place] (at the [facility]), [place], or [place].        Patient Education     Exercise for a Healthier Heart  You may wonder how you can improve the health of your heart. If you re thinking about exercise, you re on the right track. You don t need to become an athlete. But you do need a certain amount of brisk exercise to help strengthen your heart. If you have been diagnosed with a heart condition, your healthcare provider may advise exercise to help stabilize your condition. To help make exercise a habit, choose safe, fun activities.      Exercise with a friend. When activity is fun, you're more likely to stick with it.   Before you start  Check with your healthcare provider before starting an exercise program. This is especially important if you have not been active for a while. It's also important if you have a long-term (chronic) health problem such as heart disease, diabetes, or obesity. Or if you are at high risk for  having these problems.   Why exercise?  Exercising regularly offers many healthy rewards. It can help you do all of the following:     Improve your blood cholesterol level to help prevent further heart trouble    Lower your blood pressure to help prevent a stroke or heart attack    Control diabetes, or reduce your risk of getting this disease    Improve your heart and lung function    Reach and stay at a healthy weight    Make your muscles stronger so you can stay active    Prevent falls and fractures by slowing the loss of bone mass (osteoporosis)    Manage stress better    Reduce your blood pressure    Improve your sense of self and your body image  Exercise tips      Ease into your routine. Set small goals. Then build on them. If you are not sure what your activity level should be, talk with your healthcare provider first before starting an exercise routine.    Exercise on most days. Aim for a total of 150 minutes (2 hours and 30 minutes) or more of moderate-intensity aerobic activity each week. Or 75 minutes (1 hour and 15 minutes) or more of vigorous-intensity aerobic activity each week. Or try for a combination of both. Moderate activity means that you breathe heavier and your heart rate increases but you can still talk. Think about doing 40 minutes of moderate exercise, 3 to 4 times a week. For best results, activity should last for about 40 minutes to lower blood pressure and cholesterol. It's OK to work up to the 40-minute period over time. Examples of moderate-intensity activity are walking 1 mile in 15 minutes. Or doing 30 to 45 minutes of yard work.    Step up your daily activity level.  Along with your exercise program, try being more active the whole day. Walk instead of drive. Or park further away so that you take more steps each day. Do more household tasks or yard work. You may not be able to meet the advised mount of physical activity. But doing some moderate- or vigorous-intensity aerobic activity  can help reduce your risk for heart disease. Your healthcare provider can help you figure out what is best for you.    Choose 1 or more activities you enjoy.  Walking is one of the easiest things you can do. You can also try swimming, riding a bike, dancing, or taking an exercise class.    When to call your healthcare provider  Call your healthcare provider if you have any of these:     Chest pain or feel dizzy or lightheaded    Burning, tightness, pressure, or heaviness in your chest, neck, shoulders, back, or arms    Abnormal shortness of breath    More joint or muscle pain    A very fast or irregular heartbeat (palpitations)  [employer] last reviewed this educational content on 7/1/2019 2000-2021 [employer]. All rights reserved. This information is not intended as a substitute for professional medical care. Always follow your healthcare professional's instructions.          Signs of Hearing Loss      Hearing much better with one ear can be a sign of hearing loss.   Hearing loss is a problem shared by many people. In fact, it is one of the most common health problems, particularly as people age. Most people age 65 and older have some hearing loss. By age 80, almost everyone does. Hearing loss often occurs slowly over the years. So you may not realize your hearing has gotten worse.  Have your hearing checked  Call your healthcare provider if you:    Have to strain to hear normal conversation    Have to watch other people s faces very carefully to follow what they re saying    Need to ask people to repeat what they ve said    Often misunderstand what people are saying    Turn the volume of the television or radio up so high that others complain    Feel that people are mumbling when they re talking to you    Find that the effort to hear leaves you feeling tired and irritated    Notice, when using the phone, that you hear better with one ear than the other  Car last reviewed this educational  content on 1/1/2020 2000-2021 [employer]. All rights reserved. This information is not intended as a substitute for professional medical care. Always follow your healthcare professional's instructions.        Your Health Risk Assessment indicates you feel you are not in good emotional health.    Recreation   Recreation is not limited to sports and team events. It includes any activity that provides relaxation, interest, enjoyment, and exercise. Recreation provides an outlet for physical, mental, and social energy. It can give a sense of worth and achievement. It can help you stay healthy.    Mental Exercise and Social Involvement  Mental and emotional health is as important as physical health. Keep in touch with friends and family. Stay as active as possible. Continue to learn and challenge yourself.   Things you can do to stay mentally active are:    Learn something new, like a foreign language or musical instrument.     Play SCRABBLE or do crossword puzzles. If you cannot find people to play these games with you at home, you can play them with others on your computer through the Internet.     Join a games club--anything from card games to chess or checkers or lawn bowling.     Start a new hobby.     Go back to school.     Volunteer.     Read.   Keep up with world events.          Patient Education     Back Exercises: Hip Rotator Stretch    To start, lie on your back with your knees bent and feet flat on the floor. Don t press your neck or lower back to the floor. Breathe deeply. You should feel comfortable and relaxed in this position.    Rest your right ankle on your left knee.    Place a towel behind your left thigh, and use it to pull the knee toward your chest. Feel the stretch in your buttocks.    Hold for 30 to 60 seconds. Release.    Repeat 2 times.    Switch legs.     If there is any pain other than stretch in the knee or buttock, stop and contact your healthcare provider.  For your  safety, check with your healthcare provider before starting an exercise program.    [employer] last reviewed this educational content on 3/1/2018    6244-2740 [employer]. All rights reserved. This information is not intended as a substitute for professional medical care. Always follow your healthcare professional's instructions.           Patient Education     Iliotibial Band Stretch (Flexibility)     1. Stand next to a chair. Hold onto the chair with your right hand for support. Cross your right leg behind your left leg.  2. Lean your right hip toward the right. Feel the stretch at the outside of your hip.  3. Hold for 30 to 60 seconds. Then relax.  4. Repeat 2 times, or as instructed.  5. Switch sides and repeat.  6. Do this 3 times a day, or as instructed.     Tip: Don t bend forward or twist at the waist.   [employer] last reviewed this educational content on 3/1/2020    1149-2731 [employer]. All rights reserved. This information is not intended as a substitute for professional medical care. Always follow your healthcare professional's instructions.

## 2021-07-29 NOTE — PROGRESS NOTES
"SUBJECTIVE:   Nenita Goins is a 66 year old adult who presents for Preventive Visit.      Patient has been advised of split billing requirements and indicates understanding: Yes   Are you in the first 12 months of your Medicare coverage?  Yes,  Visual Acuity:  Right Eye: 10/16   Left Eye: 10/12.5  Both Eyes: 10/10    Healthy Habits:     In general, how would you rate your overall health?  Good    Frequency of exercise:  1 day/week    Duration of exercise:  15-30 minutes    Do you usually eat at least 4 servings of fruit and vegetables a day, include whole grains    & fiber and avoid regularly eating high fat or \"junk\" foods?  Yes    Taking medications regularly:  Not Applicable    Medication side effects:  Not applicable    Ability to successfully perform activities of daily living:  No assistance needed    Home Safety:  No safety concerns identified    Hearing Impairment:  Difficulty following a conversation in a noisy restaurant or crowded room, feel that people are mumbling or not speaking clearly, difficulty following dialogue in the theater, need to ask people to speak up or repeat themselves, difficulty understanding soft or whispered speech and difficulty understanding speech on the telephone    In the past 6 months, have you been bothered by leaking of urine?  No    In general, how would you rate your overall mental or emotional health?  Fair      PHQ-2 Total Score: 1    Additional concerns today:  Yes    Do you feel safe in your environment? Yes    Have you ever done Advance Care Planning? (For example, a Health Directive, POLST, or a discussion with a medical provider or your loved ones about your wishes): Yes, advance care planning is on file. patient states she has since updated her HCD and will bring in a copy of the updated version.       Fall risk  Fallen 2 or more times in the past year?: No  Any fall with injury in the past year?: No    Cognitive Screening   1) Repeat 3 items (Leader, Season, " Table)    2) Clock draw: NORMAL  3) 3 item recall: Recalls 3 objects  Results: 3 items recalled: COGNITIVE IMPAIRMENT LESS LIKELY    Mini-CogTM Copyright ALISHA Lizarraga. Licensed by the author for use in [facility]; reprinted with permission (lola@[facility]). All rights reserved.      Do you have sleep apnea, excessive snoring or daytime drowsiness?: no    Reviewed and updated as needed this visit by clinical staff  Tobacco  Allergies  Meds  Problems  Med Hx  Surg Hx  Fam Hx  Soc Hx          Reviewed and updated as needed this visit by Provider   Allergies  Meds  Problems            Social History     Tobacco Use     Smoking status: Never Smoker     Smokeless tobacco: Never Used   Substance Use Topics     Alcohol use: Yes     Comment: 1-2 drinks per month     If you drink alcohol do you typically have >3 drinks per day or >7 drinks per week? No    Alcohol Use 7/29/2021   Prescreen: >3 drinks/day or >7 drinks/week? No   Prescreen: >3 drinks/day or >7 drinks/week? -       Current providers sharing in care for this patient include:   Patient Care Team:  Perla Garsia MD as PCP - General (Family Practice)  Marylu Goins NP as Assigned Pediatric Specialist Provider  Perla Garsia MD as Assigned PCP  Kathy Tovar MD as Assigned OBGYN Provider    The following health maintenance items are reviewed in Epic and correct as of today:  Health Maintenance Due   Topic Date Due     DEXA  Never done     ANNUAL REVIEW OF HM ORDERS  Never done     ZOSTER IMMUNIZATION (1 of 2) Never done     LIPID  10/31/2019     DTAP/TDAP/TD IMMUNIZATION (2 - Td or Tdap) 03/30/2021     FALL RISK ASSESSMENT  Never done     Pneumococcal Vaccine: 65+ Years (1 of 1 - PPSV23) 07/06/2021     COLORECTAL CANCER SCREENING  08/01/2021     BP Readings from Last 3 Encounters:   07/29/21 (!) 142/80   03/22/21 134/78   02/22/21 123/77    Wt Readings from Last 3 Encounters:   07/29/21 82.6 kg (182 lb)   03/22/21 83.5 kg (184 lb 1.6 oz)  "  02/22/21 82.6 kg (182 lb)         Mammogram Screening: Recommended mammography every 1-2 years with patient discussion and risk factor consideration  Pertinent mammograms are reviewed under the imaging tab.    Review of Systems   Constitutional: Negative for chills and fever.   HENT: Positive for ear pain and hearing loss. Negative for congestion and sore throat.    Eyes: Positive for visual disturbance. Negative for pain.   Respiratory: Negative for cough and shortness of breath.    Cardiovascular: Positive for palpitations. Negative for chest pain.   Gastrointestinal: Negative for abdominal pain, constipation, diarrhea and nausea.   Genitourinary: Positive for frequency. Negative for dysuria, genital sores, hematuria and urgency.   Musculoskeletal: Positive for arthralgias, joint swelling and myalgias.   Skin: Negative for rash.   Neurological: Positive for dizziness. Negative for weakness and headaches.   Psychiatric/Behavioral: The patient is nervous/anxious.          OBJECTIVE:   BP (!) 147/89 (BP Location: Right arm, Patient Position: Chair, Cuff Size: Adult Regular)   Pulse 69   Temp 97.6  F (36.4  C) (Tympanic)   Resp 15   Ht 1.626 m (5' 4\")   Wt 82.6 kg (182 lb)   LMP 03/01/2008   SpO2 97%   BMI 31.24 kg/m   Estimated body mass index is 31.24 kg/m  as calculated from the following:    Height as of this encounter: 1.626 m (5' 4\").    Weight as of this encounter: 82.6 kg (182 lb).  Physical Exam  GENERAL APPEARANCE: healthy, alert and no distress  EYES: Eyes grossly normal to inspection, conjunctivae and sclerae normal  HENT: ear canals and TM's normal  NECK: no adenopathy, no asymmetry, masses, or scars and thyroid normal to palpation  RESP: lungs clear to auscultation - no rales, rhonchi or wheezes  CV: regular rate and rhythm, normal S1 S2, no S3 or S4, no murmur, click or rub, no peripheral edema   ABDOMEN: soft, nontender, no hepatosplenomegaly, no masses   MS: no musculoskeletal defects are " "noted and gait is age appropriate without ataxia  SKIN: raised, erythematous papule on scalp (left side) with raised borders and central dimpling  NEURO: Normal strength and tone, sensory exam grossly normal, mentation intact and speech normal  PSYCH: mentation appears normal and affect normal/bright       ASSESSMENT / PLAN:       ICD-10-CM    1. Welcome to Medicare preventive visit  Z00.00    2. Need for vaccination  Z23 TDAP VACCINE (Adacel, Boostrix)  [[id number]]   3. Asymptomatic postmenopausal status  Z78.0 DX Hip/Pelvis/Spine   4. Lesion of skin of scalp  L98.9 Adult Dermatology Referral   5. Elevated blood pressure reading without diagnosis of hypertension  R03.0      She'll return to clinic for Medical Assistant (MA)-only appointment to recheck BP.    COUNSELING:  Reviewed preventive health counseling, as reflected in patient instructions   I recommended pneumovax which she declined    Estimated body mass index is 31.24 kg/m  as calculated from the following:    Height as of this encounter: 1.626 m (5' 4\").    Weight as of this encounter: 82.6 kg (182 lb).  Weight management plan: Discussed healthy diet and exercise guidelines    Nenita Goins reports that Nenita Goins has never smoked. Nenita Goins has never used smokeless tobacco.      Appropriate preventive services were discussed with this patient, including applicable screening as appropriate for cardiovascular disease, diabetes, osteopenia/osteoporosis, and glaucoma.  As appropriate for age/gender, discussed screening for colorectal cancer, prostate cancer, breast cancer, and cervical cancer. Checklist reviewing preventive services available has been given to the patient.    Reviewed patients plan of care and provided an AVS. The Basic Care Plan (routine screening as documented in Health Maintenance) for Nenita meets the Care Plan requirement. This Care Plan has been established and reviewed with the Patient.    Counseling Resources:  ATP IV " Guidelines  Pooled Cohorts Equation Calculator  Breast Cancer Risk Calculator  Breast Cancer: Medication to Reduce Risk  FRAX Risk Assessment  ICSI Preventive Guidelines  Dietary Guidelines for Americans, 2010  USDA's MyPlate  ASA Prophylaxis  Lung CA Screening    Perla Garsia MD  [facility]    Identified Health Risks:    Nenita Goins is at risk for lack of exercise and has been provided with information to increase physical activity for the benefit of Nenita Goins's well-being.  The patient was provided with written information regarding signs of hearing loss.  The patient was provided with suggestions to help Nenita Goins develop a healthy emotional lifestyle.

## 2021-07-29 NOTE — PROGRESS NOTES
Nenita Goins is at risk for lack of exercise and has been provided with information to increase physical activity for the benefit of Nenita Goins's well-being.  The patient was provided with written information regarding signs of hearing loss.  The patient was provided with suggestions to help Nenita Goins develop a healthy emotional lifestyle.

## 2021-07-29 NOTE — NURSING NOTE
Prior to immunization administration, verified patients identity using patient s name and date of birth. Please see Immunization Activity for additional information.     Screening Questionnaire for Adult Immunization     Are you sick today?   No   Do you have allergies to medications, food, a vaccine component or latex?   Yes   Have you ever had a serious reaction after receiving a vaccination?   Yes   Do you have a long-term health problem with heart, lung, kidney, or metabolic disease (e.g., diabetes), asthma, a blood disorder, no spleen, complement component deficiency, a cochlear implant, or a spinal fluid leak?  Are you on long-term aspirin therapy?   No   Do you have cancer, leukemia, HIV/AIDS, or any other immune system problem?   No   Do you have a parent, brother, or sister with an immune system problem?   No   In the past 3 months, have you taken medications that affect  your immune system, such as prednisone, other steroids, or anticancer drugs; drugs for the treatment of rheumatoid arthritis, Crohn s disease, or psoriasis; or have you had radiation treatments?   No   Have you had a seizure, or a brain or other nervous system problem?   No   During the past year, have you received a transfusion of blood or blood    products, or been given immune (gamma) globulin or antiviral drug?   No   For women: Are you pregnant or is there a chance you could become       pregnant during the next month?   No   Have you received any vaccinations in the past 4 weeks?   No     Immunization questionnaire was positive for at least one answer.  Notified Perla Garsia.        Per orders of Perla Morillo, injection of tdap given by Cheryl Cavazos MA. Patient instructed to remain in clinic for 15 minutes afterwards, and to report any adverse reaction to me immediately.       Screening performed by Cheryl Cavazos MA on 7/29/2021 at 12:52 PM.

## 2021-08-25 ENCOUNTER — LAB (OUTPATIENT)
Dept: LAB | Facility: CLINIC | Age: 66
End: 2021-08-25
Payer: COMMERCIAL

## 2021-08-25 ENCOUNTER — ALLIED HEALTH/NURSE VISIT (OUTPATIENT)
Dept: FAMILY MEDICINE | Facility: CLINIC | Age: 66
End: 2021-08-25
Payer: COMMERCIAL

## 2021-08-25 VITALS — OXYGEN SATURATION: 99 % | DIASTOLIC BLOOD PRESSURE: 80 MMHG | SYSTOLIC BLOOD PRESSURE: 124 MMHG | HEART RATE: 59 BPM

## 2021-08-25 DIAGNOSIS — E78.5 DYSLIPIDEMIA (HIGH LDL; LOW HDL): ICD-10-CM

## 2021-08-25 DIAGNOSIS — Z01.30 BP CHECK: Primary | ICD-10-CM

## 2021-08-25 PROCEDURE — 80061 LIPID PANEL: CPT

## 2021-08-25 PROCEDURE — 36415 COLL VENOUS BLD VENIPUNCTURE: CPT

## 2021-08-25 PROCEDURE — 99207 PR NO CHARGE NURSE ONLY: CPT

## 2021-08-25 NOTE — PROGRESS NOTES
Nenita Goins is a 66 year old patient who comes in today for a Blood Pressure check.  Initial BP:  LMP 03/01/2008      Data Unavailable  Disposition: results routed to provider.    Denilson Alvarez MA

## 2021-08-26 LAB
CHOLEST SERPL-MCNC: 221 MG/DL
FASTING STATUS PATIENT QL REPORTED: YES
HDLC SERPL-MCNC: 42 MG/DL
LDLC SERPL CALC-MCNC: 146 MG/DL
NONHDLC SERPL-MCNC: 179 MG/DL
TRIGL SERPL-MCNC: 165 MG/DL

## 2021-08-30 NOTE — RESULT ENCOUNTER NOTE
Juan Gutierres,  Your lipid panel (cholesterol) results are fairly stable compared to 2018.  Your total cholesterol, triglycerides, and LDL (bad) cholesterol are up a bit but your HDL (good) cholesterol is also up a bit.  Keep working on a healthy lifestyle with physical activity and good nutrition.    Perla Garsia MD

## 2021-09-02 NOTE — RESULT ENCOUNTER NOTE
How Severe Is Your Skin Lesion?: mild Juan Gutierres,  Your lab results are all essentially normal.  This includes blood counts, TSH (thyroid function test), and comprehensive metabolic panel results (liver function, kidney function, blood sugar, and blood salts).   This is reassuring.  Perla Garsia MD Have Your Skin Lesions Been Treated?: not been treated Is This A New Presentation, Or A Follow-Up?: Skin Lesions

## 2021-09-11 ENCOUNTER — HEALTH MAINTENANCE LETTER (OUTPATIENT)
Age: 66
End: 2021-09-11

## 2021-09-23 ENCOUNTER — TRANSFERRED RECORDS (OUTPATIENT)
Dept: HEALTH INFORMATION MANAGEMENT | Facility: CLINIC | Age: 66
End: 2021-09-23

## 2021-10-01 ENCOUNTER — MYC MEDICAL ADVICE (OUTPATIENT)
Dept: FAMILY MEDICINE | Facility: CLINIC | Age: 66
End: 2021-10-01

## 2021-10-05 NOTE — TELEPHONE ENCOUNTER
See RN response to patient's mychart message re:healthcare directive    EDUIN YangN RN  [facility]

## 2021-10-06 NOTE — TELEPHONE ENCOUNTER
Writer responded via [employer].    EDUIN CardonaN, RN  [facility]

## 2021-10-15 ENCOUNTER — DOCUMENTATION ONLY (OUTPATIENT)
Dept: OTHER | Facility: CLINIC | Age: 66
End: 2021-10-15

## 2021-10-25 ENCOUNTER — MYC MEDICAL ADVICE (OUTPATIENT)
Dept: FAMILY MEDICINE | Facility: CLINIC | Age: 66
End: 2021-10-25

## 2021-10-27 NOTE — TELEPHONE ENCOUNTER
Dr. Garsia: pt would like your option on a booster.    I need to talk to you about whether I should get the COVID-19 booster. As you may remember, I had strong and long-lasting reactions to the two Moderna shots I had last winter.    Lucie Pacheco RN  [facility]

## 2021-10-27 NOTE — TELEPHONE ENCOUNTER
Writer responded via [employer].    EDUIN CardonaN, RN  [facility]

## 2021-10-27 NOTE — TELEPHONE ENCOUNTER
If she wants to have a discussion then she really needs a visit.  A telehealth visit (a scheduled video or telephone visit) would be a good option.  Perla Garsia MD

## 2021-11-10 ENCOUNTER — VIRTUAL VISIT (OUTPATIENT)
Dept: FAMILY MEDICINE | Facility: CLINIC | Age: 66
End: 2021-11-10
Payer: COMMERCIAL

## 2021-11-10 DIAGNOSIS — Z71.85 VACCINE COUNSELING: Primary | ICD-10-CM

## 2021-11-10 PROCEDURE — 99214 OFFICE O/P EST MOD 30 MIN: CPT | Mod: 95 | Performed by: FAMILY MEDICINE

## 2021-11-10 NOTE — PATIENT INSTRUCTIONS
I think your primary Moderna COVID shots still provide good protection against severe COVID even without the booster.  However, your are still at risk for a breakthrough COVID infection.    Consider getting your flu shot first and see how that goes.      If you decide to get the COVID booster you may want to switch to the Pfizer vaccine for the booster as the dose is a little less than the Moderna vaccine.

## 2021-11-10 NOTE — PROGRESS NOTES
Nenita is a 66 year old who is being evaluated via a billable video visit.      How would you like to obtain your AVS? MyChart  If the video visit is dropped, the invitation should be resent by: Text to cell phone: [phone] (Mobile  Will anyone else be joining your video visit? No      Video Start Time: 9:09 AM    Assessment & Plan     Vaccine counseling  I encouraged her to start with the flu vaccine and see how that goes.  Discussed that the Moderna primary series does provide ongoing protection against severe COVID; breakthrough cases do occur but tend to be milder.  Discussed that patients do tend to get more severe side effects with subsequent COVID vaccines and that If she chooses to get the COVID booster she may want to get the Pfizer vaccine which is a slightly lower dose.        Patient Instructions   I think your primary Moderna COVID shots still provide good protection against severe COVID even without the booster.  However, your are still at risk for a breakthrough COVID infection.    Consider getting your flu shot first and see how that goes.      If you decide to get the COVID booster you may want to switch to the Pfizer vaccine for the booster as the dose is a little less than the Moderna vaccine.      No follow-ups on file.    Perla Garsia MD  [facility]    Gabbie Gutierres is a 66 year old who presents for the following health issues     HPI     Questions about whether or not she should get her booster shot due to side effects with the first and second dose.   Symptoms: extreme exhaustion which lasted a couple of weeks, also felt as if she had a concussion     She had profound extended fatigue following her 2nd COVID shot last spring. She also had brain fog symptoms and is worried that if she gets her COVID booster shot these symptoms will recur.    She notes that family and friends expect her to get the booster as well as a flu shot she is conflicted about  this.     She has had flu vaccines in the past but not regularly.        Review of Systems         Objective           Vitals:  No vitals were obtained today due to virtual visit.    Physical Exam   GENERAL: Healthy, alert and no distress  EYES: Eyes grossly normal to inspection.  No discharge or erythema, or obvious scleral/conjunctival abnormalities.  RESP: No audible wheeze, cough, or visible cyanosis.  No visible retractions or increased work of breathing.    SKIN: Visible skin clear. No significant rash, abnormal pigmentation or lesions.  NEURO: Cranial nerves grossly intact.  Mentation and speech appropriate for age.  PSYCH: Mentation appears normal, affect normal/bright, judgement and insight intact, normal speech and appearance well-groomed.          Video-Visit Details    Type of service:  Video Visit    Video End Time:9:28 AM    Originating Location (pt. Location): Home    Distant Location (provider location):  [facility]     Platform used for Video Visit: [employer]

## 2021-11-14 ENCOUNTER — MYC MEDICAL ADVICE (OUTPATIENT)
Dept: FAMILY MEDICINE | Facility: CLINIC | Age: 66
End: 2021-11-14
Payer: COMMERCIAL

## 2021-11-14 DIAGNOSIS — Z12.11 SCREEN FOR COLON CANCER: Primary | ICD-10-CM

## 2021-11-15 ENCOUNTER — MYC MEDICAL ADVICE (OUTPATIENT)
Dept: FAMILY MEDICINE | Facility: CLINIC | Age: 66
End: 2021-11-15
Payer: COMMERCIAL

## 2021-11-16 ENCOUNTER — MYC MEDICAL ADVICE (OUTPATIENT)
Dept: FAMILY MEDICINE | Facility: CLINIC | Age: 66
End: 2021-11-16
Payer: COMMERCIAL

## 2021-11-17 ENCOUNTER — TELEPHONE (OUTPATIENT)
Dept: GASTROENTEROLOGY | Facility: CLINIC | Age: 66
End: 2021-11-17
Payer: COMMERCIAL

## 2021-11-17 NOTE — TELEPHONE ENCOUNTER
Screening Questions  1. Are you active on mychart? YES    2. What insurance is in the chart? BCBS/MEDICARE    2.  Ordering/Referring Provider: Perla Garsia MD    3. BMI 30.9    4.  Respiratory Screening:     Do you use daily home oxygen? N  Do you have mod to severe Obstructive Sleep Apnea? N   Do you have Pulmonary Hypertension? N   Do you have SEVERE asthma? N    5. Have you had a heart or lung transplant? N    6. Are you currently on dialysis or have chronic kidney disease? N    7. Have you had a stroke or Transient ischemic attack (TIA) within 6 months? N    8. In the past 6 months, have you had any heart related issues including cardiomyopathy or heart attack? N      If yes, did it require cardiac stenting or other implantable device?N      9. Do you have any implantable devices in your body (pacemaker, defib, LVAD)? N    10. Do you take nitroglycerin? If yes, how often? N    11. Are you currently taking any blood thinners?N    12. Are you a diabetic? N    13. (Females) Are you currently pregnant? N  If yes, how many weeks?      15. Are you taking any prescription pain medications on a routine schedule? N If yes, MAC sedation.    16. Do you have any chemical dependencies such as alcohol, street drugs, or methadone? N If yes, MAC sedation.    17. Do you have any history of post-traumatic stress syndrome, severe anxiety or history of psychosis? ANXIETY    18. Do you transfer independently? Y    19.  Do you have any issues with constipation? N    20. Preferred Pharmacy for Pre Prescription [facility]    Scheduling Details    Which Colonoscopy Prep was Sent?: MPREP  Procedure Scheduled: COLON  Surgeon: MARGARITO  Date of Procedure: 12/16  Location: [facility]  Caller (Please ask for phone number if not scheduled by patient): Nenita Goins      Sedation Type: MAC  Conscious Sedation- Needs  for 6 hours after the procedure  MAC/General-Needs  for 24 hours after procedure    Pre-op Required at  [facility], [place], [place] and OR for MAC sedation:   (if yes advise patient they will need a pre-op prior to procedure)      Is patient on blood thinners? -N (If yes- inform patient to follow up with PCP or provider for follow up instructions)     Informed patient they will need an adult  Y  Cannot take any type of public or medical transportation alone    Pre-Procedure Covid test to be completed at [facility] or Externally:  12/13 1PM    Confirmed Nurse will call to complete assessment Y    Additional comments:

## 2021-11-18 DIAGNOSIS — Z11.59 ENCOUNTER FOR SCREENING FOR OTHER VIRAL DISEASES: ICD-10-CM

## 2021-12-06 ENCOUNTER — MYC MEDICAL ADVICE (OUTPATIENT)
Dept: FAMILY MEDICINE | Facility: CLINIC | Age: 66
End: 2021-12-06
Payer: COMMERCIAL

## 2021-12-09 ENCOUNTER — APPOINTMENT (OUTPATIENT)
Dept: URGENT CARE | Facility: CLINIC | Age: 66
End: 2021-12-09
Payer: COMMERCIAL

## 2021-12-09 NOTE — TELEPHONE ENCOUNTER
Writer responded via [employer].    EDUIN CardonaN, RN  [facility]

## 2021-12-09 NOTE — TELEPHONE ENCOUNTER
Writer responded via [employer].    EDUIN CardonaN, RN  [facility]

## 2021-12-09 NOTE — TELEPHONE ENCOUNTER
Writer responded via [employer].    EDUIN CardonaN, RN  [facility]

## 2021-12-21 NOTE — PATIENT INSTRUCTIONS
I kindly request that you check your MyChart prior to all appointments with me and complete any assigned questionnaires ahead of time.      If you have not already done so, I encourage you to sign up for Mychart ([email or web address]).  This will allow you to review your results, securely communicate with your care team, and schedule virtual visits as well.    FYI:  I do virtual (video) visits exclusively on Wednesdays.  I still do in-person visits at [facility] ([phone]) on Mondays, Tuesdays and Thursdays.  You can schedule a video visit for many conditions.  Please follow this link:  [email or web address]    To schedule any ordered imaging studies (including mammogram) you can call [facility]uling at [phone].  If you are scheduling a DEXA (bone density) scan please do NOT schedule this at the [facility].  Please ask that it be done at [facility] in [place].  Other preferred DEXA locations include [place] (at the [facility]), [place], or [place].

## 2021-12-22 ENCOUNTER — VIRTUAL VISIT (OUTPATIENT)
Dept: FAMILY MEDICINE | Facility: CLINIC | Age: 66
End: 2021-12-22
Payer: COMMERCIAL

## 2021-12-22 DIAGNOSIS — K21.9 GASTROESOPHAGEAL REFLUX DISEASE, UNSPECIFIED WHETHER ESOPHAGITIS PRESENT: ICD-10-CM

## 2021-12-22 DIAGNOSIS — R53.83 OTHER FATIGUE: Primary | ICD-10-CM

## 2021-12-22 PROCEDURE — 99213 OFFICE O/P EST LOW 20 MIN: CPT | Mod: 95 | Performed by: FAMILY MEDICINE

## 2021-12-22 NOTE — PROGRESS NOTES
"Nenita is a 66 year old who is being evaluated via a billable video visit.      How would you like to obtain your AVS? MyChart  If the video visit is dropped, the invitation should be resent by: Text to cell phone: [phone]   Will anyone else be joining your video visit? No      Video Start Time: 9:37 AM    Assessment & Plan     Other fatigue  We discussed, and she is aware, that most of the symptoms she describes are known side effects of the COVID mRNA vaccines.  However, in her case, they seem to be more severe and persistent, particularly the fatigue.  The course of her symptoms after this most recent Moderna booster is nearly identical to her reaction to the 2nd Moderna vaccine.  Discussed that these symptoms are not typical of an allergic response so I doubt that referral to allergist would be productive.  Ultimately she has had to choose between the risk of these side effects and the risk of sub-optimal vaccination, and I agree with her decision to get the booster after the break-through cases in her senior living building.      Gastroesophageal reflux disease, unspecified whether esophagitis present  She will eat a bland diet, smaller portions more frequently, and monitor her symptoms.  With those measures, and now that she is spending less time lying down, I hope that she can manage her GERD flare without medication which is her preference.       No follow-ups on file.    Perla Garsia MD  [facility]      Subjective   Nenita is a 66 year old who presents for the following health issues     HPI       Pt is here to follow up on Covid 19 booster symptoms - got booster 3 weeks ago    Dizziness, nausea, blurry, low grade fever. Spent a lot of time on the sofa. Ear ache and sore throat.    Sister had an allergic reaction (anaphylactic reaction) with Pfizer vaccine.       Patient sent the following update on 12/6:  \"Here's an update. I got the flu shot first, as you recommended. " "The date was . I had a small reaction to it, swelling and soreness, which I don't remember having with previous flu shots.     Shortly after that we got word at [facility] that we had 7 breakthrough COVID infections. Six people have since recovered; a 7th  in the hospital a week ago.      I was at the spreading event, although briefly and masked, and in the confusion of trying to sort out what's safe and what's not, I decided to just get the Moderna booster because it was easy. It was offered at [facility] on . In retrospect, it would have made more sense to follow your advice and look for the Pfizer booster, my thinking being that a different company would have used, at least to a certain extent, different ingredients so my body wouldn't have been primed to react to them.     As it was, my reaction to the booster was worse than the two previous reactions. That night I got the strong reaction that seems mostly to be limited to younger people, then started in on the long-term reaction of low energy, weakness and mild flu-like or allergy-like symptoms.      The overnight symptoms included muscle pain, weakness and cramps, joint pain, and strong chills. I felt too cold, weak and unstable to even get out of bed for another blanket. I didn't sleep most of the night but slept most of the next day.     Two days later, after I thought the swollen-arm symptom had eased up, it flared into what looked like an allergic reaction, swollen, red and itchy. I used calendula and a beeswax balm to ease it as I didn't want to add benadryl to the mix.    Two questions:  I think I'm allergic to one or more of the ingredients in the vaccine. Is there any way to confirm that?     I had three eye appointments, Oct 28, , and , the latter two for a laser treatment of \"secondary cataract.\" There are ingredients in the dilating and numbing eye drops that I (and they) know I'm allergic to but there aren't alternatives " "that I know of.  I'm wondering if those back-to-back exposures set me up for a worse reaction to the vaccine.      I have decided to postpone my colonoscopy until the new year, to give my body time to recover from all of these challenges.     Nenita Goins\"    Today she states:  Had initial reaction of flu-like symptoms as above.  Redness and swelling at the injection site resolved after a couple days - the itching lasted a couple weeks.    This was followed by a period of profound fatigue and generalized weakness.  She states she spent 3 weeks on the couch, napping frequently.  The fatigue has been gradually improving and is nearly resolved.  She wonders if this could be a version of chronic fatigue syndrome.    She also notes worsened reflux symptoms - maybe due to being recumbent on the couch and maybe due to not being able to cook her own food.  This is associated with sore throat and intermittent ear aches bilaterally as well as nocturnal cough.     Review of Systems         Objective           Vitals:  No vitals were obtained today due to virtual visit.    Physical Exam   GENERAL: Healthy, alert and no distress  EYES: Eyes grossly normal to inspection.  No discharge or erythema, or obvious scleral/conjunctival abnormalities.  RESP: No audible wheeze, cough, or visible cyanosis.  No visible retractions or increased work of breathing.    SKIN: Visible skin clear. No significant rash, abnormal pigmentation or lesions.  NEURO: Cranial nerves grossly intact.  Mentation and speech appropriate for age.  PSYCH: Mentation appears normal, affect normal/bright, judgement and insight intact, normal speech and appearance well-groomed.          Video-Visit Details    Type of service:  Video Visit    Video End Time:10:05 AM    Originating Location (pt. Location): Home    Distant Location (provider location):  [facility]     Platform used for Video Visit: [employer]  "

## 2022-01-12 ENCOUNTER — VIRTUAL VISIT (OUTPATIENT)
Dept: FAMILY MEDICINE | Facility: CLINIC | Age: 67
End: 2022-01-12
Payer: COMMERCIAL

## 2022-01-12 DIAGNOSIS — J34.89 SINUS PRESSURE: Primary | ICD-10-CM

## 2022-01-12 DIAGNOSIS — J30.9 ALLERGIC RHINITIS, UNSPECIFIED SEASONALITY, UNSPECIFIED TRIGGER: ICD-10-CM

## 2022-01-12 PROCEDURE — 99213 OFFICE O/P EST LOW 20 MIN: CPT | Mod: 95 | Performed by: FAMILY MEDICINE

## 2022-01-12 NOTE — PATIENT INSTRUCTIONS
To open up your sinuses consider taking benadryl at bedtime and using either NeilMed or Nettipot rinses.      Let me know if your sinus symptoms worsen.    Let me know if you'd like a referral to [facility] - on [address]

## 2022-01-12 NOTE — PROGRESS NOTES
"Nenita is a 66 year old who is being evaluated via a billable video visit.      How would you like to obtain your AVS? MyChart  If the video visit is dropped, the invitation should be resent by: Send to e-mail at: [email or web address]  Will anyone else be joining your video visit? No    Video Start Time: 12:29 PM    Assessment & Plan     Sinus pressure  Unlikely bacterial sinusitis.  She does not tolerate nasal steroid spray so I suggested nasal rinse with NeilMed or nettipot.  She does generally tolerate benadryl and she can take that at bedtime unless she finds it too drying.      Allergic rhinitis, unspecified seasonality, unspecified trigger  She has longstanding history of allergies and notes it has been over 20 years since her last allergy testing.  She'd like to be re-tested.  - Adult Allergy/Asthma Referral     Patient Instructions   To open up your sinuses consider taking benadryl at bedtime and using either NeilMed or Nettipot rinses.      Let me know if your sinus symptoms worsen.    Let me know if you'd like a referral to [facility] - on [address]        Perla Garsia MD  [facility]            Subjective   Nenita is a 66 year old who presents for the following health issues     HPI     Concern - Right jaw/cheek pain, sharp at times  She reports: \"For ca. 2 weeks my right cheek has felt cold, both sensation-wise and to the touch. Also some sores on right side of mouth, pain in lower jaw and in sinus below right eye, in a line with where the cold sensation happens.\"  Onset: 2 weeks - 12/24  Description: Cold sensation/cold to touch, had sores inside right cheek which have improved  Intensity: mild pain -  just with palpation  Progression of Symptoms:  improving  Accompanying Signs & Symptoms: sinus pain  Therapies tried and outcome: Eliminated black tea/focusing on diet - improving    Notes she was visiting friends on New Years and they have mold in their " house  Mouth sores have healed  Discomfort and cold sensation is starting to improve  No fever  Not exacerbated with chewing  Low grade headache   Some ear discomfort bilaterally.   Fatigue following recent COVID booster is improving - gradually    Would like referral to allergist for chronic post-nasal drainage with cough      Review of Systems   as above       Objective       Vitals:  No vitals were obtained today due to virtual visit.    Physical Exam   GENERAL: Healthy, alert and no distress  EYES: Eyes grossly normal to inspection.  No discharge or erythema, or obvious scleral/conjunctival abnormalities.  RESP: No audible wheeze, cough, or visible cyanosis.  No visible retractions or increased work of breathing.    SKIN: Visible skin clear. No significant rash, abnormal pigmentation or lesions.  NEURO: Cranial nerves grossly intact.  Mentation and speech appropriate for age.  PSYCH: Mentation appears normal, affect normal/bright, judgement and insight intact, normal speech and appearance well-groomed.          Video-Visit Details    Type of service:  Video Visit    Video End Time:12:49 PM    Originating Location (pt. Location): Home    Distant Location (provider location):  [facility]     Platform used for Video Visit: [employer]

## 2022-01-19 ENCOUNTER — MYC MEDICAL ADVICE (OUTPATIENT)
Dept: FAMILY MEDICINE | Facility: CLINIC | Age: 67
End: 2022-01-19
Payer: COMMERCIAL

## 2022-01-19 DIAGNOSIS — J30.9 ALLERGIC RHINITIS, UNSPECIFIED SEASONALITY, UNSPECIFIED TRIGGER: Primary | ICD-10-CM

## 2022-01-20 NOTE — TELEPHONE ENCOUNTER
"Referral updated and signed.  ([facility] is listed under \"external affiliated providers\"  "

## 2022-03-02 DIAGNOSIS — M25.552 PAIN OF BOTH HIP JOINTS: ICD-10-CM

## 2022-03-02 DIAGNOSIS — M25.551 PAIN OF BOTH HIP JOINTS: ICD-10-CM

## 2022-03-02 NOTE — TELEPHONE ENCOUNTER
Requesting refill on COMPOUNDED NON-CONTROLLED SUBSTANCE (CMPD RX) - PHARMACY TO MIX COMPOUNDED MEDICATION.     I am not able to order it to do alternative drug questions.

## 2022-03-03 NOTE — TELEPHONE ENCOUNTER
Dr. Garsia: not on protocol because it is compounded, though it's a standard dose. Hoping you have insight.    Last Written Prescription Date:  2/17/21  Last Fill Quantity: 100,  # refills: 1   Last office visit: virtual with Ady 1/12/22  Future Office Visit:      Lucie Pacheco RN  [facility]

## 2022-03-31 ENCOUNTER — OFFICE VISIT (OUTPATIENT)
Dept: FAMILY MEDICINE | Facility: CLINIC | Age: 67
End: 2022-03-31
Payer: COMMERCIAL

## 2022-03-31 VITALS
SYSTOLIC BLOOD PRESSURE: 130 MMHG | DIASTOLIC BLOOD PRESSURE: 66 MMHG | OXYGEN SATURATION: 97 % | TEMPERATURE: 97.5 F | HEART RATE: 68 BPM

## 2022-03-31 DIAGNOSIS — H61.23 BILATERAL IMPACTED CERUMEN: Primary | ICD-10-CM

## 2022-03-31 PROCEDURE — 69209 REMOVE IMPACTED EAR WAX UNI: CPT | Mod: 50 | Performed by: FAMILY MEDICINE

## 2022-03-31 NOTE — PROGRESS NOTES
Assessment & Plan     (H61.23) Bilateral impacted cerumen  (primary encounter diagnosis)  Comment: ear irrigation not successful, although a small amount of wax was extricated. I recommend home treatment, see AVS  Plan: REMOVE IMPACTED CERUMEN          Return in about 2 weeks (around 4/14/2022) for follow up if not improving.    Breanne Marvin MD  [facility] GALE Gutierres is a 66 year old who presents for the following health issues     History of Present Illness       Reason for visit:  Plugged up ear  Symptom onset:  3-4 weeks ago    Nenita Goins eats 4 or more servings of fruits and vegetables daily.Nenita Goins consumes 1 sweetened beverage(s) daily.Nenita Goins exercises with enough effort to increase Nenita Goins's heart rate 10 to 19 minutes per day.  Nenita Goins exercises with enough effort to increase Nenita Goins's heart rate 5 days per week.   Nenita Goins is taking medications regularly.             Review of Systems   Constitutional, HEENT, cardiovascular, pulmonary, gi and gu systems are negative, except as otherwise noted.      Objective    /66   Pulse 68   Temp 97.5  F (36.4  C)   LMP 03/01/2008   SpO2 97%   There is no height or weight on file to calculate BMI.  Physical Exam   GENERAL: healthy, alert and no distress  HENT: normal cephalic/atraumatic, both ears: impacted cerumen bilaterally  NECK: no adenopathy, no asymmetry, masses, or scars and thyroid normal to palpation  RESP: lungs clear to auscultation - no rales, rhonchi or wheezes  CV: regular rate and rhythm, normal S1 S2, no S3 or S4, no murmur, click or rub, no peripheral edema and peripheral pulses strong  MS: no gross musculoskeletal defects noted, no edema

## 2022-03-31 NOTE — PATIENT INSTRUCTIONS
Patient Education     Impacted Earwax     Inner ear structures including ear canal and eardrum.   Impacted earwax is a buildup of the natural wax in the ear. Impacted earwax is very common. It can cause symptoms such as hearing loss. It can also make it hard for a healthcare provider to check your ear.   Understanding earwax  Tiny glands in your ear make substances that combine with dead skin cells to form earwax. Earwax helps protect your ear canal from water, dirt, infection, and injury. Over time, earwax travels from the inner part of your ear canal to the entrance of the canal. Then it falls away naturally. But in some cases, it can t travel to the entrance of the canal. This may be because of a health condition or objects put in the ear. With age, earwax tends to become harder and less fluid. Older adults are more likely to have problems with earwax buildup.   What causes impacted earwax?  Earwax can build up because of many health conditions. Some cause a physical blockage. Others cause too much earwax to be made. Health conditions that can cause earwax buildup include:     Bony blockage in the ear (osteoma or exostoses)    Infections, such as an outer ear infection (external otitis)    Skin disease, such as eczema    Autoimmune diseases, such as lupus    A narrowed ear canal from birth, chronic inflammation, or injury    Too much earwax because of injury    Too much earwax because of  water in the ear canal  Putting objects in the ear again and again can also cause impacted earwax. For example, putting cotton swabs in the ear may push the wax deeper into the ear. Over time, this may cause blockage. Hearing aids, swimming plugs, and swim molds can also cause this problem when used again and again.   In some cases, the cause of impacted earwax is not known.  Symptoms of impacted earwax  Excess earwax often does not cause any symptoms, unless there is a large amount of buildup. Then it may cause symptoms such  as:     Hearing loss    Earache    Sense of ear fullness    Itching in the ear    Odor from the ear    Ear drainage    Dizziness    Ringing in the ears    Cough  Treatment for impacted earwax  If you don t have symptoms, you may not need treatment. Often the earwax goes away on its own with time. If you have symptoms, you may have 1 or more treatments such as:     Ear drops to soften the earwax. This helps it leave the ear over time.    Rinsing the ear canal with water. This is done in a healthcare provider s office.    Removing the earwax with small tools. This is also done in a provider s office.  In rare cases, some treatments for earwax removal may cause complications such as:    Outer ear infection    Earache    Short-term hearing loss    Dizziness    Water trapped in the ear canal    Hole in the eardrum    Ringing in the ears    Bleeding from the ear  Talk with your healthcare provider about which risks apply most to you.  Healthcare providers don't advise using ear candles or ear vacuum kits. These methods are not shown to work and may cause problems.   Preventing impacted earwax  You may not be able to prevent impacted earwax if you have a health condition that causes it, such as eczema. In other cases, you may be able to prevent earwax buildup by:     Using ear drops once a week    Having a regular ear cleaning about every 6 months    Not using cotton swabs in the ear  When to call the healthcare provider  Call your healthcare provider right away if you have:     Symptoms of impacted earwax    Severe symptoms after earwax removal, such as bleeding or severe ear pain    [employer] last reviewed this educational content on 9/1/2019 2000-2021 [employer]. All rights reserved. This information is not intended as a substitute for professional medical care. Always follow your healthcare professional's instructions.           Patient Education       Earwax, Home Treatment    Everyone produces earwax  from the lining of the ear canal. It serves to lubricate and protect the ear. The wax that forms in the canal naturally moves toward the outside of the ear and falls out. Sometimes the ear canal may contain too much wax. This can cause a blockage and loss of hearing. Directions are given below for home treatment.  Home care  If your doctor has advised you to remove a wax blockage yourself, follow these directions:    Unless a medicine was prescribed, you may use an over-the-counter product made for clearing earwax. These contain carbamide peroxide. Lie down with the blocked ear facing upward. Apply one dropper full of medicine and wait a few minutes. Grasp the outer ear and wiggle it to help the solution enter the canal.    Lean over a sink or basin with the blocked ear facing downward. Use a bulb syringe filled with warm (not hot or cold) water to rinse the ear several times. Use gentle pressure only.    If you are having trouble draining the water out of your ear canal, put a few drops of rubbing alcohol (isopropyl alcohol) into the ear canal. This will help remove the remaining water.    Repeat this procedure once a day for up to three days, or until your hearing is back to normal. Do not use this treatment for more than three days in a row.  Don ts    Don t use cold water to rinse the ear. This will make you dizzy.    Don t perform this procedure if you have an ear infection.    Don t perform this procedure if you have a ruptured eardrum.    Don t use cotton swabs, matches, hairpins, keys, or other objects to  clean  the ear canal. This can cause infection of the ear canal or rupture the eardrum. Because of their size and shape, cotton swabs can push earwax deeper into the ear canal instead of removing it.  Follow-up care  Follow up with your health care provider if you are not improving after three cleaning attempts, or as advised.  When to seek medical advice  Call your health care provider right away if any  of these occur:    Worsening ear pain    Fever of 101 F (38.3 C) or higher, or as directed by your health care provider    Hearing does not return to normal after three days of treatment    Fluid drainage or bleeding from the ear canal    Swelling, redness, or tenderness of the outer ear    Headache, neck pain, or stiff neck    6433-0138 [employer]. [address] [place] [address]. All rights reserved. This information is not intended as a substitute for professional medical care. Always follow your healthcare professional's instructions.

## 2022-03-31 NOTE — NURSING NOTE
Patient identified using two patient identifiers.  Ear exam showing wax occlusion completed by provider.  Solution: warm water was placed in the bilateral ear(s) via irrigation tool: elephant ear.    Denilson Alvarez MA on 3/31/2022 at 4:03 PM

## 2022-04-05 ENCOUNTER — TRANSFERRED RECORDS (OUTPATIENT)
Dept: HEALTH INFORMATION MANAGEMENT | Facility: CLINIC | Age: 67
End: 2022-04-05
Payer: COMMERCIAL

## 2022-04-23 ENCOUNTER — HEALTH MAINTENANCE LETTER (OUTPATIENT)
Age: 67
End: 2022-04-23

## 2022-05-23 ENCOUNTER — TELEPHONE (OUTPATIENT)
Dept: GASTROENTEROLOGY | Facility: CLINIC | Age: 67
End: 2022-05-23
Payer: COMMERCIAL

## 2022-05-23 NOTE — TELEPHONE ENCOUNTER
1. Rescheduling procedure from Dec 2021 Are you active on mychart? YES     2. What insurance is in the chart? BCBS/MEDICARE     2.  Ordering/Referring Provider: Perla Garsia MD     3. BMI 30.9     4.  Respiratory Screening:                Do you use daily home oxygen? N  Do you have mod to severe Obstructive Sleep Apnea? N              Do you have Pulmonary Hypertension? N              Do you have SEVERE asthma? N     5. Have you had a heart or lung transplant? N     6. Are you currently on dialysis or have chronic kidney disease? N     7. Have you had a stroke or Transient ischemic attack (TIA) within 6 months? N     8. In the past 6 months, have you had any heart related issues including cardiomyopathy or heart attack? N      If yes, did it require cardiac stenting or other implantable device?N        9. Do you have any implantable devices in your body (pacemaker, defib, LVAD)? N     10. Do you take nitroglycerin? If yes, how often? N     11. Are you currently taking any blood thinners?N     12. Are you a diabetic? N     13. (Females) Are you currently pregnant? N  If yes, how many weeks?        15. Are you taking any prescription pain medications on a routine schedule? N If yes, MAC sedation.     16. Do you have any chemical dependencies such as alcohol, street drugs, or methadone? N If yes, MAC sedation.     17. Do you have any history of post-traumatic stress syndrome, severe anxiety or history of psychosis? ANXIETY     18. Do you transfer independently? Y     19.  Do you have any issues with constipation? N     20. Preferred Pharmacy for Pre Prescription [facility]     Scheduling Details     Which Colonoscopy Prep was Sent?: MPREP  Procedure Scheduled: COLON  Surgeon:   Date of Procedure: 6/23/22  Location: [facility]  Caller (Please ask for phone number if not scheduled by patient): Nenita Goins        Sedation Type: MAC  Conscious Sedation- Needs  for 6 hours after the  procedure  MAC/General-Needs  for 24 hours after procedure     Pre-op Required at [facility] [place], [place] and OR for MAC sedation:   (if yes advise patient they will need a pre-op prior to procedure)      Is patient on blood thinners? -N (If yes- inform patient to follow up with PCP or provider for follow up instructions)      Informed patient they will need an adult  Y  Cannot take any type of public or medical transportation alone     Pre-Procedure Covid test to be completed at [facility] or Externally: [facility] 6/20      Confirmed Nurse will call to complete assessment Y     Additional comments:

## 2022-06-08 ENCOUNTER — TELEPHONE (OUTPATIENT)
Dept: GASTROENTEROLOGY | Facility: CLINIC | Age: 67
End: 2022-06-08

## 2022-06-08 NOTE — TELEPHONE ENCOUNTER
Attempted to contact patient regarding upcoming colonoscopy procedure on 6.23.2022 for pre assessment questions. No answer.     Left message to return call to [phone] #2    COVID test 6.20.2022;Discuss at home rapid antigen COVID test 1-2 days prior to procedure.    Arrival time: 0800    Facility location: [facility]    Sedation type: MAC    Indication for procedure: screening colonoscopy    Referring provider: Perla Garsia    Bowel prep recommendation: Miralax/Magnesium citrate/Dulcolax    Barbara Arnold RN

## 2022-06-14 ENCOUNTER — TELEPHONE (OUTPATIENT)
Dept: GASTROENTEROLOGY | Facility: CLINIC | Age: 67
End: 2022-06-14
Payer: COMMERCIAL

## 2022-06-14 NOTE — TELEPHONE ENCOUNTER
Caller: Nenita Goins    Procedure: Colon    Date, Location, and Surgeon of Procedure Cancelled:  [facility] 6/23    Ordering Provider:Perla Garsia    Reason for cancel (please be detailed, any staff messages or encounters to note?): family in town and too much going on        Rescheduled: n     If rescheduled:    Date:    Location:    Prep Resent: (changes to prep?)   Covid Test Rescheduled:    Note any change or update to original order/sedation:

## 2022-08-15 ENCOUNTER — OFFICE VISIT (OUTPATIENT)
Dept: FAMILY MEDICINE | Facility: CLINIC | Age: 67
End: 2022-08-15
Payer: COMMERCIAL

## 2022-08-15 VITALS
WEIGHT: 181.5 LBS | SYSTOLIC BLOOD PRESSURE: 127 MMHG | RESPIRATION RATE: 12 BRPM | DIASTOLIC BLOOD PRESSURE: 82 MMHG | TEMPERATURE: 98.1 F | HEART RATE: 56 BPM | OXYGEN SATURATION: 98 % | BODY MASS INDEX: 31.15 KG/M2

## 2022-08-15 DIAGNOSIS — M79.7 FIBROMYALGIA: Primary | ICD-10-CM

## 2022-08-15 DIAGNOSIS — Z12.11 SCREEN FOR COLON CANCER: ICD-10-CM

## 2022-08-15 PROCEDURE — 99214 OFFICE O/P EST MOD 30 MIN: CPT | Performed by: FAMILY MEDICINE

## 2022-08-15 RX ORDER — CHLORAL HYDRATE 500 MG
CAPSULE ORAL
COMMUNITY
End: 2024-03-15

## 2022-08-15 NOTE — PATIENT INSTRUCTIONS
You could try CBD    You can continue to use calendula topically.    You can look into therapists and Physical Therapists who specialize in Fibromyalgia.    If you want I could refer you to the [facility] to explore other modalities for pain management.      If you don t hear from a representative within 2 business days, please call [phone].      If you have MyChart:  1) I kindly request that you check your MyChart prior to all appointments with me and complete any assigned questionnaires ahead of time.    2) You may receive auto-released results from our system before I have the opportunity to review and comment.  Be assured I will review and comment on all of your results as soon as I can.

## 2022-08-15 NOTE — PROGRESS NOTES
Assessment & Plan     Fibromyalgia  I discussed that she meets the criteria for Fibromyalgia and that I do not think we need to repeat any rheumatologic tests.  The only exception would be to test CK level which we can do at a later date (with other labs).  Discussed chronic nature of fibromyalgia and gave her info on self-management.  Discussed that she can try other topicals.  She may try CBD products but is wary of any oral medications.  We also discussed pain therapy and pain PT.  I discussed that she may want to consider referral to the [facility] for access to these other modalities.  She will let me know.     Screen for colon cancer  - Colonscopy Screening  Referral         I spent a total of 37 minutes on the day of the visit.   Time spent doing chart review, history and exam, documentation and further activities per the note        See Patient Instructions     Follow-up Visit   Expected date:  Sep 15, 2022 (Approximate)      Follow Up Appointment Details:     Follow-up with whom?: Me    Follow-Up for what?: Medicare Wellness    Welcome or Annual?: Annual Wellness    How?: In Person    Is this an as-needed follow-up?: Meryl Garsia MD  [facility]        Gabbie Gutierres is a 67 year old, presenting for the following health issues:  Pain (Both hands and feet joints, muscle pain (both knees, hips, groin)-getting worse x1wk, pain comes and goes, fatigue, heat exhaustion ) and Eye Problem (Muscle spasm of left eye)      History of Present Illness       Reason for visit:  Chronic pain and fatigue    Nenita Goins eats 4 or more servings of fruits and vegetables daily.Nenita Goins consumes 1 sweetened beverage(s) daily.Nenita Goins exercises with enough effort to increase Nenita Goins's heart rate 10 to 19 minutes per day.  Nenita Goins exercises with enough effort to increase Nenita Goins's heart rate 5 days  "per week.   Nenita Goins is taking medications regularly.     3 months ago had an episode where she felt a a sudden chest pressure that lasted seconds - \"like a giant thumped on my chest.\"  She took a few deep breaths and it passed.      Fatigue since last week.  She was outdoors in the heat coordinating an event.  Has been exhausted since then.  She's tried gardening but it doesn't energize her as usual - is just further tiring her out.  Pain has flared all over since then too.  She has recently struggled with prolonged episodes of fatigue after each of her COVID shots but she did manage to get through the primary series (Moderna) and first booster.    She has been having a flare of her chronic muscle and joint pain.  Also headaches - all over.  No abdominal pain  Using ibuprofen daily now whereas previously used it occasionally.  She has multiple chemical sensitivities so avoids other oral medications. (Her ibuprofen is custom compounded with no additives.)  She did try a topical calendula salve that has been helpful.    She has had chronic pain that has waxed and waned.  Prior work-up has been unremarkable and includes:     ESR in 2020,     Lyme, RF, CCP, MYRA, ESR, Hep C, HIV in 2016,     Vit D, ESR, RF and MYRA in 2005.    Other labs checked over the years include CBC and TSH as well as CMP.    Review of Systems          Objective    /82   Pulse 56   Temp 98.1  F (36.7  C) (Oral)   Resp 12   Wt 82.3 kg (181 lb 8 oz)   LMP 03/01/2008   SpO2 98%   BMI 31.15 kg/m    Body mass index is 31.15 kg/m .  Physical Exam   GENERAL APPEARANCE: healthy, alert and no distress  EYES: Eyes grossly normal to inspection, conjunctivae and sclerae normal  MS: hands with no red, swollen or disfigured joints, no musculoskeletal defects are noted and gait is age appropriate without ataxia  SKIN: no suspicious lesions or rashes  NEURO: Normal strength and tone, sensory exam grossly normal, mentation intact and speech " normal  PSYCH: mentation appears normal and affect normal/bright     Nenita BRADLEY Buster did complete the Widespread Pain Index (WPI) / Symptom Severity Score with scores of 12 and 7 respectively, which does  meet criteria for Fibromyalgia.                 .  ..

## 2022-09-08 ENCOUNTER — TELEPHONE (OUTPATIENT)
Dept: FAMILY MEDICINE | Facility: CLINIC | Age: 67
End: 2022-09-08

## 2022-09-08 ENCOUNTER — OFFICE VISIT (OUTPATIENT)
Dept: URGENT CARE | Facility: URGENT CARE | Age: 67
End: 2022-09-08
Payer: COMMERCIAL

## 2022-09-08 VITALS — OXYGEN SATURATION: 97 % | HEART RATE: 59 BPM | WEIGHT: 181 LBS | BODY MASS INDEX: 31.07 KG/M2 | TEMPERATURE: 98.1 F

## 2022-09-08 DIAGNOSIS — L30.9 DERMATITIS: Primary | ICD-10-CM

## 2022-09-08 PROCEDURE — 99213 OFFICE O/P EST LOW 20 MIN: CPT | Performed by: PHYSICIAN ASSISTANT

## 2022-09-08 NOTE — PROGRESS NOTES
URGENT CARE VISIT:    SUBJECTIVE:   HPI:   Nenita Goins is a 67 year old who presents with rash located over left upper eyelid primarily since 3 day(s) ago. Rash is gradual onset and rash seems to be improving. Nenita Goins describes rash as asymptomatic and dry. Patient denies difficulty breathing or throat/tongue swelling. Patient has tried ointment with good relief of symptoms. Patient has not had contact exposures to new laundry detergents, soaps, lotions, or other potential irritants. Denies new foods or medications.  Patient denies previous history of a similar rash. No one around them has had a similar rash.     PMH:   Past Medical History:   Diagnosis Date     Abnormal Pap smear of cervix 02/22/2021 02/22/21     Allergic rhinitis     multiple chemical sensitivities     Anal fissure 11/20/2015     Anxiety disorder      Breast lump      Chemical sensitivity 09/14/2017    Multiple chemical sensitivities     Difficulty hearing 03/12/2012    Evaluated by audiology, mild, no hearing aids needed      Head injury 03/22/2013     Lactose intolerance      Mild intermittent asthma      Plantar tendonitis 11/20/2015     Uterine fibroid     multiple large fibroids on US 7/5/06. Not currently symptomatic.      Uterine fibroids     multiple large fibroids on US 7/5/06     Allergies: Aspirin, Contrast dye, Salvador flavor, Papaya, Cats, Dust mites, Ibuprofen sodium, Latex, Mold, Nsaids, Peanuts [nuts], Pollen extract, Chocolate, Corn-related products, Food, Latex, and Prednisolone   Medications:   Current Outpatient Medications   Medication Sig Dispense Refill     Calcium-Magnesium-Vitamin D (CALCIUM MAGNESIUM PO)  (Patient not taking: Reported on 9/8/2022)       Coenzyme Q10 (CO Q 10) 10 MG CAPS  (Patient not taking: Reported on 9/8/2022)       COMPOUNDED NON-CONTROLLED SUBSTANCE (CMPD RX) - PHARMACY TO MIX COMPOUNDED MEDICATION Ibuprofen capsule 200 mg every 4 hours prn pain (Patient not taking: Reported on 9/8/2022)  100 each 1     fish oil-omega-3 fatty acids 1000 MG capsule  (Patient not taking: Reported on 2022)       LUTEIN PO  (Patient not taking: Reported on 2022)       Social History:   Social History     Socioeconomic History     Marital status: Single     Spouse name: Susu Lujan     Number of children: 0     Years of education: 23-24     Highest education level: Not on file   Occupational History     Occupation: co op worker/student     Employer: [employer]   Tobacco Use     Smoking status: Never Smoker     Smokeless tobacco: Never Used   Substance and Sexual Activity     Alcohol use: Yes     Comment: 1-2 drinks per month     Drug use: No     Sexual activity: Yes     Partners: Female   Other Topics Concern     Parent/sibling w/ CABG, MI or angioplasty before 65F 55M? Yes     Comment: father  at age 52   Social History Narrative    09    Balanced Diet - Yes    Osteoporosis Prevention Measures - Dairy servings per day: 1 and Medication/Supplements (See current meds)    Regular Exercise -  Yes Describe yoga 4 times per week    Dental Exam - YES - Date: 2008    Eye Exam -  2-3 years ago    Self Breast Exam - Yes, on a monthly basis when she remembers    Abuse: Current or Past (Physical, Sexual or Emotional)- No    Do you feel safe in your environment - Yes    Guns stored in the home - No    Sunscreen used - No    Seatbelts used - Yes    Lipids -  YES - Date: 06    Glucose -  YES - Date: 06    Colon Cancer Screening - No    Hemoccults - NO    Pap Test -  YES - Date: 06    Do you have any concerns about STD's -  No    Mammography - YES - Date: 08    DEXA - NO    Immunizations reviewed and up to date - Yes, Td given 00    Paris Garcia MA                 Social Determinants of Health     Financial Resource Strain: Not on file   Food Insecurity: Not on file   Transportation Needs: Not on file   Physical Activity: Not on file   Stress: Not on file   Social Connections: Not  on file   Intimate Partner Violence: Not on file   Housing Stability: Not on file       ROS: ROS otherwise found to be negative except as noted above.    OBJECTIVE:  Pulse 59   Temp 98.1  F (36.7  C) (Tympanic)   Wt 82.1 kg (181 lb)   LMP 03/01/2008   SpO2 97%   BMI 31.07 kg/m    General: WDWN in NAD.   Eyes: Non-injected conjunctivas without drainage bilaterally.  Ears: Bilateral TMs are easily visualized without erythema, injection, or effusion. No erythema or edema of external canals.    Oropharynx: No erythema of oropharynx. No edema of tongue.   Cardiac: RRR without murmurs, rubs, or gallops.  Respiratory: LCTAB without adventitious sounds. Non-labored breathing.  Integumentary:   Distribution: localized  Location: left upper eyelid    Color: red and dry,  Lesion type: macular, confluent with no other findings  Size: 1 cm  Neuro: Alert and oriented.     ASSESSMENT:     ICD-10-CM    1. Dermatitis  L30.9         PLAN:  Patient Instructions   Patient was educated on the natural course of rash.  No clear etiology. I recommend moisturizer such as aquaphor ointment. See your primary care provider if symptoms worsen or do not improve in 7 days. Seek emergency care if you develop severe pain/redness, shortness of breath, or difficulty swallowing.    Patient verbalized understanding and is agreeable to plan. The patient was discharged ambulatory and in stable condition.    Lorena Minor PA-C on 9/8/2022 at 4:18 PM

## 2022-09-08 NOTE — PATIENT INSTRUCTIONS
Patient was educated on the natural course of rash.  No clear etiology. I recommend moisturizer such as aquaphor ointment. See your primary care provider if symptoms worsen or do not improve in 7 days. Seek emergency care if you develop severe pain/redness, shortness of breath, or difficulty swallowing.

## 2022-09-08 NOTE — TELEPHONE ENCOUNTER
Patient reports she started with a rash on one eyelid a couple days ago.  Does not itch but is spreading and has now spread to other eye as well and one eyelid is swollen.  Patient denies any vision changes at all.  Wondering if she should start eith primary care or eye doctor.  As this is derm issue at this point, would recommend primary care.  No appointments available in clinic.  Patient will go to Urgent Care after an appointment this morning.  Miesha Carvalho RN  [facility]

## 2022-09-22 ENCOUNTER — MYC MEDICAL ADVICE (OUTPATIENT)
Dept: FAMILY MEDICINE | Facility: CLINIC | Age: 67
End: 2022-09-22

## 2022-09-22 DIAGNOSIS — M79.7 FIBROMYALGIA: ICD-10-CM

## 2022-09-22 DIAGNOSIS — M79.673 PAIN OF FOOT, UNSPECIFIED LATERALITY: Primary | ICD-10-CM

## 2022-09-26 NOTE — TELEPHONE ENCOUNTER
Dr. Garsia-    Pt has wellness visit 10/6, should she see podiatry perhaps for sore spot on bottom of her foot?    EDUIN HawkinsN RN  [facility]

## 2022-09-26 NOTE — TELEPHONE ENCOUNTER
I can look at it at the appointment or she can schedule with [facility], [address], [place] [address], [phone].

## 2022-10-01 ASSESSMENT — ENCOUNTER SYMPTOMS
COUGH: 0
HEADACHES: 0
ABDOMINAL PAIN: 0
JOINT SWELLING: 0
CONSTIPATION: 0
SHORTNESS OF BREATH: 0
HEMATOCHEZIA: 0
NERVOUS/ANXIOUS: 0
HEMATURIA: 0
NAUSEA: 0
HEARTBURN: 0
CHILLS: 0
PARESTHESIAS: 0
EYE PAIN: 0
BREAST MASS: 0
DIZZINESS: 0
SORE THROAT: 0
PALPITATIONS: 0
ARTHRALGIAS: 1
DIARRHEA: 0
WEAKNESS: 1
MYALGIAS: 1
FEVER: 0
DYSURIA: 0
FREQUENCY: 0

## 2022-10-01 ASSESSMENT — ACTIVITIES OF DAILY LIVING (ADL): CURRENT_FUNCTION: NO ASSISTANCE NEEDED

## 2022-10-03 NOTE — TELEPHONE ENCOUNTER
Dr. Garsia-Please review and sign PT referral if agree.  We are not able to refer to [facility] and patient will be directed to within [facility].    Thank you!  EDUIN CardonaN, RN-BC  [facility]

## 2022-10-03 NOTE — TELEPHONE ENCOUNTER
Patient is needing / requesting a PT referral. Per last visit notes -  'You can look into therapists and Physical Therapists who specialize in Fibromyalgia.'    Patient is requesting referral to -  [facility]  [place] location, [address] [place] [address]  Fax number [phone]    States she is scheduled there the beginning of November.    Thanks!

## 2022-10-03 NOTE — TELEPHONE ENCOUNTER
Referral Coordinators-Please advise if patient can be referred to [facility]?    Thank you!  EDUIN CardonaN, RN-[facility]

## 2022-10-03 NOTE — TELEPHONE ENCOUNTER
PT order faxed to [facility], FAX: [phone].    Writer responded via [employer].    EDUIN CardonaN, RN-BC  [facility]

## 2022-10-03 NOTE — TELEPHONE ENCOUNTER
Dr. Garsia-Please review and sign if agree  1. [facility] is requesting referral.  Pended PT referral with notation this is a Physical Therapy Order.    Thank you!  EDUIN CardonaN, RN-[facility]

## 2022-10-03 NOTE — TELEPHONE ENCOUNTER
Sudha Page;  [facility] Triage 1 hour ago (1:58 PM)     PS    Pt has a managed care insurance plan that requires pt to stay within network.  [facility] is not in our network.       Aiad-Referral Coordinator

## 2022-10-03 NOTE — TELEPHONE ENCOUNTER
No, I would not refer her to [facility] for fibromyalgia.  She can go to [facility] she just needs to be informed that they are out of network so she'd have to pay for the services.  Let me know if she still wants PT orders for [facility].    Perla Garsia MD

## 2022-10-05 ENCOUNTER — TELEPHONE (OUTPATIENT)
Dept: GASTROENTEROLOGY | Facility: CLINIC | Age: 67
End: 2022-10-05

## 2022-10-05 ENCOUNTER — HOSPITAL ENCOUNTER (OUTPATIENT)
Facility: AMBULATORY SURGERY CENTER | Age: 67
End: 2022-10-05
Attending: INTERNAL MEDICINE
Payer: COMMERCIAL

## 2022-10-05 PROBLEM — M79.7 FIBROMYALGIA: Status: ACTIVE | Noted: 2022-10-05

## 2022-10-05 NOTE — TELEPHONE ENCOUNTER
Screening Questions  BLUE  KIND OF PREP RED  LOCATION [review exclusion criteria] GREEN  SEDATION TYPE        N Are you active on mychart?       Perla Garsia MD    Ordering/Referring Provider?        BCBS/MEDICARE What type of coverage do you have?      N Have you had a positive covid test in the last 90 days?     30.9 1. BMI  [BMI 40+ - review exclusion criteria]    Y  2. Are you able to give consent for your medical care? [IF NO,RN REVIEW]        N  3. Are you taking any prescription pain medications on a routine schedule?        3a. EXTENDED PREP What kind of prescription?     N 4. Do you have any chemical dependencies such as alcohol, street drugs, or methadone?    N 5. Do you have any history of post-traumatic stress syndrome, severe anxiety or history of psychosis?      **If yes 3- 5 , please schedule with MAC sedation.**          IF YES TO ANY 6 - 10 - HOSPITAL SETTING ONLY.     N 6.   Do you need assistance transferring?     N 7.   Have you had a heart or lung transplant?    N 8.   Are you currently on dialysis?   N 9.   Do you use daily home oxygen?   N 10. Do you take nitroglycerin?   10a.  If yes, how often?     11. [FEMALES]  N Are you currently pregnant?    11a.  If yes, how many weeks? [ Greater than 12 weeks, OR NEEDED]    N 12. Do you have Pulmonary Hypertension? *NEED PAC APPT AT [facility]*     N 13. [review exclusion criteria]  Do you have any implantable devices in your body (pacemaker, defib, LVAD)?    N 14. In the past 6 months, have you had any heart related issues including cardiomyopathy or heart attack?     14a.  If yes, did it require cardiac stenting if so when?     N 15. Have you had a stroke or Transient ischemic attack (TIA - aka  mini stroke ) within 6 months?      N 16. Do you have mod to severe Obstructive Sleep Apnea?  [Hospital only - Ok at [facility]]    N 17. Do you have SEVERE AND UNCONTROLLED asthma? *NEED PAC APPT AT [facility]*     N 18. Are you currently taking any blood  "thinners?     18a. If yes, inform patient to \"follow up w/ ordering provider for bridging instructions.\"    N 19. Do you take the medication Phentermine?    19a. If yes, \"Hold for 7 days before procedure.  Please consult your prescribing provider if you have questions about holding this medication.\"     N  20. Do you have chronic kidney disease?      N  21. Do you have a diagnosis of diabetes?     N  22. On a regular basis do you go 3-5 days between bowel movements?      23. Preferred LOCAL Pharmacy for Pre Prescription    [ LIST ONLY ONE PHARMACY]       [facility]        - CLOSING REMINDERS -    Informed patient they will need an adult    Cannot take any type of public or medical transportation alone    Conscious Sedation- Needs  for 6 hours after the procedure       MAC/General-Needs  for 24 hours after procedure    Pre-Procedure Covid test to be completed [[facility] PCR Testing Required]    Confirmed Nurse will call to complete assessment       - SCHEDULING DETAILS -     JIM  Surgeon    12/1  Date of Procedure  Lower Endoscopy [Colonoscopy]  Type of Procedure Scheduled   [facility]  [facility] PREP-If you answer yes to questions #8, #20, #21Which Colonoscopy Prep was Sent?     MODERATE Sedation Type     N PAC / Pre-op Required         Additional comments:  Patient is sensitive to sanitizers and perfumes. Instructed patient to discuss this with RN during pre-assessment call.        "

## 2022-10-06 ENCOUNTER — TELEPHONE (OUTPATIENT)
Dept: GASTROENTEROLOGY | Facility: CLINIC | Age: 67
End: 2022-10-06

## 2022-10-06 ENCOUNTER — OFFICE VISIT (OUTPATIENT)
Dept: FAMILY MEDICINE | Facility: CLINIC | Age: 67
End: 2022-10-06
Payer: COMMERCIAL

## 2022-10-06 VITALS
DIASTOLIC BLOOD PRESSURE: 72 MMHG | TEMPERATURE: 97.2 F | HEART RATE: 68 BPM | HEIGHT: 64 IN | OXYGEN SATURATION: 100 % | WEIGHT: 182 LBS | BODY MASS INDEX: 31.07 KG/M2 | RESPIRATION RATE: 15 BRPM | SYSTOLIC BLOOD PRESSURE: 130 MMHG

## 2022-10-06 DIAGNOSIS — Z13.220 SCREENING FOR HYPERLIPIDEMIA: ICD-10-CM

## 2022-10-06 DIAGNOSIS — M79.7 FIBROMYALGIA: ICD-10-CM

## 2022-10-06 DIAGNOSIS — Z12.31 ENCOUNTER FOR SCREENING MAMMOGRAM FOR BREAST CANCER: ICD-10-CM

## 2022-10-06 DIAGNOSIS — Z00.00 ENCOUNTER FOR MEDICARE ANNUAL WELLNESS EXAM: Primary | ICD-10-CM

## 2022-10-06 PROCEDURE — G0438 PPPS, INITIAL VISIT: HCPCS | Performed by: FAMILY MEDICINE

## 2022-10-06 ASSESSMENT — ENCOUNTER SYMPTOMS
COUGH: 0
DYSURIA: 0
WEAKNESS: 1
NAUSEA: 0
NERVOUS/ANXIOUS: 0
DIZZINESS: 0
HEMATURIA: 0
PALPITATIONS: 0
ABDOMINAL PAIN: 0
FREQUENCY: 0
HEADACHES: 0
FEVER: 0
ARTHRALGIAS: 1
JOINT SWELLING: 0
EYE PAIN: 0
SHORTNESS OF BREATH: 0
CHILLS: 0
SORE THROAT: 0
DIARRHEA: 0
MYALGIAS: 1
CONSTIPATION: 0

## 2022-10-06 ASSESSMENT — ACTIVITIES OF DAILY LIVING (ADL): CURRENT_FUNCTION: NO ASSISTANCE NEEDED

## 2022-10-06 NOTE — PROGRESS NOTES
"SUBJECTIVE:   Nenita is a 67 year old who presents for Preventive Visit.      Patient has been advised of split billing requirements and indicates understanding: Yes     Are you in the first 12 months of your Medicare coverage?  No    Healthy Habits:     In general, how would you rate your overall health?  Fair    Duration of exercise:  Less than 15 minutes    Do you usually eat at least 4 servings of fruit and vegetables a day, include whole grains    & fiber and avoid regularly eating high fat or \"junk\" foods?  Yes    Taking medications regularly:  Yes    Medication side effects:  Not applicable    Ability to successfully perform activities of daily living:  No assistance needed    Home Safety:  No safety concerns identified    Hearing Impairment:  Difficulty following a conversation in a noisy restaurant or crowded room, feel that people are mumbling or not speaking clearly, difficulty following dialogue in the theater, difficult to understand a speaker at a public meeting or Muslim service, need to ask people to speak up or repeat themselves, find that men's voices are easier to understand than woman's, difficulty understanding soft or whispered speech and difficulty understanding speech on the telephone    In the past 6 months, have you been bothered by leaking of urine?  No    In general, how would you rate your overall mental or emotional health?  Good      PHQ-2 Total Score: 0    Additional concerns today:  Yes     Do you feel safe in your environment? Yes    Have you ever done Advance Care Planning? (For example, a Health Directive, POLST, or a discussion with a medical provider or your loved ones about your wishes): Yes, patient states has an Advance Care Planning document and will bring a copy to the clinic.       Fall risk  Fallen 2 or more times in the past year?: No  Any fall with injury in the past year?: No    Cognitive Screening   1) Repeat 3 items (Leader, Season, Table)    2) Clock draw: " NORMAL  3) 3 item recall: Recalls 3 objects  Results: 3 items recalled: COGNITIVE IMPAIRMENT LESS LIKELY    Mini-CogTM Copyright LAISHA Lizarraga. Licensed by the author for use in [facility]; reprinted with permission (lola@[facility]). All rights reserved.          Reviewed and updated as needed this visit by clinical staff   Tobacco  Allergies  Meds  Problems  Med Hx  Surg Hx  Fam Hx  Soc   Hx          Reviewed and updated as needed this visit by Provider    Allergies  Meds  Problems              Social History     Tobacco Use     Smoking status: Never Smoker     Smokeless tobacco: Never Used   Substance Use Topics     Alcohol use: Yes     Comment: 1-2 drinks per month         Alcohol Use 10/1/2022   Prescreen: >3 drinks/day or >7 drinks/week? No   Prescreen: >3 drinks/day or >7 drinks/week? -         Current providers sharing in care for this patient include:   Patient Care Team:  Perla Garsia MD as PCP - General (Family Practice)  Perla Garsia MD as Assigned PCP    The following health maintenance items are reviewed in Epic and correct as of today:  Health Maintenance   Topic Date Due     DEXA  Never done     ZOSTER IMMUNIZATION (1 of 2) Never done     Pneumococcal Vaccine: 65+ Years (1 - PCV) Never done     COLORECTAL CANCER SCREENING  08/01/2021     COVID-19 Vaccine (4 - Booster for Moderna series) 01/24/2022     ANNUAL REVIEW OF HM ORDERS  07/29/2022     LIPID  08/25/2022     INFLUENZA VACCINE (1) 09/01/2022     MAMMO SCREENING  02/22/2023     MEDICARE ANNUAL WELLNESS VISIT  10/06/2023     FALL RISK ASSESSMENT  10/06/2023     PAP FOLLOW-UP  02/22/2024     HPV FOLLOW-UP  02/22/2024     ADVANCE CARE PLANNING  10/06/2027     DTAP/TDAP/TD IMMUNIZATION (3 - Td or Tdap) 07/29/2031     HEPATITIS C SCREENING  Completed     PHQ-2 (once per calendar year)  Completed     IPV IMMUNIZATION  Aged Out     MENINGITIS IMMUNIZATION  Aged Out     HEPATITIS B IMMUNIZATION  Aged Out     BP Readings from  Last 3 Encounters:   10/06/22 130/72   08/15/22 127/82   03/31/22 130/66    Wt Readings from Last 3 Encounters:   10/06/22 82.6 kg (182 lb)   09/08/22 82.1 kg (181 lb)   08/15/22 82.3 kg (181 lb 8 oz)             FHS-7:   Breast CA Risk Assessment (FHS-7) 10/1/2022   Did any of your first-degree relatives have breast or ovarian cancer? No   Did any of your relatives have bilateral breast cancer? No   Did any man in your family have breast cancer? No   Did any woman in your family have breast and ovarian cancer? Yes   Did any woman in your family have breast cancer before age 50 y? No   Do you have 2 or more relatives with breast and/or ovarian cancer? No   Do you have 2 or more relatives with breast and/or bowel cancer? Yes     Mammogram Screening: Recommended mammography every 1-2 years with patient discussion and risk factor consideration.  She prefers annual mammogram given FHX of breast cancer in PGM and I agree.  HM updated.  Pertinent mammograms are reviewed under the imaging tab.    Review of Systems   Constitutional: Negative for chills and fever.   HENT: Positive for hearing loss. Negative for congestion, ear pain and sore throat.    Eyes: Negative for pain and visual disturbance.   Respiratory: Negative for cough and shortness of breath.    Cardiovascular: Negative for chest pain and palpitations.   Gastrointestinal: Negative for abdominal pain, constipation, diarrhea and nausea.   Genitourinary: Negative for dysuria, frequency, genital sores, hematuria and urgency.   Musculoskeletal: Positive for arthralgias and myalgias. Negative for joint swelling.   Skin: Negative for rash.   Neurological: Positive for weakness. Negative for dizziness and headaches.   Psychiatric/Behavioral: The patient is not nervous/anxious.      Answers for HPI/ROS submitted by the patient on 10/1/2022  Blood in stool: No  heartburn: No  peripheral edema: No  mood changes: No  Skin sensation changes: No  pelvic pain: No  vaginal  "bleeding: No  vaginal discharge: No  tenderness: No  breast mass: No  breast discharge: No    She is scheduled to see Fibromyalgia PT.    OBJECTIVE:   /72 (BP Location: Right arm, Patient Position: Sitting, Cuff Size: Adult Large)   Pulse 68   Temp 97.2  F (36.2  C) (Temporal)   Resp 15   Ht 1.626 m (5' 4\")   Wt 82.6 kg (182 lb)   LMP 03/01/2008   SpO2 100%   BMI 31.24 kg/m   Estimated body mass index is 31.24 kg/m  as calculated from the following:    Height as of this encounter: 1.626 m (5' 4\").    Weight as of this encounter: 82.6 kg (182 lb).  Physical Exam  GENERAL APPEARANCE: healthy, alert and no distress  EYES: Eyes grossly normal to inspection, conjunctivae and sclerae normal  HENT: ear canals and TM's normal  NECK: no adenopathy, no asymmetry, masses, or scars and thyroid normal to palpation  RESP: lungs clear to auscultation - no rales, rhonchi or wheezes  CV: regular rate and rhythm, normal S1 S2, no S3 or S4, no murmur, click or rub, no peripheral edema   ABDOMEN: soft, nontender, no hepatosplenomegaly, no masses   MS: no musculoskeletal defects are noted and gait is age appropriate without ataxia  SKIN: no suspicious lesions or rashes  NEURO: Normal strength and tone, sensory exam grossly normal, mentation intact and speech normal  PSYCH: mentation appears normal and affect normal/bright       ASSESSMENT / PLAN:     Encounter for Medicare annual wellness exam  Reviewed/updated HM.  Discussed vaccines.  She declines flu vaccine and COVID bivalent booster given her severe side effects to prior COVID shots.  We also discussed Prevnar 20 which she is considering.      Screening for hyperlipidemia  - Lipid panel reflex to direct LDL Fasting    Encounter for screening mammogram for breast cancer  - MA Screening Digital Bilateral    Fibromyalgia  - CK total       COUNSELING:  Reviewed preventive health counseling, as reflected in patient instructions    Estimated body mass index is 31.24 kg/m  " "as calculated from the following:    Height as of this encounter: 1.626 m (5' 4\").    Weight as of this encounter: 82.6 kg (182 lb).    Weight management plan: Discussed healthy diet and exercise guidelines    Nenita Goins reports that Neinta Goins has never smoked. Nenita Goins has never used smokeless tobacco.      Appropriate preventive services were discussed with this patient, including applicable screening as appropriate for cardiovascular disease, diabetes, osteopenia/osteoporosis, and glaucoma.  As appropriate for age/gender, discussed screening for colorectal cancer, prostate cancer, breast cancer, and cervical cancer. Checklist reviewing preventive services available has been given to the patient.    Reviewed patients plan of care and provided an AVS. The Basic Care Plan (routine screening as documented in Health Maintenance) for Nenita meets the Care Plan requirement. This Care Plan has been established and reviewed with the Patient.    Counseling Resources:  ATP IV Guidelines  Pooled Cohorts Equation Calculator  Breast Cancer Risk Calculator  Breast Cancer: Medication to Reduce Risk  FRAX Risk Assessment  ICSI Preventive Guidelines  Dietary Guidelines for Americans, 2010  USDA's MyPlate  ASA Prophylaxis  Lung CA Screening    Perla Garsia MD  [facility]    Identified Health Risks:    The patient was provided with suggestions to help Nenita Goins develop a healthy physical lifestyle.  The patient was provided with written information regarding signs of hearing loss.  She declined audiology referral at this time.  "

## 2022-10-06 NOTE — PATIENT INSTRUCTIONS
Schedule a fasting lab-only appointment. Fast for 10 hours prior to labs: nothing to eat or drink except plain water and your pills for ten hours prior to appointment.  In addition, avoid fatty foods and alcohol for 24 hours prior to appointment.       Patient Education   Personalized Prevention Plan  You are due for the preventive services outlined below.  Your care team is available to assist you in scheduling these services.  If you have already completed any of these items, please share that information with your care team to update in your medical record.  Health Maintenance Due   Topic Date Due     Osteoporosis Screening  Never done     Zoster (Shingles) Vaccine (1 of 2) Never done     Pneumococcal Vaccine (1 - PCV) Never done     Colorectal Cancer Screening  08/01/2021     COVID-19 Vaccine (4 - Booster for Moderna series) 01/24/2022     ANNUAL REVIEW OF HM ORDERS  07/29/2022     Cholesterol Lab  08/25/2022     Flu Vaccine (1) 09/01/2022     Your Health Risk Assessment indicates you feel you are not in good health    A healthy lifestyle helps keep the body fit and the mind alert. It helps protect you from disease, helps you fight disease, and helps prevent chronic disease (disease that doesn't go away) from getting worse. This is important as you get older and begin to notice twinges in muscles and joints and a decline in the strength and stamina you once took for granted. A healthy lifestyle includes good healthcare, good nutrition, weight control, recreation, and regular exercise. Avoid harmful substances and do what you can to keep safe. Another part of a healthy lifestyle is stay mentally active and socially involved.    Good healthcare     Have a wellness visit every year.     If you have new symptoms, let us know right away. Don't wait until the next checkup.     Take medicines exactly as prescribed and keep your medicines in a safe place. Tell us if your medicine causes problems.   Healthy diet and  weight control     Eat 3 or 4 small, nutritious, low-fat, high-fiber meals a day. Include a variety of fruits, vegetables, and whole-grain foods.     Make sure you get enough calcium in your diet. Calcium, vitamin D, and exercise help prevent osteoporosis (bone thinning).     If you live alone, try eating with others when you can. That way you get a good meal and have company while you eat it.     Try to keep a healthy weight. If you eat more calories than your body uses for energy, it will be stored as fat and you will gain weight.     Recreation   Recreation is not limited to sports and team events. It includes any activity that provides relaxation, interest, enjoyment, and exercise. Recreation provides an outlet for physical, mental, and social energy. It can give a sense of worth and achievement. It can help you stay healthy.    Mental Exercise and Social Involvement  Mental and emotional health is as important as physical health. Keep in touch with friends and family. Stay as active as possible. Continue to learn and challenge yourself.   Things you can do to stay mentally active are:    Learn something new, like a foreign language or musical instrument.     Play SCRABBLE or do crossword puzzles. If you cannot find people to play these games with you at home, you can play them with others on your computer through the Internet.     Join a games club--anything from card games to chess or checkers or lawn bowling.     Start a new hobby.     Go back to school.     Volunteer.     Read.   Keep up with world events.    Signs of Hearing Loss      Hearing much better with one ear can be a sign of hearing loss.   Hearing loss is a problem shared by many people. In fact, it is one of the most common health problems, particularly as people age. Most people age 65 and older have some hearing loss. By age 80, almost everyone does. Hearing loss often occurs slowly over the years. So you may not realize your hearing has gotten  worse.  Have your hearing checked  Call your healthcare provider if you:    Have to strain to hear normal conversation    Have to watch other people s faces very carefully to follow what they re saying    Need to ask people to repeat what they ve said    Often misunderstand what people are saying    Turn the volume of the television or radio up so high that others complain    Feel that people are mumbling when they re talking to you    Find that the effort to hear leaves you feeling tired and irritated    Notice, when using the phone, that you hear better with one ear than the other  [employer] last reviewed this educational content on 1/1/2020 2000-2021 [employer]. All rights reserved. This information is not intended as a substitute for professional medical care. Always follow your healthcare professional's instructions.

## 2022-10-10 ENCOUNTER — TELEPHONE (OUTPATIENT)
Dept: GASTROENTEROLOGY | Facility: CLINIC | Age: 67
End: 2022-10-10

## 2022-10-10 ENCOUNTER — HOSPITAL ENCOUNTER (OUTPATIENT)
Facility: AMBULATORY SURGERY CENTER | Age: 67
End: 2022-10-10
Attending: INTERNAL MEDICINE
Payer: COMMERCIAL

## 2022-10-10 NOTE — TELEPHONE ENCOUNTER
Caller: Nenita Goins      Procedure: colon    Date, Location, and Surgeon of Procedure Cancelled: 12/1,SAIMA Guthrie        Reason for cancel (please be detailed, any staff messages or encounters to note?): sched error        Rescheduled: y     If rescheduled:    Date: 12/21   Location: [facility]   Prep Resent: y(changes to prep?)   Covid Test Rescheduled: Home test   Note any change or update to original order/sedation:

## 2022-10-14 ENCOUNTER — LAB (OUTPATIENT)
Dept: LAB | Facility: CLINIC | Age: 67
End: 2022-10-14
Payer: COMMERCIAL

## 2022-10-14 DIAGNOSIS — Z13.220 SCREENING FOR HYPERLIPIDEMIA: ICD-10-CM

## 2022-10-14 DIAGNOSIS — M79.7 FIBROMYALGIA: ICD-10-CM

## 2022-10-14 PROCEDURE — 80061 LIPID PANEL: CPT

## 2022-10-14 PROCEDURE — 82550 ASSAY OF CK (CPK): CPT

## 2022-10-14 PROCEDURE — 36415 COLL VENOUS BLD VENIPUNCTURE: CPT

## 2022-10-15 LAB
CHOLEST SERPL-MCNC: 200 MG/DL
CK SERPL-CCNC: 142 U/L (ref 30–300)
FASTING STATUS PATIENT QL REPORTED: YES
HDLC SERPL-MCNC: 42 MG/DL
LDLC SERPL CALC-MCNC: 131 MG/DL
NONHDLC SERPL-MCNC: 158 MG/DL
TRIGL SERPL-MCNC: 134 MG/DL

## 2022-10-15 NOTE — RESULT ENCOUNTER NOTE
Juan Gutierres,  Your CK (muscle enzyme) level is normal.  Your lipid panel (cholesterol) results are somewhat improved compared to last year - good job!  Perla Garsia MD

## 2022-10-24 ENCOUNTER — ANCILLARY PROCEDURE (OUTPATIENT)
Dept: MAMMOGRAPHY | Facility: CLINIC | Age: 67
End: 2022-10-24
Attending: FAMILY MEDICINE
Payer: COMMERCIAL

## 2022-10-24 DIAGNOSIS — Z12.31 ENCOUNTER FOR SCREENING MAMMOGRAM FOR BREAST CANCER: ICD-10-CM

## 2022-10-24 PROCEDURE — 77067 SCR MAMMO BI INCL CAD: CPT

## 2022-10-24 PROCEDURE — 77067 SCR MAMMO BI INCL CAD: CPT | Mod: 26 | Performed by: RADIOLOGY

## 2022-10-25 NOTE — RESULT ENCOUNTER NOTE
Juan Gutierres,  I am happy to see that you completed your mammogram and that your result is normal!  Perla Garsia MD

## 2022-10-29 ENCOUNTER — HEALTH MAINTENANCE LETTER (OUTPATIENT)
Age: 67
End: 2022-10-29

## 2022-12-08 ENCOUNTER — TELEPHONE (OUTPATIENT)
Dept: GASTROENTEROLOGY | Facility: CLINIC | Age: 67
End: 2022-12-08

## 2022-12-08 NOTE — TELEPHONE ENCOUNTER
Caller: Nenita Goins   Reason for Reschedule/Cancellation (please be detailed, any staff messages or encounters to note?):     PER PT -- DID NOT WANT TO DO DURING HOLIDAY SEASON -- WOULD LIKE A LTER DATE       Prior to reschedule please review:    Ordering Provider:YAMIL    Sedation per order:PER PROTOCOL     Does patient have any ASC Exclusions, please identify?: N      Notes on Cancelled Procedure:    Procedure:Lower Endoscopy [Colonoscopy]     Date: December / 21 /    [facility] [address] [place] [address], [address] [place] [address]    Surgeon: LEVENTHAL         Rescheduled: Yes    Procedure: colonoscopy    Date: February / 28 2023    Location:[facility]; [address] [place] [address], [address] [place] [address]    Surgeon: LOWELL     Sedation Level Scheduled  MODERATE ,  Reason for Sedation Level PER PROTOCOL     Prep/Instructions updated and sent: BOBBY

## 2023-01-18 ENCOUNTER — VIRTUAL VISIT (OUTPATIENT)
Dept: FAMILY MEDICINE | Facility: CLINIC | Age: 68
End: 2023-01-18
Payer: COMMERCIAL

## 2023-01-18 DIAGNOSIS — G47.00 INSOMNIA, UNSPECIFIED TYPE: ICD-10-CM

## 2023-01-18 DIAGNOSIS — R00.2 PALPITATIONS: Primary | ICD-10-CM

## 2023-01-18 DIAGNOSIS — G47.19 EXCESSIVE DAYTIME SLEEPINESS: ICD-10-CM

## 2023-01-18 PROCEDURE — 99214 OFFICE O/P EST MOD 30 MIN: CPT | Mod: 95 | Performed by: FAMILY MEDICINE

## 2023-01-18 ASSESSMENT — ANXIETY QUESTIONNAIRES
3. WORRYING TOO MUCH ABOUT DIFFERENT THINGS: NOT AT ALL
2. NOT BEING ABLE TO STOP OR CONTROL WORRYING: NEARLY EVERY DAY
IF YOU CHECKED OFF ANY PROBLEMS ON THIS QUESTIONNAIRE, HOW DIFFICULT HAVE THESE PROBLEMS MADE IT FOR YOU TO DO YOUR WORK, TAKE CARE OF THINGS AT HOME, OR GET ALONG WITH OTHER PEOPLE: SOMEWHAT DIFFICULT
7. FEELING AFRAID AS IF SOMETHING AWFUL MIGHT HAPPEN: SEVERAL DAYS
1. FEELING NERVOUS, ANXIOUS, OR ON EDGE: NOT AT ALL
6. BECOMING EASILY ANNOYED OR IRRITABLE: SEVERAL DAYS
GAD7 TOTAL SCORE: 5
5. BEING SO RESTLESS THAT IT IS HARD TO SIT STILL: NOT AT ALL
GAD7 TOTAL SCORE: 5

## 2023-01-18 ASSESSMENT — PATIENT HEALTH QUESTIONNAIRE - PHQ9: 5. POOR APPETITE OR OVEREATING: NOT AT ALL

## 2023-01-18 NOTE — PATIENT INSTRUCTIONS
Call [facility] cardiac scheduling [phone] to schedule the Ziopatch placement.      If you have MyChart:  1) I kindly request that you check your MyChart prior to all appointments with me and complete any assigned questionnaires ahead of time.    2) You may receive auto-released results from our system before I have the opportunity to review and comment.  Be assured I will review and comment on all of your results as soon as I can.      If you do not have MyChart:  1) I encourage you to sign up for Mychart ([email or web address]).  This will allow you to review your results, securely communicate with your care team, and schedule virtual visits as well.  2) Please be aware that result letters from the clinic can take up to 2 weeks but urgent results will be called to you.      FYI:  My schedule has been booking out very far in advance (2 months).  I apologize for the lack of timely access.  If you need to be seen for a chronic condition or preventive (wellness) visit, please be sure to schedule that appointment 2-3 months in advance.  If you have a concern that you feel cannot wait until my next available appointment (such as a hospital follow-up or new symptom of concern) please ask to speak to one of the [facility] nurses who may be able to access a sooner appointment.    I do ask that all patients who are taking chronic medications for conditions that I am managing schedule an in-person visit with me at least once a year.     To schedule any ordered imaging studies (including mammogram and/or DEXA scan) you can call [facility] Scheduling at [phone].

## 2023-01-18 NOTE — PROGRESS NOTES
"Nenita is a 67 year old who is being evaluated via a billable video visit.      How would you like to obtain your AVS? MyChart  If the video visit is dropped, the invitation should be resent by: Send to e-mail at: [email or web address]  Will anyone else be joining your video visit? Yes          Assessment & Plan       ICD-10-CM    1. Palpitations  R00.2 Adult Leadless EKG Monitor 8 to 14 Days      2. Excessive daytime sleepiness  G47.19 Adult Sleep Eval & Management  Referral      3. 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     Prior work-up has included CBC, TSH, CMP, CPK.      Given her report of palpitations I would like her to complete a 2-week Ziopatch.  We could also consider TTE.      While her bedpartner reports no snoring I would also like for Nenita to be evaluated by sleep clinic given her EDS and insomnia.     See Patient Instructions    No follow-ups on file.    Perla Garsia MD  [facility]        Gabbie Gutierres is a 67 year old accompanied by Nenitaminh Goins's spouse Susu, presenting for the following health issues:  pounding heart and skipped beats at night      History of Present Illness       Reason for visit:  Questions about heart issues as related to anxiety vs actual heart troubleMelminh Goins consumes 2 sweetened beverage(s) daily.Nenita Goins exercises with enough effort to increase Nenitajuan carlos Goins's heart rate 9 or less minutes per day.  Nenita Goins exercises with enough effort to increase Nenita Goins's heart rate 3 or less days per week.   Nenita KIRK Goins is taking medications regularly.     Per appointment note:  I want to ask about heart pounding and heart rate as relates to anxiety, food sensitivities, stress, other possible causes    Has " "noticed symptoms for past several weeks.  Occurs when she lies down at night.  Feels skipped beats - HR feels irregular but not fast  Low energy intermittently  Sometimes feels heart beats faster than it should with minimal exertion.  Has post-exertional malaise and \"fuzz brain\"  No muscle pain.  She notices shortness of breath in the morning that she addresses by getting up and doing breathing exercises (slow deep breathing).  She states this does not feel like the SOB she had with prior history of asthma.  No wheezing.  On a good day she can walk around a city block but now sometimes gets fatigued walking down a hallway.  Tried doing PT at [facility] (which was for Fibromyalgia PT) but felt they just gave her HEP and she can't \"build up\" her conditioning - her endurance is unpredictable.  She does have EDS - naps during the day.  Sleep: Trouble falling asleep.  Usually get ups to void 1-2 times per night and has trouble falling back to sleep.  Wakes up feeling unrefreshed.  Doesn't snore per bedpartner.      PHQ 3/10/2021   PHQ-9 Total Score 6   Q9: Thoughts of better off dead/self-harm past 2 weeks Not at all     THOMAS-7 SCORE 4/30/2014 3/10/2021 1/18/2023   Total Score 12 - -   Total Score - 4 (minimal anxiety) -   Total Score - 4 5         Review of Systems   as above       Objective    Vitals - Patient Reported  Weight (Patient Reported): 82.1 kg (181 lb)        Physical Exam   GENERAL: Healthy, alert and no distress  EYES: Eyes grossly normal to inspection.  No discharge or erythema, or obvious scleral/conjunctival abnormalities.  RESP: No audible wheeze, cough, or visible cyanosis.  No visible retractions or increased work of breathing.    SKIN: Visible skin clear. No significant rash, abnormal pigmentation or lesions.  NEURO: Cranial nerves grossly intact.  Mentation and speech appropriate for age.  PSYCH: Mentation appears normal, affect normal/bright, judgement and insight intact, normal speech and appearance " well-groomed.        Video-Visit Details    Type of service:  Video Visit   Video Start Time: 10:31 AM  Video End Time:11:04 AM  Originating Location (pt. Location): Home  Distant Location (provider location):  Off-site  Platform used for Video Visit: Edita

## 2023-01-30 ENCOUNTER — ANCILLARY PROCEDURE (OUTPATIENT)
Dept: CARDIOLOGY | Facility: CLINIC | Age: 68
End: 2023-01-30
Attending: FAMILY MEDICINE
Payer: COMMERCIAL

## 2023-01-30 DIAGNOSIS — R00.2 PALPITATIONS: ICD-10-CM

## 2023-01-30 PROCEDURE — 93248 EXT ECG>7D<15D REV&INTERPJ: CPT | Performed by: INTERNAL MEDICINE

## 2023-01-30 PROCEDURE — 93246 EXT ECG>7D<15D RECORDING: CPT

## 2023-02-15 ENCOUNTER — TELEPHONE (OUTPATIENT)
Dept: GASTROENTEROLOGY | Facility: CLINIC | Age: 68
End: 2023-02-15

## 2023-02-15 DIAGNOSIS — Z12.11 SPECIAL SCREENING FOR MALIGNANT NEOPLASMS, COLON: Primary | ICD-10-CM

## 2023-02-15 RX ORDER — BISACODYL 5 MG
TABLET, DELAYED RELEASE (ENTERIC COATED) ORAL
Qty: 4 TABLET | Refills: 0 | Status: SHIPPED | OUTPATIENT
Start: 2023-02-15 | End: 2023-03-01

## 2023-02-15 NOTE — TELEPHONE ENCOUNTER
Attempted to contact patient regarding upcoming Colonoscopy  procedure on 2/28/23 for pre assessment questions. No answer.     Left message to return call to [phone] #4    Discuss Covid policy and designated  policy.    Pre op exam scheduled: N/A    Arrival time: 0845. Procedure time: 0945    [facility]; [address] [place] [address], [address], [place] [address]    Sedation type: Conscious sedation     Anticoagulants: No    Electronic implanted devices? No    Diabetic? No    Indication for procedure: screening colonoscopy    Bowel prep recommendation: Standard Golytely      Prep instructions sent via [email or web address]. Bowel prep script sent to     [place], [place] - [address]      Kay Whitney RN  Endoscopy Procedure Pre Assessment RN

## 2023-02-20 RX ORDER — BISACODYL 5 MG
TABLET, DELAYED RELEASE (ENTERIC COATED) ORAL
Qty: 4 TABLET | Refills: 0 | Status: SHIPPED | OUTPATIENT
Start: 2023-02-20 | End: 2023-03-01

## 2023-02-20 NOTE — RESULT ENCOUNTER NOTE
Juan Gutierres,  Your Ziopatch results show no worrisome arrhythmias.  The rhythm noted when you flagged that you were having symptoms was always normal sinus rhythm.      Perla Garsia MD

## 2023-02-20 NOTE — TELEPHONE ENCOUNTER
Pt returning call    Pre assessment questions completed for upcoming Colonoscopy  procedure scheduled on 2/28/23    COVID policy reviewed.     Reviewed procedural arrival time 0845, procedure time 0945 and facility location [facility]; [address] [place] [address], [address] Floor, [place] [address]    Designated  policy reviewed. Instructed to have someone stay 6 hours post procedure.     Reviewed procedure prep instructions.     Prep instructions sent via [employer].  Bowel prep script sent to     [place], [place] - [address]     Patient verbalized understanding and had no questions or concerns at this time.    Kay Whitney, DONNA  Endoscopy Procedure Pre Assessment RN

## 2023-02-28 ENCOUNTER — ANESTHESIA (OUTPATIENT)
Dept: SURGERY | Facility: AMBULATORY SURGERY CENTER | Age: 68
End: 2023-02-28
Payer: COMMERCIAL

## 2023-02-28 ENCOUNTER — HOSPITAL ENCOUNTER (OUTPATIENT)
Facility: AMBULATORY SURGERY CENTER | Age: 68
Discharge: HOME | End: 2023-02-28
Attending: INTERNAL MEDICINE | Admitting: INTERNAL MEDICINE
Payer: COMMERCIAL

## 2023-02-28 ENCOUNTER — ANESTHESIA EVENT (OUTPATIENT)
Dept: SURGERY | Facility: AMBULATORY SURGERY CENTER | Age: 68
End: 2023-02-28
Payer: COMMERCIAL

## 2023-02-28 VITALS
RESPIRATION RATE: 20 BRPM | HEIGHT: 65 IN | OXYGEN SATURATION: 99 % | WEIGHT: 185 LBS | BODY MASS INDEX: 30.82 KG/M2 | DIASTOLIC BLOOD PRESSURE: 77 MMHG | SYSTOLIC BLOOD PRESSURE: 128 MMHG | HEART RATE: 71 BPM | TEMPERATURE: 97.2 F

## 2023-02-28 VITALS — HEART RATE: 70 BPM

## 2023-02-28 LAB — COLONOSCOPY: NORMAL

## 2023-02-28 PROCEDURE — G0121 COLON CA SCRN NOT HI RSK IND: HCPCS

## 2023-02-28 RX ORDER — PROPOFOL 10 MG/ML
INJECTION, EMULSION INTRAVENOUS CONTINUOUS PRN
Status: DISCONTINUED | OUTPATIENT
Start: 2023-02-28 | End: 2023-02-28

## 2023-02-28 RX ORDER — ONDANSETRON 2 MG/ML
4 INJECTION INTRAMUSCULAR; INTRAVENOUS
Status: DISCONTINUED | OUTPATIENT
Start: 2023-02-28 | End: 2023-03-01 | Stop reason: HOSPADM

## 2023-02-28 RX ORDER — LIDOCAINE 40 MG/G
CREAM TOPICAL
Status: DISCONTINUED | OUTPATIENT
Start: 2023-02-28 | End: 2023-03-01 | Stop reason: HOSPADM

## 2023-02-28 RX ORDER — SODIUM CHLORIDE, SODIUM LACTATE, POTASSIUM CHLORIDE, CALCIUM CHLORIDE 600; 310; 30; 20 MG/100ML; MG/100ML; MG/100ML; MG/100ML
INJECTION, SOLUTION INTRAVENOUS CONTINUOUS PRN
Status: DISCONTINUED | OUTPATIENT
Start: 2023-02-28 | End: 2023-02-28

## 2023-02-28 RX ORDER — LIDOCAINE HYDROCHLORIDE 20 MG/ML
INJECTION, SOLUTION INFILTRATION; PERINEURAL PRN
Status: DISCONTINUED | OUTPATIENT
Start: 2023-02-28 | End: 2023-02-28

## 2023-02-28 RX ORDER — PROPOFOL 10 MG/ML
INJECTION, EMULSION INTRAVENOUS PRN
Status: DISCONTINUED | OUTPATIENT
Start: 2023-02-28 | End: 2023-02-28

## 2023-02-28 RX ORDER — SODIUM CHLORIDE 9 MG/ML
INJECTION, SOLUTION INTRAVENOUS CONTINUOUS PRN
Status: DISCONTINUED | OUTPATIENT
Start: 2023-02-28 | End: 2023-02-28

## 2023-02-28 RX ADMIN — PROPOFOL 200 MCG/KG/MIN: 10 INJECTION, EMULSION INTRAVENOUS at 10:13

## 2023-02-28 RX ADMIN — LIDOCAINE HYDROCHLORIDE 50 MG: 20 INJECTION, SOLUTION INFILTRATION; PERINEURAL at 10:09

## 2023-02-28 RX ADMIN — PROPOFOL 20 MG: 10 INJECTION, EMULSION INTRAVENOUS at 10:14

## 2023-02-28 RX ADMIN — PROPOFOL 100 MG: 10 INJECTION, EMULSION INTRAVENOUS at 10:11

## 2023-02-28 RX ADMIN — SODIUM CHLORIDE: 9 INJECTION, SOLUTION INTRAVENOUS at 09:25

## 2023-02-28 RX ADMIN — PROPOFOL 300 MG: 10 INJECTION, EMULSION INTRAVENOUS at 10:13

## 2023-02-28 NOTE — ANESTHESIA CARE TRANSFER NOTE
Patient: Nenita Goins    Procedure: Procedure(s):  COLONOSCOPY       Diagnosis: Screen for colon cancer [Z12.11]  Diagnosis Additional Information: No value filed.    Anesthesia Type:   MAC     Note:    Oropharynx: oropharynx clear of all foreign objects and spontaneously breathing  Level of Consciousness: awake  Oxygen Supplementation: room air    Independent Airway: airway patency satisfactory and stable  Dentition: dentition unchanged  Vital Signs Stable: post-procedure vital signs reviewed and stable  Report to RN Given: handoff report given  Patient transferred to: Phase II  Comments: Uneventful transport   Report to RN - Bette CARLO  Pt comfortable  Exchanging well; color natl  Pt responds appropriately to command  IV patent  Lips/teeth/dentition as preop status  Questions answered    Handoff Report: Identifed the Patient, Identified the Reponsible Provider, Reviewed the pertinent medical history, Discussed the surgical course, Reviewed Intra-OP anesthesia mangement and issues during anesthesia, Set expectations for post-procedure period and Allowed opportunity for questions and acknowledgement of understanding      Vitals:  Vitals Value Taken Time   /68 1039   Temp 97.2    Pulse 64    Resp 16    SpO2 99%        Electronically Signed By: FABIAN MONTEJO CRNA  February 28, 2023  10:43 AM

## 2023-02-28 NOTE — H&P
Nenita Goins  [id number]  adult  67 year old      Reason for procedure/surgery: colon cancer screening    Patient Active Problem List   Diagnosis     Allergic rhinitis     Uterine fibroid     Overweight (BMI 25.0-29.9)     Dyslipidemia (high LDL; low HDL)     Elevated triglycerides with high cholesterol     GERD (gastroesophageal reflux disease)     Lipoma of skin and subcutaneous tissue     Difficulty hearing     Seborrheic keratoses     THOMAS (generalized anxiety disorder)     Chemical sensitivity     ASCUS of cervix with negative high risk HPV     Elevated blood pressure reading without diagnosis of hypertension     Lesion of skin of scalp     Fibromyalgia       Past Surgical History:    Past Surgical History:   Procedure Laterality Date     COLONOSCOPY  8/1/11     ESOPHAGOSCOPY, GASTROSCOPY, DUODENOSCOPY (EGD), COMBINED  7/2/2013    Procedure: COMBINED ESOPHAGOSCOPY, GASTROSCOPY, DUODENOSCOPY (EGD), BIOPSY SINGLE OR MULTIPLE;  COMBINED ESOPHAGOSCOPY, GASTROSCOPY, DUODENOSCOPY (EGD);  Surgeon: Carlos Jones MD;  Location:  GI     GYN SURGERY  9/2010    D&C     SOFT TISSUE SURGERY  1976    removal of ganglion cyst, right hand     STRESS ECHO (METRO)  6/06    in [facility]     TONSILLECTOMY & ADENOIDECTOMY      T & A       Past Medical History:   Past Medical History:   Diagnosis Date     Abnormal Pap smear of cervix 02/22/2021 02/22/21     Allergic rhinitis     multiple chemical sensitivities     Anal fissure 11/20/2015     Anxiety disorder      Breast lump      Chemical sensitivity 09/14/2017    Multiple chemical sensitivities     Difficulty hearing 03/12/2012    Evaluated by audiology, mild, no hearing aids needed      Head injury 03/22/2013     Lactose intolerance      Mild intermittent asthma      Plantar tendonitis 11/20/2015     Uterine fibroid     multiple large fibroids on US 7/5/06. Not currently symptomatic.      Uterine fibroids     multiple large fibroids on US 7/5/06       Social History:    Social History     Tobacco Use     Smoking status: Never     Smokeless tobacco: Never   Substance Use Topics     Alcohol use: Yes     Comment: 1-2 drinks per month       Family History:   Family History   Problem Relation Age of Onset     C.A.D. Father      Lipids Father      Coronary Artery Disease Father         , age 52     Mental Illness Father      Gallbladder Disease Father         and mgm     Arthritis Mother      Hypertension Mother         , age 83     Thyroid Disease Mother      Arrhythmia Mother         atrial fibrulation     Cerebrovascular Disease Mother         atrial fibrulation     Cancer Paternal Grandmother         liver cancer     Breast Cancer Paternal Grandmother         , age 67     Mental Illness Paternal Grandmother      Lipids Sister      Lipids Sister      Hypertension Maternal Grandmother         , age 96     Hyperlipidemia Sister      Anesthesia Reaction Sister      Asthma Sister      Cerebrovascular Disease Other      Diabetes Other         maternal great grandma     Asthma Sister      Cerebrovascular Disease Other         maternal cousin     Diabetes Other        Allergies:   Allergies   Allergen Reactions     Aspirin Difficulty breathing     Contrast Dye Rash     Radha Flavor Swelling     All radha     Papaya Swelling     Cats      Itchy eyes     Dust Mites      Ibuprofen Sodium Difficulty breathing     Latex Rash     Mold      Stuffiness     Nsaids      Lung congestion     Peanuts [Nuts] Dermatitis     Peanuts, cashews and pistachios     Pollen Extract      Corn pollen and others     Chocolate      Ocular migraines     Corn-Related Products GI Disturbance     Diarrhea, abdominal pain corn pollen - asthma     Food GI Disturbance     Potatoes, peppers, tomatoes     Latex Rash     Prednisolone        Active Medications:   Current Outpatient Medications   Medication Sig Dispense Refill     bisacodyl (DULCOLAX) 5 MG EC tablet Take 2 tablets at 3 pm the day  before your procedure. If your procedure is before 11 am, take 2 additional tablets at 11 pm. If your procedure is after 11 am, take 2 additional tablets at 6 am. For additional instructions refer to your colonoscopy prep instructions. 4 tablet 0     Calcium-Magnesium-Vitamin D (CALCIUM MAGNESIUM PO)        Coenzyme Q10 (CO Q 10) 10 MG CAPS        COMPOUNDED NON-CONTROLLED SUBSTANCE (CMPD RX) - PHARMACY TO MIX COMPOUNDED MEDICATION Ibuprofen capsule 200 mg every 4 hours prn pain 100 each 1     fish oil-omega-3 fatty acids 1000 MG capsule        LUTEIN PO        polyethylene glycol (GOLYTELY) 236 g suspension The night before the exam at 6 pm drink an 8-ounce glass every 15 minutes until the jug is half empty. If you arrive before 11 AM: Drink the other half of the Golytely jug at 11 PM night before procedure. If you arrive after 11 AM: Drink the other half of the Golytely jug at 6 AM day of procedure. For additional instructions refer to your colonoscopy prep instructions. 4000 mL 0     bisacodyl (DULCOLAX) 5 MG EC tablet Take 2 tablets at 3 pm the day before your procedure. If your procedure is before 11 am, take 2 additional tablets at 11 pm. If your procedure is after 11 am, take 2 additional tablets at 6 am. For additional instructions refer to your colonoscopy prep instructions. 4 tablet 0     polyethylene glycol (GOLYTELY) 236 g suspension The night before the exam at 6 pm drink an 8-ounce glass every 15 minutes until the jug is half empty. If you arrive before 11 AM: Drink the other half of the Golytely jug at 11 PM night before procedure. If you arrive after 11 AM: Drink the other half of the Golytely jug at 6 AM day of procedure. For additional instructions refer to your colonoscopy prep instructions. 4000 mL 0       Systemic Review:   CONSTITUTIONAL: NEGATIVE for fever, chills, change in weight  ENT/MOUTH: NEGATIVE for ear, mouth and throat problems  RESP: NEGATIVE for significant cough or SOB  CV:  "NEGATIVE for chest pain, palpitations or peripheral edema    Physical Examination:   Vital Signs: /77 (BP Location: Right arm)   Pulse 71   Temp 99.3  F (37.4  C) (Temporal)   Resp 18   Ht 1.638 m (5' 4.5\")   Wt 83.9 kg (185 lb)   LMP 03/01/2008   SpO2 96%   BMI 31.26 kg/m    GENERAL: healthy, alert and no distress  NECK: no adenopathy, no asymmetry, masses, or scars  RESP: lungs clear to auscultation - no rales, rhonchi or wheezes  CV: regular rate and rhythm, normal S1 S2, no S3 or S4, no murmur, click or rub, no peripheral edema and peripheral pulses strong  ABDOMEN: soft, nontender, no hepatosplenomegaly, no masses and bowel sounds normal  MS: no gross musculoskeletal defects noted, no edema    Plan: Appropriate to proceed as scheduled.      Romain Denney MD  2/28/2023    PCP:  Perla Garsia    "

## 2023-02-28 NOTE — ANESTHESIA PREPROCEDURE EVALUATION
Anesthesia Pre-Procedure Evaluation    Patient: Nenita Goins   MRN: [id number] : 1955        Procedure : Procedure(s):  COLONOSCOPY          Past Medical History:   Diagnosis Date     Abnormal Pap smear of cervix 2021     Allergic rhinitis     multiple chemical sensitivities     Anal fissure 2015     Anxiety disorder      Breast lump      Chemical sensitivity 2017    Multiple chemical sensitivities     Difficulty hearing 2012    Evaluated by audiology, mild, no hearing aids needed      Head injury 2013     Lactose intolerance      Mild intermittent asthma      Plantar tendonitis 2015     Uterine fibroid     multiple large fibroids on US 06. Not currently symptomatic.      Uterine fibroids     multiple large fibroids on US 06      Past Surgical History:   Procedure Laterality Date     COLONOSCOPY  11     ESOPHAGOSCOPY, GASTROSCOPY, DUODENOSCOPY (EGD), COMBINED  2013    Procedure: COMBINED ESOPHAGOSCOPY, GASTROSCOPY, DUODENOSCOPY (EGD), BIOPSY SINGLE OR MULTIPLE;  COMBINED ESOPHAGOSCOPY, GASTROSCOPY, DUODENOSCOPY (EGD);  Surgeon: Carlso Jones MD;  Location:  GI     GYN SURGERY  2010    D&C     SOFT TISSUE SURGERY      removal of ganglion cyst, right hand     STRESS ECHO ([facility])      in [place] [facility]     TONSILLECTOMY & ADENOIDECTOMY      T & A      Allergies   Allergen Reactions     Aspirin Difficulty breathing     Contrast Dye Rash     Radha Flavor Swelling     All radha     Papaya Swelling     Cats      Itchy eyes     Dust Mites      Ibuprofen Sodium Difficulty breathing     Latex Rash     Mold      Stuffiness     Nsaids      Lung congestion     Peanuts [Nuts] Dermatitis     Peanuts, cashews and pistachios     Pollen Extract      Corn pollen and others     Chocolate      Ocular migraines     Corn-Related Products GI Disturbance     Diarrhea, abdominal pain corn pollen - asthma     Food GI Disturbance     Potatoes, peppers,  tomatoes     Latex Rash     Prednisolone       Social History     Tobacco Use     Smoking status: Never     Smokeless tobacco: Never   Substance Use Topics     Alcohol use: Yes     Comment: 1-2 drinks per month      Wt Readings from Last 1 Encounters:   02/28/23 83.9 kg (185 lb)        Anesthesia Evaluation            ROS/MED HX  ENT/Pulmonary:     (+) allergic rhinitis, asthma     Neurologic:       Cardiovascular:     (+) Dyslipidemia -----    METS/Exercise Tolerance:     Hematologic:       Musculoskeletal:       GI/Hepatic:     (+) GERD,     Renal/Genitourinary:       Endo:       Psychiatric/Substance Use:     (+) psychiatric history anxiety     Infectious Disease:       Malignancy:       Other:            Physical Exam    Airway  airway exam normal      Mallampati: II   TM distance: > 3 FB   Neck ROM: full   Mouth opening: > 3 cm    Respiratory Devices and Support         Dental       (+) Completely normal teeth      Cardiovascular          Rhythm and rate: regular and normal     Pulmonary   pulmonary exam normal        breath sounds clear to auscultation           OUTSIDE LABS:  CBC:   Lab Results   Component Value Date    WBC 6.1 03/22/2021    WBC 6.8 12/01/2020    HGB 14.9 03/22/2021    HGB 13.9 12/01/2020    HCT 43.6 03/22/2021    HCT 41.8 12/01/2020     03/22/2021     12/01/2020     BMP:   Lab Results   Component Value Date     03/22/2021     12/01/2020    POTASSIUM 3.8 03/22/2021    POTASSIUM 4.1 12/01/2020    CHLORIDE 109 03/22/2021    CHLORIDE 109 12/01/2020    CO2 29 03/22/2021    CO2 26 12/01/2020    BUN 14 03/22/2021    BUN 18 12/01/2020    CR 0.87 03/22/2021    CR 0.87 12/01/2020    GLC 94 03/22/2021     (H) 12/01/2020     COAGS: No results found for: PTT, INR, FIBR  POC: No results found for: BGM, HCG, HCGS  HEPATIC:   Lab Results   Component Value Date    ALBUMIN 4.2 03/22/2021    PROTTOTAL 7.7 03/22/2021    ALT 36 03/22/2021    AST 19 03/22/2021    GGT 24 02/11/2016     ALKPHOS 51 03/22/2021    BILITOTAL 0.6 03/22/2021     OTHER:   Lab Results   Component Value Date    A1C 5.5 01/09/2016    ANTONIO 9.1 03/22/2021    LIPASE 127 03/20/2003    TSH 1.95 03/22/2021    SED 9 12/01/2020       Anesthesia Plan    ASA Status:  2   NPO Status:  NPO Appropriate    Anesthesia Type: MAC.     - Reason for MAC: immobility needed, straight local not clinically adequate   Induction: Intravenous.   Maintenance: TIVA.        Consents    Anesthesia Plan(s) and associated risks, benefits, and realistic alternatives discussed. Questions answered and patient/representative(s) expressed understanding.    - Discussed:     - Discussed with:  Patient      - Extended Intubation/Ventilatory Support Discussed: No.      - Patient is DNR/DNI Status: No    Use of blood products discussed: No .     Postoperative Care    Pain management: IV analgesics, Oral pain medications, Multi-modal analgesia.   PONV prophylaxis: Ondansetron (or other 5HT-3), Background Propofol Infusion     Comments:                Juan Chapa MD

## 2023-02-28 NOTE — ANESTHESIA POSTPROCEDURE EVALUATION
Patient: Nenita Goins    Procedure: Procedure(s):  COLONOSCOPY       Anesthesia Type:  MAC    Note:  Disposition: Outpatient   Postop Pain Control: Uneventful            Sign Out: Well controlled pain   PONV: No   Neuro/Psych: Uneventful            Sign Out: Acceptable/Baseline neuro status   Airway/Respiratory: Uneventful            Sign Out: Acceptable/Baseline resp. status   CV/Hemodynamics: Uneventful            Sign Out: Acceptable CV status   Other NRE: NONE   DID A NON-ROUTINE EVENT OCCUR? No           Last vitals:  Vitals Value Taken Time   /77 02/28/23 1110   Temp 36.2  C (97.2  F) 02/28/23 1038   Pulse     Resp 20 02/28/23 1110   SpO2 99 % 02/28/23 1110       Electronically Signed By: Juan Chapa MD  February 28, 2023  1:39 PM

## 2023-03-01 ENCOUNTER — VIRTUAL VISIT (OUTPATIENT)
Dept: FAMILY MEDICINE | Facility: CLINIC | Age: 68
End: 2023-03-01
Payer: COMMERCIAL

## 2023-03-01 DIAGNOSIS — R00.2 PALPITATIONS: ICD-10-CM

## 2023-03-01 DIAGNOSIS — G93.32 CFS (CHRONIC FATIGUE SYNDROME): Primary | ICD-10-CM

## 2023-03-01 DIAGNOSIS — F41.1 GAD (GENERALIZED ANXIETY DISORDER): ICD-10-CM

## 2023-03-01 PROCEDURE — 99215 OFFICE O/P EST HI 40 MIN: CPT | Mod: VID | Performed by: FAMILY MEDICINE

## 2023-03-01 NOTE — PROGRESS NOTES
Nenita is a 67 year old who is being evaluated via a billable video visit.      How would you like to obtain your AVS? MyChart  If the video visit is dropped, the invitation should be resent by: Text to cell phone: [phone]  Will anyone else be joining your video visit? No          Assessment & Plan     CFS (chronic fatigue syndrome)  Discussed that I believe she has chronic fatigue syndrome, that this is a chronic condition with no specific treatment, but being aware of the syndrome and its limitations can be helpful in managing symptoms.  We discussed the diagnosis and I encouraged her to consider the sleep consult as an underlying sleep disorder could cause or contribute to her symptoms. I will send her the [email or web address] patient info sheet on CFS.    Palpitations  Reassured patient that Ziopatch showed no worrisome arrhythmias.  I will send her a copy of the report.    THOMAS (generalized anxiety disorder)  Her longstanding therapist is no longer accepting Medicare patients and she is looking for a new therapist.  I recommended she see Lady Leggett, Behavioral Health Consultant at [facility], who may be able to help her find a therapist for her anxiety who may also have experience with CFS/Fibromyalgia patients.        I spent a total of 49 minutes on the day of the visit.  39 minutes spend on video call.   Time spent doing chart review, history and exam, documentation and further activities per the note        No follow-ups on file.    Perla Garsia MD  [facility]        Subjective   Nenita is a 67 year old accompanied by Nenita Goins's spouse Susu, presenting for the following health issues:  Heart Problem      Heart Problem    History of Present Illness       Reason for visit:  Follow-up on results of heart monitor    Nenita Goins eats 4 or more servings of fruits and vegetables daily.Nenita Goins consumes 2 sweetened beverage(s)  "daily.Nenita Goins exercises with enough effort to increase Nenita Goins's heart rate 9 or less minutes per day.  Nenita Goins exercises with enough effort to increase Nenita Goins's heart rate 3 or less days per week.   Nenita Goins is taking medications regularly.      She'd like to discuss her recent Ziopatch results.  I saw her for palpitations in January and ordered the Ziopatch then.  She wore it for 10 days and it showed NSR with one 7-beat run of SVT.  All of her palpitation symptoms were associated with NSR.    She reports she is feeling better this past month but she still notes ongoing fatigue and post-exertional malaise. She describes the exertional malaise as suddenly \"hitting a wall\" and she can't move any more - needs to rest immediately.  Sometimes she can walk for a bit but sometimes she hits the wall just walking down the hallway of her condo.      She's had these symptoms since she got her first COVID vaccine 2 years ago.  She did complete the Moderna primary series and got one Moderna booster in 11/2021.        We've completed fatigue work-up as previously documented.  The only missing step of that work-up is the sleep consult.  Her bed-partner (Susu) reports no snoring or apnea episodes.    Associated symptoms include palpitations, \"fuzz brain,\" and awakening feeling unrefreshed, anxiety.  She also carries a diagnosis of fibromyalgia and chemical sensitivity.      She notes that her current symptoms are reminiscent of when she had mono at age 29.      Review of Systems         Objective           Vitals:  No vitals were obtained today due to virtual visit.    Physical Exam   GENERAL: Healthy, alert and no distress  EYES: Eyes grossly normal to inspection.  No discharge or erythema, or obvious scleral/conjunctival abnormalities.  RESP: No audible wheeze, cough, or visible cyanosis.  No visible retractions or increased work of breathing.    SKIN: Visible skin clear. No significant " rash, abnormal pigmentation or lesions.  NEURO: Cranial nerves grossly intact.  Mentation and speech appropriate for age.  PSYCH: Mentation appears normal, affect normal/bright, judgement and insight intact, normal speech and appearance well-groomed.          Video-Visit Details    Type of service:  Video Visit   Video Start Time: 9:29 AM  Video End Time:10:08 AM    Originating Location (pt. Location): Home  Distant Location (provider location):  Off-site  Platform used for Video Visit: [employer]

## 2023-03-03 ENCOUNTER — VIRTUAL VISIT (OUTPATIENT)
Dept: BEHAVIORAL HEALTH | Facility: CLINIC | Age: 68
End: 2023-03-03
Payer: COMMERCIAL

## 2023-03-03 DIAGNOSIS — F41.1 GAD (GENERALIZED ANXIETY DISORDER): Primary | ICD-10-CM

## 2023-03-03 PROCEDURE — 90837 PSYTX W PT 60 MINUTES: CPT | Mod: VID | Performed by: SOCIAL WORKER

## 2023-03-06 NOTE — PROGRESS NOTES
[facility] Primary Care: Integrated Behavioral Health  March 3, 2023     Disclaimer: This note consists of symbols derived from keyboarding, dictation and/or voice recognition software. As a result, there may be errors in the script that have gone undetected. Please consider this when interpreting information found in this chart    Behavioral Health Clinician Progress Note    Patient Name: Nenita Goins           Service Type:  Individual      Service Location:   Face to Face in Home / Community     Session Start Time: 930am  Session End Time: 1030am      Session Length: 53 - 60      Attendees: Client     Service Modality:  Video Visit:      Provider verified identity through the following two step process.  Patient provided:  Patient photo and Patient     Telemedicine Visit: The patient's condition can be safely assessed and treated via synchronous audio and visual telemedicine encounter.      Reason for Telemedicine Visit: Patient has requested telehealth visit    Originating Site (Patient Location): Patient's home    Distant Site (Provider Location): [facility]    Consent:  The patient/guardian has verbally consented to: the potential risks and benefits of telemedicine (video visit) versus in person care; bill my insurance or make self-payment for services provided; and responsibility for payment of non-covered services.     Patient would like the video invitation sent by:  Send to e-mail at: [email or web address]    Mode of Communication:  Video Conference via [employer]    Distant Location (Provider):  On-site    As the provider I attest to compliance with applicable laws and regulations related to telemedicine.    Visit Activities (Refresh list every visit): NEW    Diagnostic Assessment Date:  Treatment Plan Date:  PROMIS (reviewed every 90 days):     Assessments:  The following assessments were completed by patient for this visit:  PHQ9:   PHQ-9  SCORE 3/9/2012 4/30/2014 3/10/2021   PHQ-9 Total Score 4 9 -   PHQ-9 Total Score MyChart - - 6 (Mild depression)   PHQ-9 Total Score - - 6     GAD7:   THOMAS-7 SCORE 4/30/2014 3/10/2021 1/18/2023   Total Score 12 - -   Total Score - 4 (minimal anxiety) -   Total Score - 4 5     CAGE-AID:   CAGE-AID Total Score 3/1/2023   Total Score 0   Total Score MyChart 0 (A total score of 2 or greater is considered clinically significant)     PROMIS 10-Global Health (only subscores and total score): No flowsheet data found.  [place] Suicide Severity Rating Scale (Lifetime/Recent)No flowsheet data found.  Previous PHQ-9:   PHQ-9 SCORE 3/9/2012 4/30/2014 3/10/2021   PHQ-9 Total Score 4 9 -   PHQ-9 Total Score MyChart - - 6 (Mild depression)   PHQ-9 Total Score - - 6     Previous THOMAS-7:   THOMAS-7 SCORE 4/30/2014 3/10/2021 1/18/2023   Total Score 12 - -   Total Score - 4 (minimal anxiety) -   Total Score - 4 5     THOMAS-7   [employer], 2002; Used with Permission) 4/30/2014 3/10/2021 1/18/2023   Over the last 2 weeks, how often have you been bothered by feeling nervous, anxious or on edge? 2=More than half the days - -   Over the last 2 weeks, how often have you been bothered by not being able to stop or control worrying? 2=More than half the days - -   Over the last 2 weeks, how often have you been bothered by worrying too much about different things? 2=More than half the days - -   Over the last 2 weeks, how often have you been bothered by trouble relaxing? 1=Several days - -   Over the last 2 weeks, how often have you been bothered by being so restless that it is hard to sit still? 1=Several days - -   Over the last 2 weeks, how often have you been bothered by becoming easily annoyed or irritable? 2=More than half the days - -   Over the last 2 weeks, how often have you been bothered by feeling afraid as if something awful might happen? 2=More than half the days - -   THOMAS-7 Total Score =  12 - -   1. Feeling nervous, anxious, or on edge  - Several days -   2. Not being able to stop or control worrying - Not at all -   3. Worrying too much about different things - Several days -   4. Trouble relaxing - Not at all -   5. Being so restless that it is hard to sit still - Not at all -   6. Becoming easily annoyed or irritable - Several days -   7. Feeling afraid, as if something awful might happen - Several days -   THOMAS 7 TOTAL SCORE - 4 (minimal anxiety) -   1. Feeling nervous, anxious, or on edge - 1 0   2. Not being able to stop or control worrying - 0 3   3. Worrying too much about different things - 1 0   4. Trouble relaxing - 0 0   5. Being so restless that it is hard to sit still - 0 0   6. Becoming easily annoyed or irritable - 1 1   7. Feeling afraid, as if something awful might happen - 1 1   THOMAS-7 Total Score - 4 5   If you checked any problems, how difficult have they made it for you to do your work, take care of things at home, or get along with other people? - - Somewhat difficult     PHQ-9 ([employer]) 3/9/2012 4/30/2014 3/10/2021   No Interest In Doing Things 0 1 -   Feeling Depressed 1 2 -   Trouble Sleeping 2 2 -   Tired / No Energy 0 1 -   No appetite or Over-Eating 0 0 -   Feeling Bad about Self 1 1 -   Trouble Concentrating 0 1 -   Moving Slow or Restless 0 1 -   Suicidal Thoughts 0 0 -   Total Score 4 9 -   1.  Little interest or pleasure in doing things - - 0   2.  Feeling down, depressed, or hopeless - - 0   3.  Trouble falling or staying asleep, or sleeping too much - - 1   4.  Feeling tired or having little energy - - 3   5.  Poor appetite or overeating - - 0   6.  Feeling bad about yourself - - 1   7.  Trouble concentrating - - 1   8.  Moving slowly or restless - - 0   9.  Suicidal or self-harm thoughts - - 0   PHQ-9 Total Score - - 6   1.  Little interest or pleasure in doing things - - Not at all   2.  Feeling down, depressed, or hopeless - - Not at all   3.  Trouble falling or staying asleep, or sleeping too much - - Several  days   4.  Feeling tired or having little energy - - Nearly every day   5.  Poor appetite or overeating - - Not at all   6.  Feeling bad about yourself - - Several days   7.  Trouble concentrating - - Several days   8.  Moving slowly or restless - - Not at all   9.  Suicidal or self-harm thoughts - - Not at all   PHQ-9 via [employer] TOTAL SCORE-----> - - 6 (Mild depression)   Difficulty at work, home, or with people - - Somewhat difficult       BRIDGETTE LEVEL:  BRIDGETTE Score (Last Two) 3/30/2011   BRIDGETTE Raw Score 38   Activation Score 52.9   BRIDGETTE Level 2       DATA  Extended Session (60+ minutes): PROLONGED SERVICE IN THE OUTPATIENT SETTING REQUIRING DIRECT (FACE-TO-FACE) PATIENT CONTACT BEYOND THE USUAL SERVICE:    - Patient's presenting concerns require more intensive intervention than could be completed within the usual service  Interactive Complexity: No  Crisis: No  [facility] Patient: No    Treatment Objective(s) Addressed in This Session:    Depressed Mood: Increase interest, engagement, and pleasure in doing things  Feel less tired and more energy during the day     Current Stressors / Issues:     Patient met with her primary care physician on March 1, 2023 and reported below concerns.  Patient was referred to the [facility] for support in finding a new long-term therapist.  Patient's current therapist is no longer accepting Medicare.  Assessment & Plan         CFS (chronic fatigue syndrome)  Discussed that I believe she has chronic fatigue syndrome, that this is a chronic condition with no specific treatment, but being aware of the syndrome and its limitations can be helpful in managing symptoms.  We discussed the diagnosis and I encouraged her to consider the sleep consult as an underlying sleep disorder could cause or contribute to her symptoms. I will send her the [employer] patient info sheet on CFS.     Palpitations  Reassured patient that Ziopatch showed no worrisome arrhythmias.  I will send her a copy of the report.     THOMAS  "(generalized anxiety disorder)  Her longstanding therapist is no longer accepting Medicare patients and she is looking for a new therapist.  I recommended she see Lady Leggett, Behavioral Health Consultant at [facility], who may be able to help her find a therapist for her anxiety who may also have experience with CFS/Fibromyalgia patients  She reports she is feeling better this past month but she still notes ongoing fatigue and post-exertional malaise. She describes the exertional malaise as suddenly \"hitting a wall\" and she can't move any more - needs to rest immediately.  Sometimes she can walk for a bit but sometimes she hits the wall just walking down the hallway of her condo.      Patient is a pleasant 67-year-old female who reports a long history of social anxiety and more recently low energy and anhedonia.  Patient attributes the last symptoms exacerbated by recent diagnosis of chronic fatigue syndrome and fibromyalgia.  However, reports her low energy and desire to sleep predated her medical conditions.  Patient reports she used to enjoy going for walks with her partner or work in the garden has difficulty walking 1 block.  Patient reports it feels easier to stay home and isolate herself.  She reports she workedgood work with clinical , Suresh Mejía for several years but she is no longer accepting Medicare.  Patient reports she first met Perfecto sáncheze after the loss of her father 8 years ago.  Patient notes she had been meeting once a month in the past year.    During the session, patient stated she wanted to focus on her critical awareness.  Patient reports  she becomes easily upset in conversations with others.  Pt reports she likes to focus on facts and has a depth of knowledge on most subjects.  [facility] had shared different information and patient was quick to point out facts of her knowledge.  Patient adds she realizes she needs a lot of affirmation from " "others.    Patient shared that she questions if she is on the autism spectrum.  Patient reported a history of struggling socially in school.  Patient always found it difficult to be accepted and understood  by others.  He reports part of that was due to her father being a  and they moved frequently.  Patient added did not have a lot of money and often had a hand-me-down close which led to a lot of teasing.  Patient added she often babysits for her 7-year-old grandsoner who is diagnosed with autism.  Pt reports \"I get him\".   Patient reports her grandson has difficulty with transitions similar to herself.  Patient reports she prefers a lot of alone time.  Patient has she recognizes her insecurities and will begin to question if her partner Lisa wants her around.    Plan    Patient found initial session helpful and agreed to follow up with the PHP in 1 month.  Patient declined referral for formal neuropsychological evaluation for autism.    Progress on Treatment Objective(s) / Homework:  Minimal progress - PREPARATION (Decided to change - considering how); Intervened by negotiating a change plan and determining options / strategies for behavior change, identifying triggers, exploring social supports, and working towards setting a date to begin behavior change    Motivational Interviewing    MI Intervention: Permission to raise concern or advise, Open-ended questions and Reflections: simple and complex     Change Talk Expressed by the Patient: Reasons to change    Provider Response to Change Talk: E - Evoked more info from patient about behavior change, A - Affirmed patient's thoughts, decisions, or attempts at behavior change, R - Reflected patient's change talk and S - Summarized patient's change talk statements    PSYCHODYMANIC PSYCHOTHERAPY: Discussed patient's emotional dynamics and issues and how they impact behaviors,Explored patient's history of relationships and how they impact present behaviors, " Explored how to work with and make changes in these schemas and patterns  ACCEPTANCE AND COMMITMENT THERAPY: Explored and identified important values in patient's life, Discussed ways to commit to behavioral activation around these values    Care Plan review completed: No    Medication Review:  No current psychiatric medications prescribed    Medication Compliance:  NA    Changes in Health Issues:   None reported    Chemical Use Review:   Substance Use: Chemical use reviewed, no active concerns identified      Tobacco Use: No current tobacco use.      Assessment: Current Emotional / Mental Status (status of significant symptoms):  Risk status (Self / Other harm or suicidal ideation)  Patient denies a history of suicidal ideation, suicide attempts, self-injurious behavior, homicidal ideation, homicidal behavior and and other safety concerns  Patient denies current fears or concerns for personal safety.  Patient denies current or recent suicidal ideation or behaviors.  Patient denies current or recent homicidal ideation or behaviors.  Patient denies current or recent self injurious behavior or ideation.  Patient denies other safety concerns.  A safety and risk management plan has not been developed at this time, however patient was encouraged to call [place] [phone] should there be a change in any of these risk factors.        Appearance:   Appropriate   Eye Contact:   Good   Psychomotor Behavior: Normal   Attitude:   Cooperative  Attentive  Orientation:   All  Speech   Rate / Production: Normal    Volume:  Normal   Mood:    Normal  Affect:    Appropriate  Bright  Expansive   Thought Content:  Clear   Thought Form:  Coherent  Goal Directed  Logical   Insight:    Good  and Fair     Diagnoses:  1. THOMAS (generalized anxiety disorder)        Collateral Reports Completed:  Routed note to Care Team Member(s)    Plan: (Homework, other):  Patient was given information about behavioral services and encouraged to schedule a  follow up appointment with the clinic [facility] [place]   in 1 month.   information about mental health symptoms and treatment options  and information on ACT values exercise. Nenita Goins CD Recommendations: Maintain Sobriety.       KORY Leggett, [facility] [place]

## 2023-03-16 ENCOUNTER — VIRTUAL VISIT (OUTPATIENT)
Dept: FAMILY MEDICINE | Facility: CLINIC | Age: 68
End: 2023-03-16
Payer: COMMERCIAL

## 2023-03-16 ENCOUNTER — NURSE TRIAGE (OUTPATIENT)
Dept: FAMILY MEDICINE | Facility: CLINIC | Age: 68
End: 2023-03-16

## 2023-03-16 DIAGNOSIS — B02.9 HERPES ZOSTER WITHOUT COMPLICATION: Primary | ICD-10-CM

## 2023-03-16 PROCEDURE — 99213 OFFICE O/P EST LOW 20 MIN: CPT | Mod: VID | Performed by: FAMILY MEDICINE

## 2023-03-16 RX ORDER — VALACYCLOVIR HYDROCHLORIDE 1 G/1
1000 TABLET, FILM COATED ORAL 3 TIMES DAILY
Qty: 21 TABLET | Refills: 0 | Status: SHIPPED | OUTPATIENT
Start: 2023-03-16 | End: 2023-04-10

## 2023-03-16 NOTE — TELEPHONE ENCOUNTER
"Pt calling to report what she believes may be onset of shingles on the L side of her face.  Reports a headache, L earache, and increased L eye irritation two days ago, with the appearance of a \"shiny, bubbly rash\" at the hairline today, \"about 3\" from my eye,\" along with pain on the L side of face.   No fever, no vision change, no weakness or droop.  Pt is concerned for spread to her eye.  States no known trigger other than her CFS has been \"particularly challenging\" for the past two weeks.    Given a LARON with Dr. Garsia today.    Lucie SADLER RN  [employer] [facility]        Reason for Disposition    Shingles rash (matches SYMPTOMS) and onset within past 72 hours    Additional Information    Negative: Difficult to awaken or acting confused (e.g., disoriented, slurred speech)    Negative: Sounds like a life-threatening emergency to the triager    Negative: Localized rash and doesn't match the SYMPTOMS of shingles    Negative: Back pain and doesn't match the SYMPTOMS of shingles    Negative: Shingles Vaccine (Recombinant Zoster Vaccine; RZV; Shingrix), questions about    Negative: Shingles rash of face and eye pain or blurred vision    Negative: Shingles rash on the eyelid or tip of the nose    Negative: Shingles rash and spots start appearing other places on body    Negative: Patient sounds very sick or weak to the triager    Negative: Shingles rash (matches SYMPTOMS) and weak immune system (e.g., HIV positive,  cancer chemotherapy, chronic steroid treatment, splenectomy) and NOT taking antiviral medication    Negative: Shingles rash of face and facial weakness    Negative: Shingles rash of face or ear and earache or ringing in the ear    Negative: Fever > 100.4 F (38.0 C)    Protocols used: SHINGLES-A-OH      "

## 2023-03-16 NOTE — PROGRESS NOTES
Nenita is a 67 year old who is being evaluated via a billable video visit.                Assessment & Plan     Herpes zoster without complication  Discussed etiology and expected course of shingles.  Gave patient ed handout.  Discussed the importance of starting antiviral ASAP.  Reviewed risk of post-herpetic neuralgia and secondary cellulitis, advising patient to notify me if she develops signs or symptoms of either.   - valACYclovir (VALTREX) 1000 mg tablet  Dispense: 21 tablet; Refill: 0      No follow-ups on file.    Perla Garsia MD  [facility]        Gabbie Gutierres is a 67 year old accompanied by Nenita Goins's spouse Susu, presenting for the following health issues:  No chief complaint on file.      History of Present Illness       Reason for visit:  Possible shingles  Symptom onset:  1-3 days ago  Symptoms include:  Rash, headache, left side of face pain  Symptom intensity:  Moderate  Symptom progression:  Worsening  Had these symptoms before:  No  What makes it worse:  No  What makes it better:  Sleeping, using soothing gel on rash    Nenita Goins eats 4 or more servings of fruits and vegetables daily.Nenita Goins consumes 2 sweetened beverage(s) daily.Nenita Goins exercises with enough effort to increase Nenita Goins's heart rate 9 or less minutes per day.  Nenita Goins exercises with enough effort to increase Nenita Goins's heart rate 3 or less days per week.   Nenita Goins is taking medications regularly.       Shingles  Pain started 3 days ago on her left temple  Rash started 2 days ago - cluster of blisters near her hairline on left forehead  Pain and swelling in front of left ear  Hurts to chew   Tingly pain in left eyebrow  No vision change  [place] lotion helps     Review of Systems         Objective           Vitals:  No vitals were obtained today due to virtual visit.    Physical Exam   GENERAL: Healthy, alert and no distress  EYES:  Eyes grossly normal to inspection.  No discharge or erythema, or obvious scleral/conjunctival abnormalities.  RESP: No audible wheeze, cough, or visible cyanosis.  No visible retractions or increased work of breathing.    SKIN: erythematous cluster of vesicles on left forehead near hair line.  NEURO: Cranial nerves grossly intact.  Mentation and speech appropriate for age.  PSYCH: Mentation appears normal, affect normal/bright, judgement and insight intact, normal speech and appearance well-groomed.          Video-Visit Details    Type of service:  Video Visit   Video Start Time: 4:22 PM  Video End Time:4:26 PM    Originating Location (pt. Location): Home  Distant Location (provider location):  Off-site  Platform used for Video Visit: [employer]

## 2023-03-21 ENCOUNTER — TELEPHONE (OUTPATIENT)
Dept: FAMILY MEDICINE | Facility: CLINIC | Age: 68
End: 2023-03-21

## 2023-03-21 NOTE — TELEPHONE ENCOUNTER
"S-(situation): seen for shingles about one week ago  She will be seeing her optometrist today in about one hour , it is a big bruise under the eye and puffy \"kind of whitish on the top of it\" \"made me worry it was in infection  No pain , no vision changes but eyelid all puffed up      B-(background): shingles started at hairline, down to forehead, to left ear then kind of thebride of nose and then the bridge of eyebrow and then\"south side of eye\"    Pt advised to see eye doctor first. If he feels she needs to see FP provider to call back here and we will get her appt or maybe send her to HEYDI Núñez, RN, BSN  [facility]       "

## 2023-03-22 ENCOUNTER — MYC MEDICAL ADVICE (OUTPATIENT)
Dept: FAMILY MEDICINE | Facility: CLINIC | Age: 68
End: 2023-03-22
Payer: COMMERCIAL

## 2023-03-22 DIAGNOSIS — M25.552 PAIN OF BOTH HIP JOINTS: ICD-10-CM

## 2023-03-22 DIAGNOSIS — M25.551 PAIN OF BOTH HIP JOINTS: ICD-10-CM

## 2023-03-24 NOTE — TELEPHONE ENCOUNTER
Dr. Garsia--    See pts message, wanting to ensure you are in agreement with extending valacyclovir that was recommended by her optometrist.    Marisela Barragan, EDUINN RN  [facility]

## 2023-03-27 NOTE — TELEPHONE ENCOUNTER
"Dr. Garsia-Please review patient's response and may close encounter.    \"No, she didn't. She told me things to watch for. Today, the rash around my eye is mostly gone.  Nenita\"    Thank you!  EDUIN CardonaN, RN-[facility]    "

## 2023-03-28 ENCOUNTER — TELEPHONE (OUTPATIENT)
Dept: FAMILY MEDICINE | Facility: CLINIC | Age: 68
End: 2023-03-28
Payer: COMMERCIAL

## 2023-03-28 NOTE — TELEPHONE ENCOUNTER
Call received from patient:  1. Dealing with Shingles for 2 weeks  2. Pain not controlled by Tylenol and concerned she has developed an ear infection in left ear   A. Would like to be evaluated    Informed patient no available office visits for in-person evaluation at the clinic today.  Recommended evaluation with [facility]: open today [id number], no appt necessary.  Reviewed new location/address.    Patient verbalized understanding and in agreement with plan.    EDUIN CardonaN, RN-BC  [facility]

## 2023-03-29 NOTE — TELEPHONE ENCOUNTER
PT is only wanting the dispense of #50.  Pended.  Would that effect the refill #? 1 or 3?    DONNA Holden

## 2023-04-10 ENCOUNTER — TELEPHONE (OUTPATIENT)
Dept: FAMILY MEDICINE | Facility: CLINIC | Age: 68
End: 2023-04-10

## 2023-04-10 ENCOUNTER — OFFICE VISIT (OUTPATIENT)
Dept: FAMILY MEDICINE | Facility: CLINIC | Age: 68
End: 2023-04-10
Payer: COMMERCIAL

## 2023-04-10 VITALS
RESPIRATION RATE: 20 BRPM | OXYGEN SATURATION: 97 % | HEART RATE: 70 BPM | SYSTOLIC BLOOD PRESSURE: 146 MMHG | DIASTOLIC BLOOD PRESSURE: 76 MMHG | TEMPERATURE: 99.5 F

## 2023-04-10 DIAGNOSIS — B02.29 POSTHERPETIC NEURALGIA: Primary | ICD-10-CM

## 2023-04-10 PROCEDURE — 99213 OFFICE O/P EST LOW 20 MIN: CPT | Performed by: FAMILY MEDICINE

## 2023-04-10 ASSESSMENT — PAIN SCALES - GENERAL: PAINLEVEL: NO PAIN (1)

## 2023-04-10 NOTE — PROGRESS NOTES
Assessment & Plan     Postherpetic neuralgia  Involvement of scalp makes using a topical (like lidocaine or capsaicin cream) challenging.  I recommended gabapentin and she would like to see if she can get this compounded at [facility] pharmacy due to her multiple chemical sensitivities.  We'll have her start with low dose of 100 mg QHS and build slowly from there up to 300 mg QHS as tolerated.  She can continue to use acetaminophen but I recommended a max dose of 2,000 mg per day if she is using that daily.  - COMPOUND CONTAINING CONTROLLED SUBSTANCE (CMPD RX) - PHARMACY TO MIX COMPOUNDED MEDICATION  Dispense: 90 capsule; Refill: 0    Perla Garsia MD  [facility] GALE Gutierres is a 67 year old, presenting for the following health issues:  Shingles        4/10/2023    11:23 AM   Additional Questions   Roomed by Kirsty WALTERS   Accompanied by Partner- Susu         4/10/2023    11:24 AM   Patient Reported Additional Medications   Patient reports taking the following new medications OTC Tylenol     History of Present Illness       Reason for visit:  Followup on shingles    Nenita Goins eats 4 or more servings of fruits and vegetables daily.Nenita Goins consumes 2 sweetened beverage(s) daily.Nenita Goins exercises with enough effort to increase Nenita Goins's heart rate 9 or less minutes per day.  Nenita Goins exercises with enough effort to increase Nenita Goins's heart rate 3 or less days per week.   Nenita Goins is taking medications regularly.     Continues to have Shingles pain of left scalp and forehead to tip of nose  She developed shingles a month ago (3/13/2023)  Lesions have healed/resolved but pain persists.  Using acetaminophen up to 3,000 mg per day which is helpful for a couple hours after each dose.  Has tried ibuprofen but it is not helpful        Review of Systems         Objective    BP (!) 146/76   Pulse 70   Temp 99.5  F  (37.5  C) (Temporal)   Resp 20   LMP 03/01/2008   SpO2 97%   There is no height or weight on file to calculate BMI.  Physical Exam   GEN:  no apparent distress  EYES: sclerae and conjunctivae clear with no discharge  ENT: external ears and nose without lesions or scars, TM's and canals clear bilaterally and oropharynx clear with moist mucus membranes and normal landmarks  SKIN: No lesions on face/scalp

## 2023-04-13 ENCOUNTER — MYC MEDICAL ADVICE (OUTPATIENT)
Dept: FAMILY MEDICINE | Facility: CLINIC | Age: 68
End: 2023-04-13
Payer: COMMERCIAL

## 2023-04-13 ENCOUNTER — TELEPHONE (OUTPATIENT)
Dept: FAMILY MEDICINE | Facility: CLINIC | Age: 68
End: 2023-04-13
Payer: COMMERCIAL

## 2023-04-13 NOTE — TELEPHONE ENCOUNTER
We could try Lyrica (pregabalin which is a gabapentin relative also used for neuropathic pain) but I'm not sure if she's up for trying something else if her post-herpetic neuralgia pain is better.  Let's see how it goes over the next few days.  She can let me know if she'd like to try the Lyrica.  Perla Garsia MD

## 2023-04-13 NOTE — TELEPHONE ENCOUNTER
See 4/13/23 nurse triage encounter.    Writer responded via [employer].      EDUIN CardonaN, RN-BC  [facility]

## 2023-04-13 NOTE — TELEPHONE ENCOUNTER
Pt called back and this RN spoke to her   See [employer] message of 4/13/2023 and her additional responses sent on to Dr Garsia    This encounter will be closed    Juani Núñez RN

## 2023-04-13 NOTE — TELEPHONE ENCOUNTER
"Per 4/13/23 [facility] encounter:  \"Hi Dr. Garsia     It appears I may be allergic to gabapentin. I stopped after the first dose just to be safe.     Symptoms were racing heart (noticed when I laid down to go to sleep, and prevented sleep), unexpected indigestion, severe itching on shingles rash, mild to moderate itching on other parts of my body, and when I finally fell asleep, I was awakened with coughing up phlegm and wheezing. Ironically, the nerve pain was gone and didn't come back until, presumably, the gabapentin wore off.     I haven't had another day as bad as Monday, except a stabbing headache this morning which succumbed to ibuprofin.  I'm going to see if I can manage things with the ibuprofin, but would welcome any thoughts.     Nenita Goins\"      Writer called patient's mobile number: Left message to call back and ask to speak with an available triage nurse.    Writer called patient's home number:     RAHAT Cardona, RN-[facility]    "

## 2023-04-13 NOTE — TELEPHONE ENCOUNTER
Dr Garsia,    Can you advise? Patient's comment/question copied below .     It appears I may be allergic to gabapentin. I stopped after the first dose just to be safe.     Symptoms were racing heart (noticed when I laid down to go to sleep, and prevented sleep), unexpected indigestion, severe itching on shingles rash, mild to moderate itching on other parts of my body, and when I finally fell asleep, I was awakened with coughing up phlegm and wheezing. Ironically, the nerve pain was gone and didn't come back until, presumably, the gabapentin wore off.     I haven't had another day as bad as Monday, except a stabbing headache this morning which succumbed to ibuprofin.  I'm going to see if I can manage things with the ibuprofin, but would welcome any thoughts.       RN spoke to pt:  This occurred Tuesday night overnight, cleared by morning  100 mg at bedtime - this was the first time she took it  She is feeling back to her normal self now except for the shingles neuralgia    Her shingles pain has improved, she is now thinking to give it a few more days to see what happens,     What do you advise?  Juani Núñez RN

## 2023-05-02 DIAGNOSIS — M25.552 PAIN OF BOTH HIP JOINTS: ICD-10-CM

## 2023-05-02 DIAGNOSIS — M25.551 PAIN OF BOTH HIP JOINTS: ICD-10-CM

## 2023-05-03 NOTE — TELEPHONE ENCOUNTER
Routing refill request to provider for review/approval because:  Drug not on the [facility] refill protocol     Last Written Prescription Date:  3/29/23  Last Fill Quantity: 50,  # refills: 0   Last office visit: 4/10/2023 with Ady  Future Office Visit:   Next 5 appointments (look out 90 days)    May 10, 2023  9:30 AM  (Arrive by 9:10 AM)  Provider Visit with Perla Garsia MD  [facility] [address] [place] [address]  [phone]         Lucie SADLER RN  [facility]

## 2023-05-10 ENCOUNTER — VIRTUAL VISIT (OUTPATIENT)
Dept: FAMILY MEDICINE | Facility: CLINIC | Age: 68
End: 2023-05-10
Payer: COMMERCIAL

## 2023-05-10 DIAGNOSIS — B02.29 POSTHERPETIC NEURALGIA: ICD-10-CM

## 2023-05-10 DIAGNOSIS — G44.89 OTHER HEADACHE SYNDROME: Primary | ICD-10-CM

## 2023-05-10 DIAGNOSIS — H81.10 BENIGN PAROXYSMAL POSITIONAL VERTIGO, UNSPECIFIED LATERALITY: ICD-10-CM

## 2023-05-10 PROCEDURE — 99213 OFFICE O/P EST LOW 20 MIN: CPT | Mod: VID | Performed by: FAMILY MEDICINE

## 2023-05-10 NOTE — PROGRESS NOTES
Nenita is a 67 year old who is being evaluated via a billable video visit.      How would you like to obtain your AVS? Mail a copy  If the video visit is dropped, the invitation should be resent by: Text to cell phone: [phone]  Will anyone else be joining your video visit? No          Assessment & Plan     Other headache syndrome  Postherpetic neuralgia  I renewed her ibuprofen but cautioned her to try to avoid using oral analgesics on a daily basis due to risk of developing analgesic-induced headache.  She'll continue to use ice/cool compresses to help with itching sensation    - COMPOUNDED NON-CONTROLLED SUBSTANCE (CMPD RX) - PHARMACY TO MIX COMPOUNDED MEDICATION  Dispense: 50 each; Refill: 2    Benign paroxysmal positional vertigo, unspecified laterality  She's had this previously and current flare seems to be improving.  Discussed option for vestibular rehab if this persists/worsens.      Perla Garsia MD  [facility]        Gabbie Gutierres is a 67 year old, presenting for the following health issues:  singles f/u        5/10/2023     9:27 AM   Additional Questions   Roomed by DONNA AHTFIELD         5/10/2023     9:29 AM   Patient Reported Additional Medications   Patient reports taking the following new medications Various herbal and vitamin supplements     History of Present Illness       Reason for visit:  Shingles follow-up    Nenita Goins eats 4 or more servings of fruits and vegetables daily.Nenita Goins consumes 1 sweetened beverage(s) daily.Nenita Goins exercises with enough effort to increase Nenita Goins's heart rate 9 or less minutes per day.  Nenita Goins exercises with enough effort to increase Nenita Goins's heart rate 3 or less days per week.   Nenita Goins is taking medications regularly.     Nenita was joined by her partner of Susu    Is now 8 weeks post-shingles and still struggling with PHN which seems to be improving/evolving.  Less facial  pain and more generalized headaches.  Ibuprofen helps with that.  Using lysine  Had a visual aura last week which she gets about once a year.  Not followed by headache.  The nerve pain in the distribution of her shingles has become more of an intense itching sensation.    Some positional vertigo when she lies down at night - improving  Lasts seconds.      Review of Systems         Objective           Vitals:  No vitals were obtained today due to virtual visit.    Physical Exam   GENERAL: Healthy, alert and no distress  EYES: Eyes grossly normal to inspection.  No discharge or erythema, or obvious scleral/conjunctival abnormalities.  RESP: No audible wheeze, cough, or visible cyanosis.  No visible retractions or increased work of breathing.    SKIN: Visible skin clear. No significant rash, abnormal pigmentation or lesions.  NEURO: Cranial nerves grossly intact.  Mentation and speech appropriate for age.  PSYCH: Mentation appears normal, affect normal/bright, judgement and insight intact, normal speech and appearance well-groomed.          Video-Visit Details    Type of service:  Video Visit   Video Start Time: 9:38 AM  Video End Time:9:58 AM  Originating Location (pt. Location): Home  Distant Location (provider location):  Off-site  Platform used for Video Visit: [employer]

## 2023-07-27 ENCOUNTER — OFFICE VISIT (OUTPATIENT)
Dept: FAMILY MEDICINE | Facility: CLINIC | Age: 68
End: 2023-07-27
Payer: COMMERCIAL

## 2023-07-27 VITALS
HEART RATE: 78 BPM | HEIGHT: 64 IN | SYSTOLIC BLOOD PRESSURE: 138 MMHG | DIASTOLIC BLOOD PRESSURE: 72 MMHG | BODY MASS INDEX: 31.88 KG/M2 | WEIGHT: 186.7 LBS | RESPIRATION RATE: 18 BRPM | OXYGEN SATURATION: 98 % | TEMPERATURE: 98.7 F

## 2023-07-27 DIAGNOSIS — H61.21 EXCESSIVE CERUMEN IN RIGHT EAR CANAL: ICD-10-CM

## 2023-07-27 DIAGNOSIS — Z00.00 ENCOUNTER FOR MEDICARE ANNUAL WELLNESS EXAM: Primary | ICD-10-CM

## 2023-07-27 DIAGNOSIS — R00.2 PALPITATIONS: ICD-10-CM

## 2023-07-27 PROCEDURE — 99213 OFFICE O/P EST LOW 20 MIN: CPT | Mod: 25 | Performed by: FAMILY MEDICINE

## 2023-07-27 PROCEDURE — 69209 REMOVE IMPACTED EAR WAX UNI: CPT | Mod: RT | Performed by: FAMILY MEDICINE

## 2023-07-27 PROCEDURE — G0439 PPPS, SUBSEQ VISIT: HCPCS | Mod: 25 | Performed by: FAMILY MEDICINE

## 2023-07-27 ASSESSMENT — ENCOUNTER SYMPTOMS
PALPITATIONS: 0
CHILLS: 0
JOINT SWELLING: 0
SHORTNESS OF BREATH: 1
NAUSEA: 0
HEARTBURN: 0
HEADACHES: 1
NERVOUS/ANXIOUS: 1
ARTHRALGIAS: 0
COUGH: 0
DYSURIA: 0
MYALGIAS: 0
BREAST MASS: 0
WEAKNESS: 1
FEVER: 1
SORE THROAT: 1
PARESTHESIAS: 1
DIARRHEA: 0
HEMATURIA: 0
DIZZINESS: 1
FREQUENCY: 0
HEMATOCHEZIA: 0
EYE PAIN: 1
CONSTIPATION: 0
ABDOMINAL PAIN: 0

## 2023-07-27 ASSESSMENT — ACTIVITIES OF DAILY LIVING (ADL)
CURRENT_FUNCTION: SHOPPING REQUIRES ASSISTANCE
CURRENT_FUNCTION: HOUSEWORK REQUIRES ASSISTANCE
CURRENT_FUNCTION: TRANSPORTATION REQUIRES ASSISTANCE

## 2023-07-27 ASSESSMENT — PAIN SCALES - GENERAL: PAINLEVEL: NO PAIN (0)

## 2023-07-27 NOTE — PATIENT INSTRUCTIONS
Patient Education   Personalized Prevention Plan  You are due for the preventive services outlined below.  Your care team is available to assist you in scheduling these services.  If you have already completed any of these items, please share that information with your care team to update in your medical record.  Health Maintenance Due   Topic Date Due     Osteoporosis Screening  Never done     Zoster (Shingles) Vaccine (1 of 2) Never done     Pneumococcal Vaccine (1 - PCV) Never done     COVID-19 Vaccine (4 - Moderna series) 01/24/2022     ANNUAL REVIEW OF HM ORDERS  07/29/2022     Your Health Risk Assessment indicates you feel you are not in good health    A healthy lifestyle helps keep the body fit and the mind alert. It helps protect you from disease, helps you fight disease, and helps prevent chronic disease (disease that doesn't go away) from getting worse. This is important as you get older and begin to notice twinges in muscles and joints and a decline in the strength and stamina you once took for granted. A healthy lifestyle includes good healthcare, good nutrition, weight control, recreation, and regular exercise. Avoid harmful substances and do what you can to keep safe. Another part of a healthy lifestyle is stay mentally active and socially involved.    Good healthcare   Have a wellness visit every year.   If you have new symptoms, let us know right away. Don't wait until the next checkup.   Take medicines exactly as prescribed and keep your medicines in a safe place. Tell us if your medicine causes problems.   Healthy diet and weight control   Eat 3 or 4 small, nutritious, low-fat, high-fiber meals a day. Include a variety of fruits, vegetables, and whole-grain foods.   Make sure you get enough calcium in your diet. Calcium, vitamin D, and exercise help prevent osteoporosis (bone thinning).   If you live alone, try eating with others when you can. That way you get a good meal and have company while you  "eat it.   Try to keep a healthy weight. If you eat more calories than your body uses for energy, it will be stored as fat and you will gain weight.     Recreation   Recreation is not limited to sports and team events. It includes any activity that provides relaxation, interest, enjoyment, and exercise. Recreation provides an outlet for physical, mental, and social energy. It can give a sense of worth and achievement. It can help you stay healthy.    Mental Exercise and Social Involvement  Mental and emotional health is as important as physical health. Keep in touch with friends and family. Stay as active as possible. Continue to learn and challenge yourself.   Things you can do to stay mentally active are:  Learn something new, like a foreign language or musical instrument.   Play SCRABBLE or do crossword puzzles. If you cannot find people to play these games with you at home, you can play them with others on your computer through the Internet.   Join a games club--anything from card games to chess or checkers or lawn bowling.   Start a new hobby.   Go back to school.   Volunteer.   Read.   Keep up with world events.  Learning About Being Physically Active  What is physical activity?     Being physically active means doing any kind of activity that gets your body moving.  The types of physical activity that can help you get fit and stay healthy include:  Aerobic or \"cardio\" activities. These make your heart beat faster and make you breathe harder, such as brisk walking, riding a bike, or running. They strengthen your heart and lungs and build up your endurance.  Strength training activities. These make your muscles work against, or \"resist,\" something. Examples include lifting weights or doing push-ups. These activities help tone and strengthen your muscles and bones.  Stretches. These let you move your joints and muscles through their full range of motion. Stretching helps you be more flexible.  Reaching a balance " between these three types of physical activity is important because each one contributes to your overall fitness.  What are the benefits of being active?  Being active is one of the best things you can do for your health. It helps you to:  Feel stronger and have more energy to do all the things you like to do.  Focus better at school or work.  Feel, think, and sleep better.  Reach and stay at a healthy weight.  Lose fat and build lean muscle.  Lower your risk for serious health problems, including diabetes, heart attack, high blood pressure, and some cancers.  Keep your heart, lungs, bones, muscles, and joints strong and healthy.  How can you make being active part of your life?  Start slowly. Make it your long-term goal to get at least 30 minutes of exercise on most days of the week. Walking is a good choice. You also may want to do other activities, such as running, swimming, cycling, or playing tennis or team sports.  Pick activities that you like--ones that make your heart beat faster, your muscles stronger, and your muscles and joints more flexible. If you find more than one thing you like doing, do them all. You don't have to do the same thing every day.  Get your heart pumping every day. Any activity that makes your heart beat faster and keeps it at that rate for a while counts.  Here are some great ways to get your heart beating faster:  Go for a brisk walk, run, or bike ride.  Go for a hike or swim.  Go in-line skating.  Play a game of touch football, basketball, or soccer.  Ride a bike.  Play tennis or racquetball.  Climb stairs.  Even some household chores can be aerobic--just do them at a faster pace. Vacuuming, raking or mowing the lawn, sweeping the garage, and washing and waxing the car all can help get your heart rate up.  Strengthen your muscles during the week. You don't have to lift heavy weights or grow big, bulky muscles to get stronger. Doing a few simple activities that make your muscles work  "against, or \"resist,\" something can help you get stronger.  For example, you can:  Do push-ups or sit-ups, which use your own body weight as resistance.  Lift weights or dumbbells or use stretch bands at home or in a gym or community center.  Stretch your muscles often. Stretching will help you as you become more active. It can help you stay flexible, loosen tight muscles, and avoid injury. It can also help improve your balance and posture and can be a great way to relax.  Be sure to stretch the muscles you'll be using when you work out. It's best to warm your muscles slightly before you stretch them. Walk or do some other light aerobic activity for a few minutes, and then start stretching.  When you stretch your muscles:  Do it slowly. Stretching is not about going fast or making sudden movements.  Don't push or bounce during a stretch.  Hold each stretch for at least 15 to 30 seconds, if you can. You should feel a stretch in the muscle, but not pain.  Breathe out as you do the stretch. Then breathe in as you hold the stretch. Don't hold your breath.  If you're worried about how more activity might affect your health, have a checkup before you start. Follow any special advice your doctor gives you for getting a smart start.  Where can you learn more?  Go to https://www.[employer].net/patiented  Enter W332 in the search box to learn more about \"Learning About Being Physically Active.\"  Current as of: October 10, 2022               Content Version: 13.7    5148-9843 [employer].   Care instructions adapted under license by your healthcare professional. If you have questions about a medical condition or this instruction, always ask your healthcare professional. [employer] disclaims any warranty or liability for your use of this information.      Activities of Daily Living    Your Health Risk Assessment indicates you have difficulties with activities of daily living such as housework, " bathing, preparing meals, taking medication, etc. Your provider addressed this with you at today's appointment.  Hearing Loss: Care Instructions  Overview     Hearing loss is a sudden or slow decrease in how well you hear. It can range from slight to profound. Permanent hearing loss can occur with aging. It also can happen when you are exposed long-term to loud noise. Examples include listening to loud music, riding motorcycles, or being around other loud machines.  Hearing loss can affect your work and home life. It can make you feel lonely or depressed. You may feel that you have lost your independence. But hearing aids and other devices can help you hear better and feel connected to others.  Follow-up care is a key part of your treatment and safety. Be sure to make and go to all appointments, and call your doctor if you are having problems. It's also a good idea to know your test results and keep a list of the medicines you take.  How can you care for yourself at home?  Avoid loud noises whenever possible. This helps keep your hearing from getting worse.  Always wear hearing protection around loud noises.  Wear a hearing aid as directed.  A professional can help you pick a hearing aid that will work best for you.  You can also get hearing aids over the counter for mild to moderate hearing loss.  Have hearing tests as your doctor suggests. They can show whether your hearing has changed. Your hearing aid may need to be adjusted.  Use other devices as needed. These may include:  Telephone amplifiers and hearing aids that can connect to a television, stereo, radio, or microphone.  Devices that use lights or vibrations. These alert you to the doorbell, a ringing telephone, or a baby monitor.  Television closed-captioning. This shows the words at the bottom of the screen. Most new TVs can do this.  TTY (text telephone). This lets you type messages back and forth on the telephone instead of talking or listening. These  "devices are also called TDD. When messages are typed on the keyboard, they are sent over the phone line to a receiving TTY. The message is shown on a monitor.  Use text messaging, social media, and email if it is hard for you to communicate by telephone.  Try to learn a listening technique called speechreading. It is not lipreading. You pay attention to people's gestures, expressions, posture, and tone of voice. These clues can help you understand what a person is saying. Face the person you are talking to, and have them face you. Make sure the lighting is good. You need to see the other person's face clearly.  Think about counseling if you need help to adjust to your hearing loss.  When should you call for help?  Watch closely for changes in your health, and be sure to contact your doctor if:    You think your hearing is getting worse.     You have new symptoms, such as dizziness or nausea.   Where can you learn more?  Go to https://www.[employer].net/patiented  Enter R798 in the search box to learn more about \"Hearing Loss: Care Instructions.\"  Current as of: March 1, 2023               Content Version: 13.7    6312-5819 [employer].   Care instructions adapted under license by your healthcare professional. If you have questions about a medical condition or this instruction, always ask your healthcare professional. [employer] disclaims any warranty or liability for your use of this information.      Your Health Risk Assessment indicates you feel you are not in good emotional health.    Recreation   Recreation is not limited to sports and team events. It includes any activity that provides relaxation, interest, enjoyment, and exercise. Recreation provides an outlet for physical, mental, and social energy. It can give a sense of worth and achievement. It can help you stay healthy.    Mental Exercise and Social Involvement  Mental and emotional health is as important as physical health. " Keep in touch with friends and family. Stay as active as possible. Continue to learn and challenge yourself.   Things you can do to stay mentally active are:  Learn something new, like a foreign language or musical instrument.   Play SCRABBLE or do crossword puzzles. If you cannot find people to play these games with you at home, you can play them with others on your computer through the Internet.   Join a games club--anything from card games to chess or checkers or lawn bowling.   Start a new hobby.   Go back to school.   Volunteer.   Read.   Keep up with world events.

## 2023-07-27 NOTE — PROGRESS NOTES
"SUBJECTIVE:   Nenita is a 68 year old who presents for Preventive Visit.      7/27/2023     1:17 PM   Additional Questions   Roomed by CLIFF Zacarias   Accompanied by self         7/27/2023     1:17 PM   Patient Reported Additional Medications   Patient reports taking the following new medications none       Are you in the first 12 months of your Medicare coverage?  No    Healthy Habits:     In general, how would you rate your overall health?  Fair    Frequency of exercise:  None    Do you usually eat at least 4 servings of fruit and vegetables a day, include whole grains    & fiber and avoid regularly eating high fat or \"junk\" foods?  Yes    Taking medications regularly:  Not Applicable    Medication side effects:  Not applicable    Ability to successfully perform activities of daily living:  Transportation requires assistance, shopping requires assistance and housework requires assistance    Home Safety:  No safety concerns identified    Hearing Impairment:  Difficulty following a conversation in a noisy restaurant or crowded room, feel that people are mumbling or not speaking clearly, difficulty following dialogue in the theater, difficult to understand a speaker at a public meeting or Baptism service, need to ask people to speak up or repeat themselves, find that men's voices are easier to understand than woman's, difficulty understanding soft or whispered speech and difficulty understanding speech on the telephone    In the past 6 months, have you been bothered by leaking of urine?  No    In general, how would you rate your overall mental or emotional health?  Fair    Additional concerns today:  Yes    Still having palpitations but occurring infrequently.  Ziopatch was normal.    Have you ever done Advance Care Planning? (For example, a Health Directive, POLST, or a discussion with a medical provider or your loved ones about your wishes): Yes, patient states has an Advance Care Planning document and will bring a " copy to the clinic.       Fall risk  Fallen 2 or more times in the past year?: No  Any fall with injury in the past year?: No    Cognitive Screening   1) Repeat 3 items (Leader, , Table)    2) Clock draw: NORMAL  3) 3 item recall: Recalls 2 objects   Results: NORMAL clock, 1-2 items recalled: COGNITIVE IMPAIRMENT LESS LIKELY    Mini-CogTM Copyright ALISHA Lizarraga. Licensed by the author for use in [facility]; reprinted with permission (soob@[facility]). All rights reserved.        Reviewed and updated as needed this visit by clinical staff   Tobacco  Allergies  Meds  Problems             Reviewed and updated as needed this visit by Provider    Allergies  Meds  Problems            Social History     Tobacco Use    Smoking status: Never    Smokeless tobacco: Never   Substance Use Topics    Alcohol use: Yes     Comment: 1-2 drinks per month             7/27/2023     1:07 PM   Alcohol Use   Prescreen: >3 drinks/day or >7 drinks/week? No     Do you have a current opioid prescription? No  Do you use any other controlled substances or medications that are not prescribed by a provider? None        Current providers sharing in care for this patient include:   Patient Care Team:  Perla Garsia MD as PCP - General (Family Practice)  Perla Garsia MD as Assigned PCP    The following health maintenance items are reviewed in Epic and correct as of today:  Health Maintenance   Topic Date Due    DEXA  Never done    ZOSTER IMMUNIZATION (1 of 2) Never done    Pneumococcal Vaccine: 65+ Years (1 - PCV) Never done    COVID-19 Vaccine (4 - Moderna series) 01/24/2022    ANNUAL REVIEW OF HM ORDERS  07/29/2022    INFLUENZA VACCINE (1) 09/01/2023    LIPID  10/14/2023    PAP FOLLOW-UP  02/22/2024    HPV FOLLOW-UP  02/22/2024    MEDICARE ANNUAL WELLNESS VISIT  07/27/2024    FALL RISK ASSESSMENT  07/27/2024    MAMMO SCREENING  10/24/2024    ADVANCE CARE PLANNING  07/27/2028    DTAP/TDAP/TD IMMUNIZATION (3 - Td or Tdap)  "07/29/2031    COLORECTAL CANCER SCREENING  02/28/2033    HEPATITIS C SCREENING  Completed    PHQ-2 (once per calendar year)  Completed    IPV IMMUNIZATION  Aged Out    MENINGITIS IMMUNIZATION  Aged Out    PAP  Discontinued     BP Readings from Last 3 Encounters:   07/27/23 138/72   04/10/23 (!) 146/76   02/28/23 128/77    Wt Readings from Last 3 Encounters:   07/27/23 84.7 kg (186 lb 11.2 oz)   02/28/23 83.9 kg (185 lb)   10/06/22 82.6 kg (182 lb)                    10/1/2022    10:18 AM 10/24/2022     1:01 PM   Breast CA Risk Assessment (FHS-7)   Do you have a family history of breast, colon, or ovarian cancer? Yes No / Unknown     Pertinent mammograms are reviewed under the imaging tab.    Review of Systems   Constitutional:  Positive for fever. Negative for chills.   HENT:  Positive for hearing loss and sore throat. Negative for congestion and ear pain.    Eyes:  Positive for pain. Negative for visual disturbance.   Respiratory:  Positive for shortness of breath. Negative for cough.    Cardiovascular:  Positive for chest pain. Negative for palpitations.   Gastrointestinal:  Negative for abdominal pain, constipation, diarrhea and nausea.   Genitourinary:  Negative for dysuria, frequency, genital sores, hematuria and urgency.   Musculoskeletal:  Negative for arthralgias, joint swelling and myalgias.   Skin:  Negative for rash.   Neurological:  Positive for dizziness, weakness and headaches.   Psychiatric/Behavioral:  The patient is nervous/anxious.      Residual left eye pain from Shingles      OBJECTIVE:   /72 (BP Location: Right arm, Patient Position: Sitting, Cuff Size: Adult Regular)   Pulse 78   Temp 98.7  F (37.1  C) (Temporal)   Resp 18   Ht 1.626 m (5' 4\")   Wt 84.7 kg (186 lb 11.2 oz)   LMP 03/01/2008   SpO2 98%   BMI 32.05 kg/m   Estimated body mass index is 32.05 kg/m  as calculated from the following:    Height as of this encounter: 1.626 m (5' 4\").    Weight as of this encounter: 84.7 kg " "(186 lb 11.2 oz).  Physical Exam  GENERAL APPEARANCE: healthy, alert and no distress  EYES: Eyes grossly normal to inspection, conjunctivae and sclerae normal  HENT: right ear canal filled with cerumen, left ear canal with partial occlusion from cerumen but TM is visualized and appears normal  NECK: no adenopathy, no asymmetry, masses, or scars and thyroid normal to palpation  RESP: lungs clear to auscultation - no rales, rhonchi or wheezes  CV: regular rate and rhythm, normal S1 S2, no S3 or S4, no murmur, click or rub, no peripheral edema   ABDOMEN: soft, nontender, no hepatosplenomegaly, no masses   MS: no musculoskeletal defects are noted and gait is age appropriate without ataxia  SKIN: no suspicious lesions or rashes  NEURO: Normal strength and tone, sensory exam grossly normal, mentation intact and speech normal  PSYCH: mentation appears normal and affect normal/bright         ASSESSMENT / PLAN:     Encounter for Medicare annual wellness exam  Reviewed/updated Health Maintenance.  She declines vaccines and declines bone density screening.      Palpitations  Improved but still present.  Ziopatch normal.  Given co-morbid CFS/Fibromyalgia this could represent POTS and she notes it does often occur with standing up.  With symptoms of GILMORE I'd like to do TTE.    - Echocardiogram Complete    Excessive cerumen in right ear canal  - MN REMOVAL IMPACTED CERUMEN IRRIGATION/LVG UNILAT      COUNSELING:  Reviewed preventive health counseling, as reflected in patient instructions      BMI:   Estimated body mass index is 32.05 kg/m  as calculated from the following:    Height as of this encounter: 1.626 m (5' 4\").    Weight as of this encounter: 84.7 kg (186 lb 11.2 oz).   Weight management plan: Discussed healthy diet and exercise guidelines  Wt Readings from Last 3 Encounters:   07/27/23 84.7 kg (186 lb 11.2 oz)   02/28/23 83.9 kg (185 lb)   10/06/22 82.6 kg (182 lb)        Nenita Goins reports that Nenita Goins has " never smoked. Nenita Goins has never used smokeless tobacco.      Appropriate preventive services were discussed with this patient, including applicable screening as appropriate for cardiovascular disease, diabetes, osteopenia/osteoporosis, and glaucoma.  As appropriate for age/gender, discussed screening for colorectal cancer, prostate cancer, breast cancer, and cervical cancer. Checklist reviewing preventive services available has been given to the patient.    Reviewed patients plan of care and provided an AVS. The Basic Care Plan (routine screening as documented in Health Maintenance) for Nenita meets the Care Plan requirement. This Care Plan has been established and reviewed with the Patient.    Perla Garsia MD  [facility]    Identified Health Risks:  I have reviewed Opioid Use Disorder and Substance Use Disorder risk factors and made any needed referrals. The patient was provided with suggestions to help Nenita Goins develop a healthy physical lifestyle.  Nenita Goins is at risk for lack of exercise and has been provided with information to increase physical activity for the benefit of Nenita Goins's well-being.  The patient was provided with written information regarding signs of hearing loss.  The patient was provided with suggestions to help Nenita Goins develop a healthy emotional lifestyle.

## 2023-07-27 NOTE — PROGRESS NOTES
RN ear assessment completed prior to ear irrigation. Otoscopic exam reveals cerumen present and irrigation advised in Right Ear Only. Post Ear Irrigation exam completed and cerumen plug removed, tympanic membrane intact, and no bleeding noted.  Pain assessment completed.     Patient tolerated procedure:  no - experienced the following symptoms:  dizziness and discomfort    Huddled with MARILEE Lilly NP for provider assessment of tympanic integrity post-ear wash and symptoms post-ear wash. Per Faye Lilly NP tympanic membrane ok and cleared to go.     Teresa Alonso RN  [facility]

## 2023-08-16 ENCOUNTER — TELEPHONE (OUTPATIENT)
Dept: CARDIOLOGY | Facility: CLINIC | Age: 68
End: 2023-08-16
Payer: COMMERCIAL

## 2023-08-16 NOTE — TELEPHONE ENCOUNTER
Health Call Center    Phone Message    May a detailed message be left on voicemail: yes     Reason for Call: Other: Patient is scheduled to have an echocardiogram done on 09/05 at [facility], but they have some questions on what goes on during this appt and if any dyes will be used. Please call patient back to further discuss.       Action Taken: Other: Cardiology    Travel Screening: Not Applicable    Thank you!  [facility]

## 2023-08-17 NOTE — TELEPHONE ENCOUNTER
[place] [facility] [place] regarding Echo.  Closing encounter as nothing else is needed.

## 2023-09-05 ENCOUNTER — ANCILLARY PROCEDURE (OUTPATIENT)
Dept: CARDIOLOGY | Facility: CLINIC | Age: 68
End: 2023-09-05
Attending: FAMILY MEDICINE
Payer: COMMERCIAL

## 2023-09-05 DIAGNOSIS — R00.2 PALPITATIONS: ICD-10-CM

## 2023-09-05 LAB — LVEF ECHO: NORMAL

## 2023-09-05 PROCEDURE — 93306 TTE W/DOPPLER COMPLETE: CPT | Mod: GC | Performed by: INTERNAL MEDICINE

## 2023-09-12 ENCOUNTER — MYC MEDICAL ADVICE (OUTPATIENT)
Dept: FAMILY MEDICINE | Facility: CLINIC | Age: 68
End: 2023-09-12
Payer: COMMERCIAL

## 2023-09-12 NOTE — RESULT ENCOUNTER NOTE
Juan Gutierres,  Good news - your echo is normal.  As we discussed the echocardiogram assess the anatomical structures of the heart (chambers, valves, etc) and the pumping function of the heart.    Perla Garsia MD

## 2023-09-14 NOTE — TELEPHONE ENCOUNTER
" -- Please advise.     \"Susu has convinced me that CBD might be a good choice for my anxiety. The container says to ask my doctor first. What do you think?\"    Teresa Alonso RN  [facility]    "

## 2023-10-02 ENCOUNTER — NURSE TRIAGE (OUTPATIENT)
Dept: FAMILY MEDICINE | Facility: CLINIC | Age: 68
End: 2023-10-02
Payer: COMMERCIAL

## 2023-10-02 NOTE — TELEPHONE ENCOUNTER
Reason for Call:  Appointment Request    Patient requesting this type of appt: Chronic Diease Management/Medication/Follow-Up    Requested provider: Perla Garsia    Reason patient unable to be scheduled:  Not available soon enough with preferred provider, Shavonne Gant or Faye Lilly.     When does patient want to be seen/preferred time: 3-7 days    Comments: Needs sooner appt than what is open to discuss ongoing asthma symptoms, waking patient up at night.     Could we send this information to you in [facility] or would you prefer to receive a phone call?:   Patient would prefer a phone call   Okay to leave a detailed message?: Yes at Home number on file [phone] (home)    Call taken on 10/2/2023 at 10:30 AM by Kay Carrington

## 2023-10-03 NOTE — TELEPHONE ENCOUNTER
Writer notes patient is scheduled with Dr. Garsia for virtual visit on 10/4/23.    Called patient:  Sporadic and within the past few days  Machinery outside working   W[facility] to [facility] with [facility]-will seek evaluation today if feels symptoms are worsening  Never had a rescue inhaler  25 years since last needing medication to control asthma symptoms  Always had chronic asthma symptoms due to allergies  Some shortness of breath the past few days-feels it is related to the machinery working outside residence and not having a good air filter in place      Patient spoke in complete sentences during phone call and no audible wheezing noted by writer.      RAHAT Cardona, RN-BC  [facility]      Reason for Disposition   MILD difficulty breathing (e.g., minimal/no SOB at rest, SOB with walking, pulse < 100) of new-onset or worse than normal    Additional Information   Negative: SEVERE difficulty breathing (e.g., struggling for each breath, speaks in single words, pulse > 120)   Negative: Breathing stopped and hasn't returned   Negative: Choking on something   Negative: Bluish (or gray) lips or face   Negative: Difficult to awaken or acting confused (e.g., disoriented, slurred speech)   Negative: Passed out (i.e., fainted, collapsed and was not responding)   Negative: Wheezing started suddenly after medicine, an allergic food, or bee sting   Negative: Stridor (harsh sound while breathing in)   Negative: Slow, shallow and weak breathing   Negative: Sounds like a life-threatening emergency to the triager   Negative: Chest pain   Negative: Wheezing (high pitched whistling sound) and previous asthma attacks or use of asthma medicines   Negative: Difficulty breathing and within 14 days of COVID-19 Exposure   Negative: Difficulty breathing and only present when coughing   Negative: Difficulty breathing and only from stuffy nose   Negative: Difficulty breathing and only from stuffy  "nose or runny nose from common cold   Negative: MODERATE difficulty breathing (e.g., speaks in phrases, SOB even at rest, pulse 100-120) of new-onset or worse than normal   Negative: Oxygen level (e.g., pulse oximetry) 90% or lower   Negative: Wheezing can be heard across the room   Negative: Drooling or spitting out saliva (because can't swallow)   Negative: Any history of prior \"blood clot\" in leg or lungs   Negative: Illness requiring prolonged bedrest in past month (e.g., immobilization, long hospital stay)   Negative: Hip or leg fracture (broken bone) in past month (or had cast on leg or ankle in past month)   Negative: Major surgery in the past month   Negative: Long-distance travel in past month (e.g., car, bus, train, plane; with trip lasting 6 or more hours)   Negative: Cancer treatment in past six months (or has cancer now)   Negative: Extra heartbeats, irregular heart beating, or heart is beating very fast (i.e., 'palpitations')   Negative: Fever > 103 F (39.4 C)   Negative: Fever > 101 F (38.3 C) and over 60 years of age   Negative: Fever > 100.0 F (37.8 C) and bedridden (e.g., nursing home patient, stroke, chronic illness, recovering from surgery)   Negative: Fever > 100.0 F (37.8 C) and diabetes mellitus or weak immune system (e.g., HIV positive, cancer chemo, splenectomy, organ transplant, chronic steroids)   Negative: Periods where breathing stops and then resumes normally and bedridden (e.g., nursing home patient, CVA)   Negative: Pregnant or postpartum (from 0 to 6 weeks after delivery)   Negative: Patient sounds very sick or weak to the triager    Protocols used: Breathing Difficulty-A-[place]    "

## 2023-10-04 ENCOUNTER — VIRTUAL VISIT (OUTPATIENT)
Dept: FAMILY MEDICINE | Facility: CLINIC | Age: 68
End: 2023-10-04
Payer: COMMERCIAL

## 2023-10-04 ENCOUNTER — MYC MEDICAL ADVICE (OUTPATIENT)
Dept: FAMILY MEDICINE | Facility: CLINIC | Age: 68
End: 2023-10-04

## 2023-10-04 DIAGNOSIS — R06.2 WHEEZING: ICD-10-CM

## 2023-10-04 DIAGNOSIS — J45.20 MILD INTERMITTENT ASTHMA WITHOUT COMPLICATION: Primary | ICD-10-CM

## 2023-10-04 DIAGNOSIS — R06.02 SHORTNESS OF BREATH: ICD-10-CM

## 2023-10-04 PROCEDURE — 99213 OFFICE O/P EST LOW 20 MIN: CPT | Mod: VID | Performed by: FAMILY MEDICINE

## 2023-10-04 RX ORDER — LEVALBUTEROL TARTRATE 45 UG/1
1-2 AEROSOL, METERED ORAL EVERY 4 HOURS PRN
Qty: 15 G | Refills: 0 | Status: SHIPPED | OUTPATIENT
Start: 2023-10-04
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
## 2023-11-15 ENCOUNTER — NURSE TRIAGE (OUTPATIENT)
Dept: FAMILY MEDICINE | Facility: CLINIC | Age: 68
End: 2023-11-15

## 2023-11-15 ASSESSMENT — ASTHMA QUESTIONNAIRES: ACT_TOTALSCORE: 22

## 2023-11-15 NOTE — TELEPHONE ENCOUNTER
S-(situation): PT asking for an appt with Dr. Garsia, Dr. Marvin, or Faye Lilly NP.    B-(background): PT wanted to have a UTI ruled out.  Has mid back pain. Worse when she walks.   Also, has a URI with sx for weeks. Yellow phlegm.  Also, wants to review migraine issues. Now having auras 1-2 times per week.     A-(assessment): needs a appt to review issues and get a UA.    R-(recommendations): Made an appt for 11/16/23.    DONNA Holden     Reason for Disposition    Patient wants to be seen    Additional Information    Negative: Shock suspected (e.g., cold/pale/clammy skin, too weak to stand, low BP, rapid pulse)    Negative: Sounds like a life-threatening emergency to the triager    Negative: Followed a female genital area injury (e.g., labia, vagina, vulva)    Negative: Followed a male genital area injury (penis, scrotum)    Negative: Vaginal discharge    Negative: Pus (white, yellow) or bloody discharge from end of penis    Negative: Pain or burning with passing urine (urination) and pregnant    Negative: Pain or burning with passing urine (urination) and female    Negative: Pain or burning with passing urine (urination) and male    Negative: Pain or itching in the vulvar area    Negative: Pain in scrotum is main symptom    Negative: Blood in the urine is main symptom    Negative: Symptoms arising from use of a urinary catheter (e.g., Coude, [place])    Negative: Unable to urinate (or only a few drops) > 4 hours and bladder feels very full (e.g., palpable bladder or strong urge to urinate)    Negative: Decreased urination and drinking very little and dehydration suspected (e.g., dark urine, no urine > 12 hours, very dry mouth, very lightheaded)    Negative: Patient sounds very sick or weak to the triager    Negative: Fever > 100.4 F  (38.0 C)    Negative: Side (flank) or lower back pain present    Negative: Bad or foul-smelling urine    Negative: Urinating more frequently than usual (i.e., frequency)    Negative:  "Can't control passage of urine (i.e., urinary incontinence) and new-onset (< 2 weeks) or worsening    Answer Assessment - Initial Assessment Questions  1. SYMPTOM: \"What's the main symptom you're concerned about?\" (e.g., frequency, incontinence)      Low back pain  2. ONSET: \"When did the    start?\"      Wanting a UTI ruled out.  3. PAIN: \"Is there any pain?\" If Yes, ask: \"How bad is it?\" (Scale: 1-10; mild, moderate, severe)      Low back pain.  4. CAUSE: \"What do you think is causing the symptoms?\"      Worried about UTI causing the back pain.  5. OTHER SYMPTOMS: \"Do you have any other symptoms?\" (e.g., blood in urine, fever, flank pain, pain with urination)      Back pain.  6. PREGNANCY: \"Is there any chance you are pregnant?\" \"When was your last menstrual period?\"      no    Protocols used: Urinary Symptoms-A-[place]    "

## 2023-11-16 ENCOUNTER — OFFICE VISIT (OUTPATIENT)
Dept: FAMILY MEDICINE | Facility: CLINIC | Age: 68
End: 2023-11-16
Payer: COMMERCIAL

## 2023-11-16 VITALS
DIASTOLIC BLOOD PRESSURE: 74 MMHG | HEIGHT: 64 IN | WEIGHT: 185 LBS | OXYGEN SATURATION: 98 % | RESPIRATION RATE: 18 BRPM | HEART RATE: 71 BPM | TEMPERATURE: 98 F | BODY MASS INDEX: 31.58 KG/M2 | SYSTOLIC BLOOD PRESSURE: 149 MMHG

## 2023-11-16 DIAGNOSIS — G43.109 OCULAR MIGRAINE: ICD-10-CM

## 2023-11-16 DIAGNOSIS — R53.82 CHRONIC FATIGUE: ICD-10-CM

## 2023-11-16 DIAGNOSIS — M54.50 ACUTE BILATERAL LOW BACK PAIN WITHOUT SCIATICA: Primary | ICD-10-CM

## 2023-11-16 LAB
ALBUMIN UR-MCNC: NEGATIVE MG/DL
APPEARANCE UR: CLEAR
BILIRUB UR QL STRIP: NEGATIVE
COLOR UR AUTO: YELLOW
ERYTHROCYTE [DISTWIDTH] IN BLOOD BY AUTOMATED COUNT: 12 % (ref 10–15)
GLUCOSE UR STRIP-MCNC: NEGATIVE MG/DL
HCT VFR BLD AUTO: 43.5 % (ref 35–53)
HGB BLD-MCNC: 14.6 G/DL (ref 11.7–17.7)
HGB UR QL STRIP: NEGATIVE
KETONES UR STRIP-MCNC: NEGATIVE MG/DL
LEUKOCYTE ESTERASE UR QL STRIP: ABNORMAL
MCH RBC QN AUTO: 31.4 PG (ref 26.5–33)
MCHC RBC AUTO-ENTMCNC: 33.6 G/DL (ref 31.5–36.5)
MCV RBC AUTO: 94 FL (ref 78–100)
NITRATE UR QL: NEGATIVE
PH UR STRIP: 6 [PH] (ref 5–7)
PLATELET # BLD AUTO: 274 10E3/UL (ref 150–450)
RBC # BLD AUTO: 4.65 10E6/UL (ref 3.8–5.9)
RBC #/AREA URNS AUTO: ABNORMAL /HPF
SP GR UR STRIP: <=1.005 (ref 1–1.03)
SQUAMOUS #/AREA URNS AUTO: ABNORMAL /LPF
UROBILINOGEN UR STRIP-ACNC: 0.2 E.U./DL
WBC # BLD AUTO: 6.5 10E3/UL (ref 4–11)
WBC #/AREA URNS AUTO: ABNORMAL /HPF

## 2023-11-16 PROCEDURE — 85027 COMPLETE CBC AUTOMATED: CPT | Performed by: NURSE PRACTITIONER

## 2023-11-16 PROCEDURE — 99213 OFFICE O/P EST LOW 20 MIN: CPT | Performed by: NURSE PRACTITIONER

## 2023-11-16 PROCEDURE — 81001 URINALYSIS AUTO W/SCOPE: CPT | Performed by: NURSE PRACTITIONER

## 2023-11-16 PROCEDURE — 82728 ASSAY OF FERRITIN: CPT | Mod: GZ | Performed by: NURSE PRACTITIONER

## 2023-11-16 PROCEDURE — 82306 VITAMIN D 25 HYDROXY: CPT | Mod: GZ | Performed by: NURSE PRACTITIONER

## 2023-11-16 PROCEDURE — 84443 ASSAY THYROID STIM HORMONE: CPT | Performed by: NURSE PRACTITIONER

## 2023-11-16 PROCEDURE — 36415 COLL VENOUS BLD VENIPUNCTURE: CPT | Performed by: NURSE PRACTITIONER

## 2023-11-16 RX ORDER — RESPIRATORY SYNCYTIAL VIRUS VACCINE 120MCG/0.5
0.5 KIT INTRAMUSCULAR ONCE
Qty: 1 EACH | Refills: 0 | Status: CANCELLED | OUTPATIENT
Start: 2023-11-16 | End: 2023-11-16

## 2023-11-16 ASSESSMENT — ENCOUNTER SYMPTOMS: HEADACHES: 1

## 2023-11-16 ASSESSMENT — PAIN SCALES - GENERAL: PAINLEVEL: NO PAIN (0)

## 2023-11-16 NOTE — PROGRESS NOTES
Assessment & Plan     Acute bilateral low back pain without sciatica  Symptoms sound musculoskeletal, but we will also rule out UTI.  Agreeable to PT referral, declined any medication (muscle relaxants) today.  - Physical Therapy Referral; Future  - UA Macroscopic with reflex to Microscopic and Culture - Clinic Collect    Chronic fatigue  Likely multifactorial, will rule out lab abnormalities and correct if indicated.   - Vitamin D Deficiency; Future  - Ferritin; Future  - TSH with free T4 reflex; Future  - CBC with platelets; Future  - Vitamin D Deficiency  - Ferritin  - TSH with free T4 reflex  - CBC with platelets    Ocular migraine  Declined medication as not having headaches.  Suspect environmental components.  Suggested Flonase if symptoms continue and avoid environmental irritants.          AFBIAN Bueno CNP  [facility]    Gabbie Gutierres is a 68 year old, presenting for the following health issues:  Headache (Back pain/Possible UTI/URI)      11/16/2023    12:40 PM   Additional Questions   Roomed by Sadie LÓPEZ       History of Present Illness       Back Pain:  Nenita Gions presents for follow up of back pain. Patient's back pain is a new problem.    Original cause of back pain: not sure  First noticed back pain: 1-4 weeks ago  Patient feels back pain: comes and goesLocation of back pain:  Right middle of back and left middle of back  Description of back pain: dull ache  Back pain spreads: nowhere    Since patient first noticed back pain, pain is: gradually worsening  Does back pain interfere with Nenita Goins's job:  Not applicable  On a scale of 1-10 (10 being the worst), patient describes pain as:  2  What makes back pain worse: sitting and other   Acupuncture: not tried  Acetaminophen: not tried  Activity or exercise: not helpful  Chiropractor:  Not tried  Cold: not tried  Heat: not tried  Massage: not tried  Muscle relaxants: not tried  NSAIDS: not  "tried  Opioids: not tried  Physical Therapy: not tried  Rest: helpful  Steroid Injection: not tried  Stretching: helpful  Surgery: not tried  TENS unit: not tried  Topical pain relievers: not tried  Other healthcare providers patient is seeing for back pain: None    Reason for visit:  Possible UTI/back pain, upper respiratory problem, frequent migraine auras  Symptom onset:  3-4 weeks ago  Symptoms include:  Mid-back pain, mild burning around bladder, other pain around ribcage, coughing with yellow phlegm, weekly or more migraine aura, mainly without headache  Symptom intensity:  Moderate  Symptom progression:  Staying the same  Had these symptoms before:  Yes  What makes it worse:  Not getting enough rest  What makes it better:  Resting, staying hydrated, fresh air    Nenita Goins eats 4 or more servings of fruits and vegetables daily.Neniat Goins consumes 2 sweetened beverage(s) daily.Nenita Goins exercises with enough effort to increase Nenita Goins's heart rate 9 or less minutes per day.  Nenita Goins exercises with enough effort to increase Nenita Goins's heart rate 3 or less days per week.   Nenita Goins is taking medications regularly.       Back pain is moving around.  Right now has been sleeping on the couch a lot secondary to chronic fatigue.    The coughing up 'gunk\" has gotten better.  Thinks that was secondary to the machinery outside the house for over 2 months and environmental issues.   Headaches- having more aura than previously.  After a while goes away.  Not painful/ no headache involved.      Feels she has residual effects from the COVID vaccine.        Review of Systems   Neurological:  Positive for headaches.      Constitutional, HEENT, cardiovascular, pulmonary, gi and gu systems are negative, except as otherwise noted.      Objective    BP (!) 143/81 (BP Location: Right arm, Patient Position: Sitting, Cuff Size: Adult Regular)   Pulse 71   Temp 98  F (36.7  C) " "(Temporal)   Resp 18   Ht 1.635 m (5' 4.37\")   Wt 83.9 kg (185 lb)   LMP 03/01/2008 (Exact Date)   SpO2 98%   BMI 31.39 kg/m    Body mass index is 31.39 kg/m .  Physical Exam   GENERAL: healthy, alert and no distress  EYES: Eyes grossly normal to inspection, PERRL and conjunctivae and sclerae normal  MS: no gross musculoskeletal defects noted, no edema  NEURO: Normal strength and tone, sensory exam grossly normal, mentation intact, and cranial nerves 2-12 intact                      "

## 2023-11-17 ENCOUNTER — TELEPHONE (OUTPATIENT)
Dept: FAMILY MEDICINE | Facility: CLINIC | Age: 68
End: 2023-11-17
Payer: COMMERCIAL

## 2023-11-17 LAB
FERRITIN SERPL-MCNC: 384 NG/ML (ref 11–409)
TSH SERPL DL<=0.005 MIU/L-ACNC: 1.81 UIU/ML (ref 0.3–4.2)
VIT D+METAB SERPL-MCNC: 20 NG/ML (ref 20–50)

## 2023-11-17 NOTE — TELEPHONE ENCOUNTER
General Call    Contacts         Type Contact Phone/Fax    11/17/2023 08:07 AM CST Phone (Incoming) Nenita Goins (Self) [phone] (H)          Reason for Call:  Exposure to COVID prior to appt yesterday    What are your questions or concerns:      Patient calling clinic to notify provider Faye PERALTA CNP that patient had been exposed to someone with COVID-19 prior to her appointment yesterday. Pt did not find out until after her appt, that the person who came to pt's home to work on their toilet that morning had tested positive for COVID.    Pt states she have NOT tested for COVID yet, and will wait a little while before pt takes tests. If pt test positive, pt will call back to request for treatment.     At this time, pt only wanted to leave this message to the provider as FYI.    Date of last appointment with provider: 11/16/2023    Could we send this information to you in [facility] or would you prefer to receive a phone call?:   Patient would prefer a phone call   Okay to leave a detailed message?: Yes at Home number on file [phone] (home)    Routing message to provider Faye PERALTA CNP.    EDUIN RobbinsN, RN   [facility]

## 2023-11-18 ENCOUNTER — TRANSFERRED RECORDS (OUTPATIENT)
Dept: HEALTH INFORMATION MANAGEMENT | Facility: CLINIC | Age: 68
End: 2023-11-18
Payer: COMMERCIAL

## 2024-01-04 ENCOUNTER — OFFICE VISIT (OUTPATIENT)
Dept: FAMILY MEDICINE | Facility: CLINIC | Age: 69
End: 2024-01-04
Payer: COMMERCIAL

## 2024-01-04 VITALS
WEIGHT: 185 LBS | BODY MASS INDEX: 31.58 KG/M2 | OXYGEN SATURATION: 99 % | HEIGHT: 64 IN | RESPIRATION RATE: 15 BRPM | HEART RATE: 70 BPM | SYSTOLIC BLOOD PRESSURE: 144 MMHG | DIASTOLIC BLOOD PRESSURE: 82 MMHG | TEMPERATURE: 97.8 F

## 2024-01-04 DIAGNOSIS — Z12.83 SKIN EXAM, SCREENING FOR CANCER: ICD-10-CM

## 2024-01-04 DIAGNOSIS — L30.9 DERMATITIS: ICD-10-CM

## 2024-01-04 DIAGNOSIS — B07.0 PLANTAR WART OF RIGHT FOOT: Primary | ICD-10-CM

## 2024-01-04 DIAGNOSIS — G93.32 CFS (CHRONIC FATIGUE SYNDROME): ICD-10-CM

## 2024-01-04 PROCEDURE — 17110 DESTRUCTION B9 LES UP TO 14: CPT | Performed by: FAMILY MEDICINE

## 2024-01-04 PROCEDURE — 99213 OFFICE O/P EST LOW 20 MIN: CPT | Mod: 25 | Performed by: FAMILY MEDICINE

## 2024-01-04 ASSESSMENT — PAIN SCALES - GENERAL: PAINLEVEL: NO PAIN (0)

## 2024-01-04 NOTE — PATIENT INSTRUCTIONS
Wart treatments create local irritation and redness so that the immune system may be activated to clear the virus.  Treatments are destructive and may cause scarring.  Treatment is augmented when home therapy is combined with in-office cryotherapy.  Wait 7 days after in-office liquid nitrogen cryotherapy treatment to start home treatment.  Then soak affected area in warm water for 5-10 minutes.  Gently remove the loose wart tissue using a pumice stone or file.  Cut disc out of Mediplast to the approximate size of the wart an apply over the wart.  Cover with a bandage, duct tape or paper tape.  Discontinue use a few days prior to reassessment/retreatment with cryotherapy.
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
## 2024-01-25 ENCOUNTER — OFFICE VISIT (OUTPATIENT)
Dept: FAMILY MEDICINE | Facility: CLINIC | Age: 69
End: 2024-01-25
Payer: COMMERCIAL

## 2024-01-25 VITALS
RESPIRATION RATE: 15 BRPM | HEART RATE: 71 BPM | SYSTOLIC BLOOD PRESSURE: 138 MMHG | DIASTOLIC BLOOD PRESSURE: 83 MMHG | OXYGEN SATURATION: 97 % | TEMPERATURE: 97.9 F

## 2024-01-25 DIAGNOSIS — B07.0 PLANTAR WART OF RIGHT FOOT: Primary | ICD-10-CM

## 2024-01-25 PROCEDURE — 17110 DESTRUCTION B9 LES UP TO 14: CPT | Performed by: FAMILY MEDICINE

## 2024-01-25 RX ORDER — RESPIRATORY SYNCYTIAL VIRUS VACCINE 120MCG/0.5
0.5 KIT INTRAMUSCULAR ONCE
Qty: 1 EACH | Refills: 0 | Status: CANCELLED | OUTPATIENT
Start: 2024-01-25 | End: 2024-01-25

## 2024-01-25 ASSESSMENT — PAIN SCALES - GENERAL: PAINLEVEL: NO PAIN (0)

## 2024-01-25 NOTE — PATIENT INSTRUCTIONS
Wart treatments create local irritation and redness so that the immune system may be activated to clear the virus.  Treatments are destructive and may cause scarring.  Treatment is augmented when home therapy is combined with in-office cryotherapy.  Wait 7 days after in-office liquid nitrogen cryotherapy treatment to start home treatment.  Then soak affected area in warm water for 5-10 minutes.  Gently remove the loose wart tissue using a pumice stone or file.  Cut disc out of Mediplast to the approximate size of the wart an apply over the wart.  Cover with a bandage, duct tape or paper tape.  Discontinue use a few days prior to reassessment/retreatment with cryotherapy.       To qualify for a [place] parking [place] you do need to have a significant disability such that you cannot walk without a mobility aid or cannot walk more than 200 feet.    Specific legal criteria for [place] parking [place] (must have documentation of meeting at least one of the criteria):  1. Has a cardiac condition to the extent that the applicant's functional limitations are classified in severity as Class III or Class IV according to the standards set by the American Heart Association.  2. Uses portable oxygen  3. Has an arterial oxygen tension (LARON) of less than 60 mm/Hg on room air at rest.  4. Is restricted by a respiratory disease to such an extent that the applicant's forced (respiratory) expiratory volume for one second, when measured by spirometry, is less than one liter.  5. Has lost an arm or leg and does not have or cannot use an artificial limb  6. Due to disability, uses a wheelchair or cannot walk without the aid of:  Another Person; A Walker; A Cane; Crutches; Braces; A Prosthetic Device; or other specified Assistive Device (must specify medical diagnosis causing disability).  7. Has a disability that would be aggravated by walking 200 feet under normal environmental conditions to an extent that would be  life-threatening (must specify medical diagnosis causing disability).   8. Due to disability cannot walk 200 feet without stopping to rest (must specify medical diagnosis causing disability).   9. Cannot walk without a significant risk of falling (must specify medical diagnosis causing disability).

## 2024-01-25 NOTE — PROGRESS NOTES
Assessment & Plan     Plantar wart of right foot  We scheduled her to return in 3 weeks to reassess if she needs another LN treatment.    - DESTRUCT BENIGN LESION, UP TO 14      Patient Instructions   Wart treatments create local irritation and redness so that the immune system may be activated to clear the virus.  Treatments are destructive and may cause scarring.  Treatment is augmented when home therapy is combined with in-office cryotherapy.  Wait 7 days after in-office liquid nitrogen cryotherapy treatment to start home treatment.  Then soak affected area in warm water for 5-10 minutes.  Gently remove the loose wart tissue using a pumice stone or file.  Cut disc out of Mediplast to the approximate size of the wart an apply over the wart.  Cover with a bandage, duct tape or paper tape.  Discontinue use a few days prior to reassessment/retreatment with cryotherapy.       To [place] you do need to have a significant disability such that you cannot walk without a mobility aid or cannot walk more than 200 feet.    Specific [place] criteria for [place] (must have documentation of meeting at least one of the criteria):  1. Has a cardiac condition to the extent that the applicant's functional limitations are classified in severity as Class III or Class IV according to the standards set by the American Heart Association.  2. Uses portable oxygen  3. Has an arterial oxygen tension (LARON) of less than 60 mm/Hg on room air at rest.  4. Is restricted by a respiratory disease to such an extent that the applicant's forced (respiratory) expiratory volume for one second, when measured by spirometry, is less than one liter.  5. Has lost an arm or leg and does not have or cannot use an artificial limb  6. Due to disability, uses a wheelchair or cannot walk without the aid of:  Another Person; A Walker; A Cane; Crutches; Braces; A Prosthetic Device; or other specified  Assistive Device (must specify medical diagnosis causing disability).  7. Has a disability that would be aggravated by walking 200 feet under normal environmental conditions to an extent that would be life-threatening (must specify medical diagnosis causing disability).   8. Due to disability cannot walk 200 feet without stopping to rest (must specify medical diagnosis causing disability).   9. Cannot walk without a significant risk of falling (must specify medical diagnosis causing disability).     Gabbie Gutierres is a 68 year old, presenting for the following health issues:  Wart        1/25/2024     4:25 PM   Additional Questions   Roomed by Sadie PEACOCK     History of Present Illness       Reason for visit:  Plantar wart treatment    Nenita Goins eats 4 or more servings of fruits and vegetables daily.Nenita Goins consumes 2 sweetened beverage(s) daily.Nenita Goins exercises with enough effort to increase Nenita Goins's heart rate 9 or less minutes per day.  Nenita Goins exercises with enough effort to increase Nenita Goins's heart rate 3 or less days per week.   Nenita Goins is taking medications regularly.     I treated wart with LN 3 weeks ago and she reports no blistering.  She has continued to soak and pare off the overlying callous.      She is very discouraged about her CFS.  She had 2 good days but today is very run down.  She had to drive her partner for her partner's colonoscopy and struggled to walk from the parking lot to the colonoscopy suite.  She is wondering about disability parking permit.        Objective    /83 (BP Location: Right arm, Patient Position: Sitting, Cuff Size: Adult Regular)   Pulse 71   Temp 97.9  F (36.6  C) (Temporal)   Resp 15   LMP 03/01/2008 (Exact Date)   SpO2 97%   There is no height or weight on file to calculate BMI.  Physical Exam   GEN:  no apparent distress  FOOT: callous on right foot roughly overlying 5th metatarsal  head.    PROCEDURE:  The callous was pared using a #10 blade revealing a very small wart which was treated with liquid nitrogen x 2 with intermediate thaw.  Patient tolerated the procedure well.         Signed Electronically by: Perla Garsia MD

## 2024-02-22 ENCOUNTER — OFFICE VISIT (OUTPATIENT)
Dept: FAMILY MEDICINE | Facility: CLINIC | Age: 69
End: 2024-02-22
Payer: COMMERCIAL

## 2024-02-22 VITALS
TEMPERATURE: 97.2 F | RESPIRATION RATE: 16 BRPM | SYSTOLIC BLOOD PRESSURE: 142 MMHG | DIASTOLIC BLOOD PRESSURE: 86 MMHG | HEART RATE: 70 BPM | OXYGEN SATURATION: 100 %

## 2024-02-22 DIAGNOSIS — B07.0 PLANTAR WARTS: Primary | ICD-10-CM

## 2024-02-22 DIAGNOSIS — R25.3 FASCICULATIONS: ICD-10-CM

## 2024-02-22 DIAGNOSIS — R87.610 ASCUS OF CERVIX WITH NEGATIVE HIGH RISK HPV: ICD-10-CM

## 2024-02-22 DIAGNOSIS — Z12.4 CERVICAL CANCER SCREENING: ICD-10-CM

## 2024-02-22 PROCEDURE — 87624 HPV HI-RISK TYP POOLED RSLT: CPT | Performed by: FAMILY MEDICINE

## 2024-02-22 PROCEDURE — 88175 CYTOPATH C/V AUTO FLUID REDO: CPT | Performed by: FAMILY MEDICINE

## 2024-02-22 PROCEDURE — 17110 DESTRUCTION B9 LES UP TO 14: CPT | Performed by: FAMILY MEDICINE

## 2024-02-22 RX ORDER — RESPIRATORY SYNCYTIAL VIRUS VACCINE 120MCG/0.5
0.5 KIT INTRAMUSCULAR ONCE
Qty: 1 EACH | Refills: 0 | Status: CANCELLED | OUTPATIENT
Start: 2024-02-22 | End: 2024-02-22

## 2024-02-22 NOTE — PROGRESS NOTES
Assessment & Plan     Plantar warts  3rd treatment of right foot wart which is now markedly smaller.  1st treatment of left foot warts.  I'll see her back in 3 weeks.  - DESTRUCT BENIGN LESION, UP TO 14    ASCUS of cervix with negative high risk HPV  Cervical cancer screening  If she needs further paps we'll consider pretreatment with vaginal estrogen.  - Pap diagnostic with HPV    Fasciculations  By patient report.  I recommended she monitor and notify me of persisting symptoms.           Gabbie Gutierres is a 68 year old, presenting for the following health issues:  Wart and Gyn Exam      2/22/2024     3:51 PM   Additional Questions   Roomed by DONNA HATFIELD     History of Present Illness       Reason for visit:  Wart treatment  pap smear  followup on various tests    Nenita Goins eats 4 or more servings of fruits and vegetables daily.Nenita Goins consumes 2 sweetened beverage(s) daily.Nenita Goins exercises with enough effort to increase Nenita Goins's heart rate 9 or less minutes per day.  Nenita Goins exercises with enough effort to increase Nenita Goins's heart rate 3 or less days per week.   Nenita Goins is taking medications regularly.     Feels the wart on the right foot is much improved but has noticed two or three  new warts on the left foot.        Had 2 episodes of fasciculations of lip and one episode of fasciculations of left cheek.  Didn't feel like a cramp or twitch.          Objective    BP (!) 142/86 (BP Location: Right arm, Patient Position: Sitting, Cuff Size: Adult Regular)   Pulse 70   Temp 97.2  F (36.2  C) (Temporal)   Resp 16   LMP 03/01/2008 (Exact Date)   SpO2 100%   There is no height or weight on file to calculate BMI.  Physical Exam   GEN:  no apparent distress  :  vulva, vagina, and cervix are normal in appearance with atrophic changes and pap smear was obtained with significant discomfort to the patient.   SKIN: callosities removed revealing warts overlying  right 5th metatarsal head (very tiny) and left 2nd metatarsal head and left inner heel (very tiny).    PROCEDURE:  The associated calluses of the warts were pared using a #15 blade before treating with liquid nitrogen x 2 with intermediate thaw.  Patient tolerated the procedure well.         Signed Electronically by: Perla Garsia MD

## 2024-02-28 LAB
BKR LAB AP GYN ADEQUACY: NORMAL
BKR LAB AP GYN INTERPRETATION: NORMAL
BKR LAB AP HPV REFLEX: NORMAL
BKR LAB AP PREVIOUS ABNL DX: NORMAL
BKR LAB AP PREVIOUS ABNORMAL: NORMAL
PATH REPORT.COMMENTS IMP SPEC: NORMAL
PATH REPORT.COMMENTS IMP SPEC: NORMAL
PATH REPORT.RELEVANT HX SPEC: NORMAL

## 2024-02-29 LAB
HUMAN PAPILLOMA VIRUS 16 DNA: NEGATIVE
HUMAN PAPILLOMA VIRUS 18 DNA: NEGATIVE
HUMAN PAPILLOMA VIRUS FINAL DIAGNOSIS: NORMAL
HUMAN PAPILLOMA VIRUS OTHER HR: NEGATIVE

## 2024-03-05 ENCOUNTER — PATIENT OUTREACH (OUTPATIENT)
Dept: FAMILY MEDICINE | Facility: CLINIC | Age: 69
End: 2024-03-05
Payer: COMMERCIAL

## 2024-03-05 NOTE — TELEPHONE ENCOUNTER
2/22/24 NIL pap, Neg HPV. Plan 1 yr co-test, may treat with vaginal estrogen before the pap if decided to complete  it.

## 2024-03-14 ENCOUNTER — OFFICE VISIT (OUTPATIENT)
Dept: FAMILY MEDICINE | Facility: CLINIC | Age: 69
End: 2024-03-14
Payer: COMMERCIAL

## 2024-03-14 VITALS
DIASTOLIC BLOOD PRESSURE: 78 MMHG | BODY MASS INDEX: 32.3 KG/M2 | SYSTOLIC BLOOD PRESSURE: 110 MMHG | HEIGHT: 64 IN | WEIGHT: 189.2 LBS | RESPIRATION RATE: 18 BRPM | TEMPERATURE: 96.8 F | OXYGEN SATURATION: 98 % | HEART RATE: 78 BPM

## 2024-03-14 DIAGNOSIS — B07.0 PLANTAR WARTS: Primary | ICD-10-CM

## 2024-03-14 DIAGNOSIS — R87.610 ASCUS OF CERVIX WITH NEGATIVE HIGH RISK HPV: ICD-10-CM

## 2024-03-14 DIAGNOSIS — Z78.0 ASYMPTOMATIC POSTMENOPAUSAL STATUS: ICD-10-CM

## 2024-03-14 PROCEDURE — 17110 DESTRUCTION B9 LES UP TO 14: CPT | Performed by: FAMILY MEDICINE

## 2024-03-14 PROCEDURE — 99213 OFFICE O/P EST LOW 20 MIN: CPT | Mod: 25 | Performed by: FAMILY MEDICINE

## 2024-03-14 RX ORDER — RESPIRATORY SYNCYTIAL VIRUS VACCINE 120MCG/0.5
0.5 KIT INTRAMUSCULAR ONCE
Qty: 1 EACH | Refills: 0 | Status: CANCELLED | OUTPATIENT
Start: 2024-03-14 | End: 2024-03-14

## 2024-03-14 NOTE — PROGRESS NOTES
Assessment & Plan     Plantar warts  The expected blistering, reddening, or scabbing reaction was explained. Return in 3-4 weeks if lesions fail to fully resolve - follow-up appt scheduled.   - DESTRUCT BENIGN LESION, UP TO 14    ASCUS of cervix with negative high risk HPV  ASCUS with negative HPV on pap 2021.  Pap result last month was NIL with negative HPV.  Discussed that current guidelines for cervical cancer screening recommend one more (normal) pap before discontinuing cervical cancer screening for life.  However, the speculum exam last month was very painful for her and with her chemical sensitivities she is very hesitant to pretreat with vaginal estrogen.  She has been in a same-sex relationship for years and is low risk for cervical cancer and she prefers to discontinue cervical cancer screening at this point.     Asymptomatic postmenopausal status  - DX Bone Density      Gabbie Gutierres is a 68 year old, presenting for the following health issues:  Follow Up (warts)        3/14/2024     3:41 PM   Additional Questions   Roomed by ita   Accompanied by self     History of Present Illness       Reason for visit:  Plantar warts, facial twitches    Nenita Goins eats 4 or more servings of fruits and vegetables daily.Nenita Goins consumes 2 sweetened beverage(s) daily.Nenita Goins exercises with enough effort to increase Nenita Goins's heart rate 9 or less minutes per day.  Nenita Goins exercises with enough effort to increase Nenita Goins's heart rate 3 or less days per week.   Nenita Goins is taking medications regularly.     Since our last visit she's had only one, slight episode of the fasciculations of left lower face.  This was much milder than the 3 prior episodes.  These have always occurred only when she's waking up.    Left heel wart blistered and calloused.        Objective    /78 (BP Location: Right arm, Patient Position: Sitting, Cuff Size: Adult Regular)   Pulse 78    "Temp 96.8  F (36  C) (Temporal)   Resp 18   Ht 1.63 m (5' 4.17\")   Wt 85.8 kg (189 lb 3.2 oz)   LMP 03/01/2008 (Exact Date)   SpO2 98%   BMI 32.30 kg/m    Body mass index is 32.3 kg/m .  Physical Exam   SKIN: callosities removed over the previously treated warts, revealing warts overlying right 5th metatarsal head (very tiny) and left 2nd metatarsal head and left inner heel (very tiny).  There is also a very small wart overlying the right 2nd metatarsal head.    PROCEDURE:  The associated calluses of the warts were pared using a #15 blade before treating with liquid nitrogen.  The warts on the right foot were treated twice with intermediate thaw while the warts on the left foot were treated once without re-treatment due to patient discomfort/request.      Pap:  Office Visit on 02/22/2024   Component Date Value Ref Range Status    Interpretation 02/22/2024 Negative for Intraepithelial Lesion or Malignancy (NILM)    Final    Comment 02/22/2024    Final                    Value:This result contains rich text formatting which cannot be displayed here.    Specimen Adequacy 02/22/2024 Satisfactory for evaluation, endocerv/transformation zone component absent, atrophy   Final    Clinical Information 02/22/2024    Final                    Value:This result contains rich text formatting which cannot be displayed here.    Reflex Testing 02/22/2024 Yes regardless of result   Final    Previous Abnormal? 02/22/2024    Final                    Value:This result contains rich text formatting which cannot be displayed here.    Previous Abnormal Diagnosis 02/22/2024    Final                    Value:This result contains rich text formatting which cannot be displayed here.    Performing Labs 02/22/2024    Final                    Value:This result contains rich text formatting which cannot be displayed here.    Other HR HPV 02/22/2024 Negative  Negative Final    HPV16 DNA 02/22/2024 Negative  Negative Final    HPV18 DNA " 02/22/2024 Negative  Negative Final    FINAL DIAGNOSIS 02/22/2024    Final                    Value:This result contains rich text formatting which cannot be displayed here.      Lab Results   Component Value Date    PAP ASC-US 02/22/2021              Signed Electronically by: Perla Garsia MD

## 2024-04-05 ENCOUNTER — TELEPHONE (OUTPATIENT)
Dept: FAMILY MEDICINE | Facility: CLINIC | Age: 69
End: 2024-04-05
Payer: COMMERCIAL

## 2024-04-05 NOTE — TELEPHONE ENCOUNTER
Patient would like to reschedule appointment of 11 Apr 2024 [for wart removal]. No appointments available and requests to be squeezed in if able. Please advise. Thanks.    Rosamaria Newton RN, BSN, PHN  [facility]

## 2024-04-08 NOTE — TELEPHONE ENCOUNTER
"OK to reschedule using an \"APPROVAL REQ\" slot.  However, since I've been out of the office there are probably very few of those left.  "

## 2024-05-15 ENCOUNTER — VIRTUAL VISIT (OUTPATIENT)
Dept: FAMILY MEDICINE | Facility: CLINIC | Age: 69
End: 2024-05-15
Payer: COMMERCIAL

## 2024-05-15 DIAGNOSIS — G93.32 CFS (CHRONIC FATIGUE SYNDROME): ICD-10-CM

## 2024-05-15 DIAGNOSIS — M79.7 FIBROMYALGIA: ICD-10-CM

## 2024-05-15 DIAGNOSIS — B07.0 PLANTAR WARTS: ICD-10-CM

## 2024-05-15 DIAGNOSIS — R25.3 FASCICULATIONS: Primary | ICD-10-CM

## 2024-05-15 PROCEDURE — 99214 OFFICE O/P EST MOD 30 MIN: CPT | Mod: 95 | Performed by: FAMILY MEDICINE

## 2024-05-15 PROCEDURE — G2211 COMPLEX E/M VISIT ADD ON: HCPCS | Mod: 95 | Performed by: FAMILY MEDICINE

## 2024-05-15 NOTE — PROGRESS NOTES
Nenita is a 68 year old who is being evaluated via a billable video visit.          Assessment & Plan     Fasciculations  She has expressed concern about this for a few months.  I recommend she see neuro for further eval  - Adult Neurology  Referral    Fibromyalgia  CFS (chronic fatigue syndrome)  Recent flare of joint pain and fatigue.  She will rest and monitor her symptoms.  Discussed that if joint pains continue to be worse than her typical fibro pain (especially if she has redness and swelling of the joints) to let me know and we'll consider a rheum lab work-up (sed rate, CRP, RF, CCP, etc) or rheum referral     Plantar warts  She has had pain at the sites of LN treatment so has put off re-treatment.  She is seeing derm next month for full body skin exam and will have them take a look at her plantar warts.    The longitudinal plan of care for the diagnosis(es)/condition(s) as documented were addressed during this visit. Due to the added complexity in care, I will continue to support Nenita in the subsequent management and with ongoing continuity of care.    I spent a total of 32 minutes on the day of the visit.  24 minutes spent face-to-face.   Time spent by me doing chart review, history and exam, documentation and further activities per the note      Subjective   Nenita is a 68 year old, presenting for the following health issues:  No chief complaint on file.      Video Start Time: 11:03 AM    History of Present Illness       Reason for visit:  Follow-up on post herpetic neuralgia and possibly related facial tremors    Nenita eats 4 or more servings of fruits and vegetables daily.Nenita consumes 2 sweetened beverage(s) daily.Nenita exercises with enough effort to increase Nenita's heart rate 9 or less minutes per day.  Nenita exercises with enough effort to increase Nenita's heart rate 3 or less days per week.   Nenita is taking medications regularly.       Follow-Up Lip Tremor  Front left lower face/lip  tremors persist  Had left V1 dermatome shingles a year ago (March 2023)  Brief episodes - lasts just a second  Feels like she briefly loses her coordination and has bitten the inside of her lip and sometimes has trouble articulating words.  Sometimes feels her hand coordination is sometimes affected.  Sometimes feels she misreads written words when she's tired.    Follow-Up Fibromyalgia/CFS  More joint pain  Was out during the nice weather this weekend and probably overdid it.  Also finds her chronic chemical sensitivity has been bothered by [place] wildfire smoke/air quality issues and mold in windows    Psychosocial stressors - Lots of deaths/losses of friends and family    ROS  Neuro as above         Objective           Vitals:  No vitals were obtained today due to virtual visit.    Physical Exam   GENERAL: alert and no distress  EYES: Eyes grossly normal to inspection.  No discharge or erythema, or obvious scleral/conjunctival abnormalities.  RESP: No audible wheeze, cough, or visible cyanosis.    SKIN: Visible skin clear. No significant rash, abnormal pigmentation or lesions.  NEURO: Cranial nerves grossly intact.  Mentation and speech appropriate for age.  No visible facial tics, tremors or fasciculations  PSYCH: Appears anxious with appropriate affect, tone, and pace of words        Video-Visit Details    Type of service:  Video Visit   Video End Time:11:27 AM  Originating Location (pt. Location): Home  Distant Location (provider location):  Off-site  Platform used for Video Visit: Edita  Signed Electronically by: Perla Garsia MD

## 2024-05-15 NOTE — PATIENT INSTRUCTIONS
If your joint pains persist, not improved with rest, and you feel the joints are swollen or red, let me know and we can do some additional labs and/or consider a rheumatology referral.

## 2024-05-17 DIAGNOSIS — B02.29 POSTHERPETIC NEURALGIA: ICD-10-CM

## 2024-05-17 DIAGNOSIS — G44.89 OTHER HEADACHE SYNDROME: ICD-10-CM

## 2024-06-01 ENCOUNTER — TELEPHONE (OUTPATIENT)
Dept: FAMILY MEDICINE | Facility: CLINIC | Age: 69
End: 2024-06-01
Payer: COMMERCIAL

## 2024-06-01 NOTE — TELEPHONE ENCOUNTER
COVID Positive/Requesting COVID treatment    Patient is positive for COVID and requesting treatment options.    Date of positive COVID test (PCR or at home)? 6/1/24  Current COVID symptoms: fever or chills, fatigue, muscle or body aches, headache, sore throat, and congestion or runny nose  Date COVID symptoms began: 5/31/24    Message should be routed to clinic RN pool. Best phone number to use for call back: [phone]

## 2024-06-03 NOTE — TELEPHONE ENCOUNTER
Patient states no sx changes but is no longer interested in tx with paxlovid. Reviewed home care measures and timeline for paxlovid tx if patient changes her mind. The patient indicates understanding of these issues and agrees with the plan.    EDUIN LintonN, RN (she/her)  [facility] RN

## 2024-06-17 ENCOUNTER — MYC MEDICAL ADVICE (OUTPATIENT)
Dept: FAMILY MEDICINE | Facility: CLINIC | Age: 69
End: 2024-06-17
Payer: COMMERCIAL

## 2024-06-27 ENCOUNTER — TRANSFERRED RECORDS (OUTPATIENT)
Dept: HEALTH INFORMATION MANAGEMENT | Facility: CLINIC | Age: 69
End: 2024-06-27
Payer: COMMERCIAL

## 2024-07-10 ENCOUNTER — MYC MEDICAL ADVICE (OUTPATIENT)
Dept: FAMILY MEDICINE | Facility: CLINIC | Age: 69
End: 2024-07-10
Payer: COMMERCIAL

## 2024-07-10 DIAGNOSIS — M79.673 PAIN OF FOOT, UNSPECIFIED LATERALITY: Primary | ICD-10-CM

## 2024-07-12 NOTE — TELEPHONE ENCOUNTER
Pended podiatry referral for both concerns.    I seem to have a sliver of glass stuck in my foot, which neither I nor Susu can see properly. It's only sporadically painful but I'd like to get it out.     Also, my mild hammer toe which has never ever bothered me is now causing pain.     Should I try the podiatrist or should I try to get an appointment with you.    EDUIN eJsusN, PHN, RN  [facility]  [phone]

## 2024-07-15 ENCOUNTER — TELEPHONE (OUTPATIENT)
Dept: FAMILY MEDICINE | Facility: CLINIC | Age: 69
End: 2024-07-15
Payer: COMMERCIAL

## 2024-07-15 NOTE — TELEPHONE ENCOUNTER
"  General Call    Contacts       Contact Date/Time Type Contact Phone/Fax    07/15/2024 02:43 PM CDT Phone (Incoming) Nenita Goins (Self) [phone] (M)          Reason for Call:  Medication request/ In-Person appointment request    What are your questions or concerns:      Patient calling clinic, as she believe she is having another flare-up of \"Shingles.\" The last time patient had Shingles was March of last year. Patient already schedule a video visit with Dr. Iyer for this coming Wendesday as highlighted below:     Jul 17, 2024 9:30 AM  (Arrive by 9:25 AM)  Provider Visit with Perla Garsia MD  [facility] ([facility] ) [phone]     Patient would prefer to be seen in-person if possible, and patient just really wants to get started on medication ASAP. Patient is wondering if she could be seen In-Person for this visit, or sooner?     Patient would like to get started on medication since the location of shingle is half an inch close to the left corner of the eye. Patient is just concern since it is so close to the eye.      Preferred Patient Pharmacy:  [facility] [place]    Date of last appointment with provider: 5/15/2024    Could we send this information to you in [facility] or would you prefer to receive a phone call?:   Patient would prefer a phone call   Okay to leave a detailed message?: Yes at Cell number on file:    Telephone Information:   Mobile [phone]     Routing message to Dr. Garsia to review and advise.    EDUIN RobbinsN, RN   [facility]    "

## 2024-07-16 ENCOUNTER — OFFICE VISIT (OUTPATIENT)
Dept: FAMILY MEDICINE | Facility: CLINIC | Age: 69
End: 2024-07-16
Payer: COMMERCIAL

## 2024-07-16 VITALS
BODY MASS INDEX: 31.24 KG/M2 | HEIGHT: 64 IN | HEART RATE: 66 BPM | SYSTOLIC BLOOD PRESSURE: 153 MMHG | RESPIRATION RATE: 15 BRPM | OXYGEN SATURATION: 99 % | WEIGHT: 183 LBS | TEMPERATURE: 98.2 F | DIASTOLIC BLOOD PRESSURE: 82 MMHG

## 2024-07-16 DIAGNOSIS — R23.8 PAPULES: Primary | ICD-10-CM

## 2024-07-16 PROCEDURE — 99213 OFFICE O/P EST LOW 20 MIN: CPT | Performed by: FAMILY MEDICINE

## 2024-07-16 ASSESSMENT — PAIN SCALES - GENERAL: PAINLEVEL: NO PAIN (0)

## 2024-07-16 ASSESSMENT — ASTHMA QUESTIONNAIRES
ACT_TOTALSCORE: 22
QUESTION_3 LAST FOUR WEEKS HOW OFTEN DID YOUR ASTHMA SYMPTOMS (WHEEZING, COUGHING, SHORTNESS OF BREATH, CHEST TIGHTNESS OR PAIN) WAKE YOU UP AT NIGHT OR EARLIER THAN USUAL IN THE MORNING: ONCE OR TWICE
ACT_TOTALSCORE: 22
QUESTION_5 LAST FOUR WEEKS HOW WOULD YOU RATE YOUR ASTHMA CONTROL: WELL CONTROLLED
QUESTION_1 LAST FOUR WEEKS HOW MUCH OF THE TIME DID YOUR ASTHMA KEEP YOU FROM GETTING AS MUCH DONE AT WORK, SCHOOL OR AT HOME: NONE OF THE TIME
QUESTION_2 LAST FOUR WEEKS HOW OFTEN HAVE YOU HAD SHORTNESS OF BREATH: ONCE OR TWICE A WEEK
QUESTION_4 LAST FOUR WEEKS HOW OFTEN HAVE YOU USED YOUR RESCUE INHALER OR NEBULIZER MEDICATION (SUCH AS ALBUTEROL): NOT AT ALL

## 2024-07-16 NOTE — PROGRESS NOTES
"  Assessment & Plan     Papules  The faint papules do not appear to be vesicles.  I recommended she monitor for any evolving symptoms or additional lesions.  Discussed typical appearance of shingles vesicles.  Notify me of any change.      Gabbie Gutierres is a 69 year old, presenting for the following health issues:  Shingles (Shingles )        7/16/2024    11:24 AM   Additional Questions   Roomed by Sima Martin     HPI     Concerned about possible recurrent shingles of left V1 dermatome.  Two days ago she noticed increased sensitivity just above nasal bridge - then developed a pimple there  Then some small bumps on nasal side of left eye  This morning the area had some crusting over the skin (but not crusting in eye).  No eye symptoms.    Yesterday and today she had a generalized headache  Similar distribution as her zoster from March 2023 (V1) from which she had post-herpetic neuralgia  Has been overexerting        Objective    BP (!) 153/82 (BP Location: Right arm, Patient Position: Sitting, Cuff Size: Adult Regular)   Pulse 66   Temp 98.2  F (36.8  C) (Temporal)   Resp 15   Ht 1.63 m (5' 4.17\")   Wt 83 kg (183 lb)   LMP 03/01/2008 (Exact Date)   SpO2 99%   BMI 31.24 kg/m    Body mass index is 31.24 kg/m .  Physical Exam   GEN:  no apparent distress  EYES: PERRL, EOMI, sclerae and conjunctivae clear with no discharge  FACE: atraumatic, no facial asymmetry, there are a few, tiny, flesh-colored skin papules between bridge of nose and left eye.  No vesicles or erythema.          Signed Electronically by: Perla Garsia MD    "

## 2024-07-16 NOTE — PATIENT INSTRUCTIONS
Let me know if you develop more typical skin spots of shingles - tiny fluid-filled blisters on a red base.

## 2024-07-28 SDOH — HEALTH STABILITY: PHYSICAL HEALTH: ON AVERAGE, HOW MANY MINUTES DO YOU ENGAGE IN EXERCISE AT THIS LEVEL?: 0 MIN

## 2024-07-28 SDOH — HEALTH STABILITY: PHYSICAL HEALTH: ON AVERAGE, HOW MANY DAYS PER WEEK DO YOU ENGAGE IN MODERATE TO STRENUOUS EXERCISE (LIKE A BRISK WALK)?: 0 DAYS

## 2024-07-28 ASSESSMENT — ASTHMA QUESTIONNAIRES
QUESTION_1 LAST FOUR WEEKS HOW MUCH OF THE TIME DID YOUR ASTHMA KEEP YOU FROM GETTING AS MUCH DONE AT WORK, SCHOOL OR AT HOME: NONE OF THE TIME
QUESTION_3 LAST FOUR WEEKS HOW OFTEN DID YOUR ASTHMA SYMPTOMS (WHEEZING, COUGHING, SHORTNESS OF BREATH, CHEST TIGHTNESS OR PAIN) WAKE YOU UP AT NIGHT OR EARLIER THAN USUAL IN THE MORNING: ONCE OR TWICE
QUESTION_4 LAST FOUR WEEKS HOW OFTEN HAVE YOU USED YOUR RESCUE INHALER OR NEBULIZER MEDICATION (SUCH AS ALBUTEROL): NOT AT ALL
ACT_TOTALSCORE: 22
QUESTION_2 LAST FOUR WEEKS HOW OFTEN HAVE YOU HAD SHORTNESS OF BREATH: ONCE OR TWICE A WEEK
QUESTION_5 LAST FOUR WEEKS HOW WOULD YOU RATE YOUR ASTHMA CONTROL: WELL CONTROLLED
ACT_TOTALSCORE: 22

## 2024-07-28 ASSESSMENT — SOCIAL DETERMINANTS OF HEALTH (SDOH): HOW OFTEN DO YOU GET TOGETHER WITH FRIENDS OR RELATIVES?: MORE THAN THREE TIMES A WEEK

## 2024-07-29 ENCOUNTER — OFFICE VISIT (OUTPATIENT)
Dept: FAMILY MEDICINE | Facility: CLINIC | Age: 69
End: 2024-07-29
Payer: COMMERCIAL

## 2024-07-29 VITALS
SYSTOLIC BLOOD PRESSURE: 126 MMHG | WEIGHT: 182 LBS | RESPIRATION RATE: 15 BRPM | OXYGEN SATURATION: 97 % | TEMPERATURE: 97.4 F | HEIGHT: 64 IN | BODY MASS INDEX: 31.07 KG/M2 | DIASTOLIC BLOOD PRESSURE: 77 MMHG | HEART RATE: 65 BPM

## 2024-07-29 DIAGNOSIS — Z12.31 ENCOUNTER FOR SCREENING MAMMOGRAM FOR BREAST CANCER: ICD-10-CM

## 2024-07-29 DIAGNOSIS — Z13.220 LIPID SCREENING: ICD-10-CM

## 2024-07-29 DIAGNOSIS — Z00.00 ENCOUNTER FOR MEDICARE ANNUAL WELLNESS EXAM: Primary | ICD-10-CM

## 2024-07-29 DIAGNOSIS — Z13.1 SCREENING FOR DIABETES MELLITUS: ICD-10-CM

## 2024-07-29 PROCEDURE — G0439 PPPS, SUBSEQ VISIT: HCPCS | Performed by: FAMILY MEDICINE

## 2024-07-29 ASSESSMENT — PAIN SCALES - GENERAL: PAINLEVEL: NO PAIN (0)

## 2024-07-29 NOTE — PATIENT INSTRUCTIONS
Schedule a fasting lab-only appointment. Fast for 10 hours prior to labs: nothing to eat or drink except plain water and your pills for ten hours prior to appointment.  In addition, avoid fatty foods and alcohol for 24 hours prior to appointment.     Patient Education   Preventive Care Advice   This is general advice given by our system to help you stay healthy. However, your care team may have specific advice just for you. Please talk to your care team about your preventive care needs.  Nutrition  Eat 5 or more servings of fruits and vegetables each day.  Try wheat bread, brown rice and whole grain pasta (instead of white bread, rice, and pasta).  Get enough calcium and vitamin D. Check the label on foods and aim for 100% of the RDA (recommended daily allowance).  Lifestyle  Exercise at least 150 minutes each week  (30 minutes a day, 5 days a week).  Do muscle strengthening activities 2 days a week. These help control your weight and prevent disease.  No smoking.  Wear sunscreen to prevent skin cancer.  Have a dental exam and cleaning every 6 months.  Yearly exams  See your health care team every year to talk about:  Any changes in your health.  Any medicines your care team has prescribed.  Preventive care, family planning, and ways to prevent chronic diseases.  Shots (vaccines)   HPV shots (up to age 26), if you've never had them before.  Hepatitis B shots (up to age 59), if you've never had them before.  COVID-19 shot: Get this shot when it's due.  Flu shot: Get a flu shot every year.  Tetanus shot: Get a tetanus shot every 10 years.  Pneumococcal, hepatitis A, and RSV shots: Ask your care team if you need these based on your risk.  Shingles shot (for age 50 and up)  General health tests  Diabetes screening:  Starting at age 35, Get screened for diabetes at least every 3 years.  If you are younger than age 35, ask your care team if you should be screened for diabetes.  Cholesterol test: At age 39, start having a  cholesterol test every 5 years, or more often if advised.  Bone density scan (DEXA): At age 50, ask your care team if you should have this scan for osteoporosis (brittle bones).  Hepatitis C: Get tested at least once in your life.  STIs (sexually transmitted infections)  Before age 24: Ask your care team if you should be screened for STIs.  After age 24: Get screened for STIs if you're at risk. You are at risk for STIs (including HIV) if:  You are sexually active with more than one person.  You don't use condoms every time.  You or a partner was diagnosed with a sexually transmitted infection.  If you are at risk for HIV, ask about PrEP medicine to prevent HIV.  Get tested for HIV at least once in your life, whether you are at risk for HIV or not.  Cancer screening tests  Cervical cancer screening: If you have a cervix, begin getting regular cervical cancer screening tests starting at age 21.  Breast cancer scan (mammogram): If you've ever had breasts, begin having regular mammograms starting at age 40. This is a scan to check for breast cancer.  Colon cancer screening: It is important to start screening for colon cancer at age 45.  Have a colonoscopy test every 10 years (or more often if you're at risk) Or, ask your provider about stool tests like a FIT test every year or Cologuard test every 3 years.  To learn more about your testing options, visit:   .  For help making a decision, visit:   https://bit.ly/cp81529.  Prostate cancer screening test: If you have a prostate, ask your care team if a prostate cancer screening test (PSA) at age 55 is right for you.  Lung cancer screening: If you are a current or former smoker ages 50 to 80, ask your care team if ongoing lung cancer screenings are right for you.  For informational purposes only. Not to replace the advice of your health care provider. Copyright   2023 [employer]. All rights reserved. Clinically reviewed by the [facility] Transitions  Program. [employer] [id number] - REV 01/24.  Hearing Loss: Care Instructions  Overview     Hearing loss is a sudden or slow decrease in how well you hear. It can range from slight to profound. Permanent hearing loss can occur with aging. It also can happen when you are exposed long-term to loud noise. Examples include listening to loud music, riding motorcycles, or being around other loud machines.  Hearing loss can affect your work and home life. It can make you feel lonely or depressed. You may feel that you have lost your independence. But hearing aids and other devices can help you hear better and feel connected to others.  Follow-up care is a key part of your treatment and safety. Be sure to make and go to all appointments, and call your doctor if you are having problems. It's also a good idea to know your test results and keep a list of the medicines you take.  How can you care for yourself at home?  Avoid loud noises whenever possible. This helps keep your hearing from getting worse.  Always wear hearing protection around loud noises.  Wear a hearing aid as directed.  A professional can help you pick a hearing aid that will work best for you.  You can also get hearing aids over the counter for mild to moderate hearing loss.  Have hearing tests as your doctor suggests. They can show whether your hearing has changed. Your hearing aid may need to be adjusted.  Use other devices as needed. These may include:  Telephone amplifiers and hearing aids that can connect to a television, stereo, radio, or microphone.  Devices that use lights or vibrations. These alert you to the doorbell, a ringing telephone, or a baby monitor.  Television closed-captioning. This shows the words at the bottom of the screen. Most new TVs can do this.  TTY (text telephone). This lets you type messages back and forth on the telephone instead of talking or listening. These devices are also called TDD. When messages are typed on the keyboard,  "they are sent over the phone line to a receiving TTY. The message is shown on a monitor.  Use text messaging, social media, and email if it is hard for you to communicate by telephone.  Try to learn a listening technique called speechreading. It is not lipreading. You pay attention to people's gestures, expressions, posture, and tone of voice. These clues can help you understand what a person is saying. Face the person you are talking to, and have them face you. Make sure the lighting is good. You need to see the other person's face clearly.  Think about counseling if you need help to adjust to your hearing loss.  When should you call for help?  Watch closely for changes in your health, and be sure to contact your doctor if:    You think your hearing is getting worse.     You have new symptoms, such as dizziness or nausea.   Where can you learn more?  Go to https://www.[employer]/patiented  Enter R798 in the search box to learn more about \"Hearing Loss: Care Instructions.\"  Current as of: September 27, 2023               Content Version: 14.0    2670-5824 [employer].   Care instructions adapted under license by your healthcare professional. If you have questions about a medical condition or this instruction, always ask your healthcare professional. [employer] disclaims any warranty or liability for your use of this information.      Bladder Training: Care Instructions  Your Care Instructions     Bladder training is used to treat urge incontinence and stress incontinence. Urge incontinence means that the need to urinate comes on so fast that you can't get to a toilet in time. Stress incontinence means that you leak urine because of pressure on your bladder. For example, it may happen when you laugh, cough, or lift something heavy.  Bladder training can increase how long you can wait before you have to urinate. It can also help your bladder hold more urine. And it can give you better " control over the urge to urinate.  It is important to remember that bladder training takes a few weeks to a few months to make a difference. You may not see results right away, but don't give up.  Follow-up care is a key part of your treatment and safety. Be sure to make and go to all appointments, and call your doctor if you are having problems. It's also a good idea to know your test results and keep a list of the medicines you take.  How can you care for yourself at home?  Work with your doctor to come up with a bladder training program that is right for you. You may use one or more of the following methods.  Delayed urination  In the beginning, try to keep from urinating for 5 minutes after you first feel the need to go.  While you wait, take deep, slow breaths to relax. Kegel exercises can also help you delay the need to go to the bathroom.  After some practice, when you can easily wait 5 minutes to urinate, try to wait 10 minutes before you urinate.  Slowly increase the waiting period until you are able to control when you have to urinate.  Scheduled urination  Empty your bladder when you first wake up in the morning.  Schedule times throughout the day when you will urinate.  Start by going to the bathroom every hour, even if you don't need to go.  Slowly increase the time between trips to the bathroom.  When you have found a schedule that works well for you, keep doing it.  If you wake up during the night and have to urinate, do it. Apply your schedule to waking hours only.  Kegel exercises  These tighten and strengthen pelvic muscles, which can help you control the flow of urine. (If doing these exercises causes pain, stop doing them and talk with your doctor.) To do Kegel exercises:  Squeeze your muscles as if you were trying not to pass gas. Or squeeze your muscles as if you were stopping the flow of urine. Your belly, legs, and buttocks shouldn't move.  Hold the squeeze for 3 seconds, then relax for 5 to  "10 seconds.  Start with 3 seconds, then add 1 second each week until you are able to squeeze for 10 seconds.  Repeat the exercise 10 times a session. Do 3 to 8 sessions a day.  When should you call for help?  Watch closely for changes in your health, and be sure to contact your doctor if:    Your incontinence is getting worse.     You do not get better as expected.   Where can you learn more?  Go to https://www.[employer].net/patiented  Enter V684 in the search box to learn more about \"Bladder Training: Care Instructions.\"  Current as of: November 15, 2023               Content Version: 14.0    4188-1053 [employer].   Care instructions adapted under license by your healthcare professional. If you have questions about a medical condition or this instruction, always ask your healthcare professional. [employer] disclaims any warranty or liability for your use of this information.         "

## 2024-07-29 NOTE — PROGRESS NOTES
Preventive Care Visit  [facility]  Perla Garsia MD, Family Medicine  Jul 29, 2024      Assessment & Plan     Encounter for Medicare annual wellness exam  Reviewed/updated Health Maintenance     Lipid screening  - Lipid panel reflex to direct LDL Fasting    Screening for diabetes mellitus  - Glucose    Encounter for screening mammogram for breast cancer  - MA Screening Bilateral w/ Juancarlos        Counseling  Appropriate preventive services were addressed with this patient via screening, questionnaire, or discussion as appropriate for fall prevention, nutrition, physical activity, Tobacco-use cessation, weight loss and cognition.  Checklist reviewing preventive services available has been given to the patient.  Reviewed patient's diet, addressing concerns and/or questions.   The patient was provided with written information regarding signs of hearing loss.   Information on urinary incontinence and treatment options given to patient.     See Patient Instructions        Gabbie Gutierres is a 69 year old, presenting for the following:  Medicare Visit        7/29/2024     9:50 AM   Additional Questions   Roomed by Sima Martin       Health Care Directive  Patient has a Health Care Directive on file  Advance care planning document is on file and is current.    HPI          7/28/2024   General Health   How would you rate your overall physical health? (!) FAIR   Feel stress (tense, anxious, or unable to sleep) To some extent      (!) STRESS CONCERN      7/28/2024   Nutrition   Diet: Other   If other, please elaborate: sensitive to or allergic to a number of foods            7/28/2024   Exercise   Days per week of moderate/strenous exercise 0 days   Average minutes spent exercising at this level 0 min      (!) EXERCISE CONCERN      7/28/2024   Social Factors   Frequency of gathering with friends or relatives More than three times a week   Worry food won't last until get money to buy more No     No   Food not last or not have enough money for food? No    No   Do you have housing? (Housing is defined as stable permanent housing and does not include staying ouside in a car, in a tent, in an abandoned building, in an overnight shelter, or couch-surfing.) Yes    Yes   Are you worried about losing your housing? No    No   Lack of transportation? No    No   Unable to get utilities (heat,electricity)? No    No       Multiple values from one day are sorted in reverse-chronological order         7/28/2024   Fall Risk   Fallen 2 or more times in the past year? No    No   Trouble with walking or balance? No    No       Multiple values from one day are sorted in reverse-chronological order          7/28/2024   Activities of Daily Living- Home Safety   Needs help with the following daily activites None of the above   Safety concerns in the home None of the above            7/28/2024   Dental   Dentist two times every year? Yes            7/28/2024   Hearing Screening   Hearing concerns? (!) I NEED TO ASK PEOPLE TO SPEAK UP OR REPEAT THEMSELVES.    (!) IT'S HARD TO FOLLOW A CONVERSATION IN A NOISY RESTAURANT OR CROWDED ROOM.    (!) TROUBLE UNDESTANDING A SPEAKER IN A PUBLIC MEETING OR [employer] SERVICE.    (!) TROUBLE FOLLOWING DIALOGUE IN THE THEATHER.    (!) TROUBLE UNDERSTANDING SOFT OR WHISPERED SPEECH.    (!) TROUBLE UNDERSTANDING SPEECH ON THE TELEPHONE       Multiple values from one day are sorted in reverse-chronological order         7/28/2024   Driving Risk Screening   Patient/family members have concerns about driving No            7/28/2024   General Alertness/Fatigue Screening   Have you been more tired than usual lately? No            7/28/2024   Urinary Incontinence Screening   Bothered by leaking urine in past 6 months Yes            7/28/2024   TB Screening   Were you born outside of the [place]? No        Today's PHQ-2 Score:       7/28/2024     1:06 PM   PHQ-2 ( 1999 [employer])   Q1: Little interest or  pleasure in doing things 0   Q2: Feeling down, depressed or hopeless 1   PHQ-2 Score 1   Q1: Little interest or pleasure in doing things Not at all   Q2: Feeling down, depressed or hopeless Several days   PHQ-2 Score 1           7/28/2024   Substance Use   Alcohol more than 3/day or more than 7/wk Not Applicable   Do you have a current opioid prescription? No   How severe/bad is pain from 1 to 10? 1/10   Do you use any other substances recreationally? No        Social History     Tobacco Use    Smoking status: Never    Smokeless tobacco: Never   Vaping Use    Vaping status: Never Used   Substance Use Topics    Alcohol use: Not Currently     Comment: 1-2 drinks per month    Drug use: No           10/24/2022   LAST FHS-7 RESULTS   1st degree relative breast or ovarian cancer No   Any relative bilateral breast cancer No   Any male have breast cancer No   Any ONE woman have BOTH breast AND ovarian cancer No   Any woman with breast cancer before 50yrs No   2 or more relatives with breast AND/OR ovarian cancer No   2 or more relatives with breast AND/OR bowel cancer No           Mammogram Screening - Mammogram every 1-2 years updated in Health Maintenance based on mutual decision making      History of abnormal Pap smear: YES - reflected in Problem List and Health Maintenance accordingly        Latest Ref Rng & Units 2/22/2024     4:34 PM 2/22/2021    10:15 AM 2/22/2021    10:05 AM   PAP / HPV   PAP  Negative for Intraepithelial Lesion or Malignancy (NILM)      PAP (Historical)    ASC-US    HPV 16 DNA Negative Negative  Negative     HPV 18 DNA Negative Negative  Negative     Other HR HPV Negative Negative  Negative       ASCVD Risk   The 10-year ASCVD risk score (Lisha LEIGH, et al., 2019) is: 11.7%    Values used to calculate the score:      Age: 69 years      Sex: Female      Is Non- : No      Diabetic: No      Tobacco smoker: No      Systolic Blood Pressure: 154 mmHg      Is BP treated:  No      HDL Cholesterol: 42 mg/dL      Total Cholesterol: 200 mg/dL      Reviewed and updated as needed this visit by Provider   Tobacco  Allergies  Meds  Problems  Med Hx  Surg Hx  Fam Hx     Sexual Activity          BP Readings from Last 3 Encounters:   07/29/24 (!) 154/89   07/16/24 (!) 153/82   03/14/24 110/78    Wt Readings from Last 3 Encounters:   07/29/24 82.6 kg (182 lb)   07/16/24 83 kg (183 lb)   03/14/24 85.8 kg (189 lb 3.2 oz)              Patient Active Problem List   Diagnosis    Allergic rhinitis    Uterine fibroid    Overweight (BMI 25.0-29.9)    Dyslipidemia (high LDL; low HDL)    Elevated triglycerides with high cholesterol    GERD (gastroesophageal reflux disease)    Lipoma of skin and subcutaneous tissue    Difficulty hearing    Seborrheic keratoses    THOMAS (generalized anxiety disorder)    Chemical sensitivity    ASCUS of cervix with negative high risk HPV    Elevated blood pressure reading without diagnosis of hypertension    Lesion of skin of scalp    Fibromyalgia    CFS (chronic fatigue syndrome)    Postherpetic neuralgia     Past Surgical History:   Procedure Laterality Date    COLONOSCOPY  08/01/2011    COLONOSCOPY N/A 02/28/2023    Procedure: COLONOSCOPY;  Surgeon: Romain Denney MD;  [place] [facility] [place]    ESOPHAGOSCOPY, GASTROSCOPY, DUODENOSCOPY (EGD), COMBINED  07/02/2013    Procedure: COMBINED ESOPHAGOSCOPY, GASTROSCOPY, DUODENOSCOPY (EGD), BIOPSY SINGLE OR MULTIPLE;  COMBINED ESOPHAGOSCOPY, GASTROSCOPY, DUODENOSCOPY (EGD);  Surgeon: Carlos Jones MD;  Location:  GI    EYE SURGERY  9/2017    cataract    GYN SURGERY  09/2010    D&C    SOFT TISSUE SURGERY  1976    removal of ganglion cyst, right hand    STRESS ECHO (METRO)  06/2006    in [facility]    TONSILLECTOMY & ADENOIDECTOMY      T & A       Social History     Tobacco Use    Smoking status: Never    Smokeless tobacco: Never   Substance Use Topics    Alcohol use: Not Currently     Comment: 1-2 drinks per  month     Family History   Problem Relation Age of Onset    C.A.D. Father     Lipids Father     Coronary Artery Disease Father         , age 52    Mental Illness Father     Gallbladder Disease Father         and mgm    Arthritis Mother     Hypertension Mother         , age 83    Thyroid Disease Mother     Arrhythmia Mother         atrial fibrulation    Cerebrovascular Disease Mother         atrial fibrulation    Cancer Paternal Grandmother         liver cancer    Breast Cancer Paternal Grandmother         , age 67    Mental Illness Paternal Grandmother     Lipids Sister     Lipids Sister     Hypertension Maternal Grandmother         , age 96    Hyperlipidemia Sister     Anesthesia Reaction Sister     Asthma Sister     Cerebrovascular Disease Other     Diabetes Other         maternal great grandma    Asthma Sister     Cerebrovascular Disease Other         maternal cousin    Diabetes Other          Current providers sharing in care for this patient include:  Patient Care Team:  Perla Garsia MD as PCP - General (Family Medicine)  Perla Garsia MD as Assigned PCP  Heriberto Macdonald LICSW as Assigned Behavioral Health Provider  Brayden Flood DO as Physician (Neurology)    The following health maintenance items are reviewed in Epic and correct as of today:  Health Maintenance   Topic Date Due    Pneumococcal Vaccine: 65+ Years (1 of 2 - PCV) Never done    ZOSTER IMMUNIZATION (1 of 2) Never done    ASTHMA ACTION PLAN  2013    RSV VACCINE (Pregnancy & 60+) (1 - 1-dose 60+ series) Never done    ANNUAL REVIEW OF HM ORDERS  2022    COVID-19 Vaccine ( - - season) 2023    GLUCOSE  2024    DEXA  2025 (Originally 1955)    INFLUENZA VACCINE (1) 2024    MAMMO SCREENING  10/24/2024    ASTHMA CONTROL TEST  2025    PAP FOLLOW-UP  2025    HPV FOLLOW-UP  2025    MEDICARE ANNUAL WELLNESS VISIT  2025    FALL RISK  "ASSESSMENT  07/29/2025    LIPID  10/14/2027    ADVANCE CARE PLANNING  07/29/2029    DTAP/TDAP/TD IMMUNIZATION (3 - Td or Tdap) 07/29/2031    COLORECTAL CANCER SCREENING  02/28/2033    HEPATITIS C SCREENING  Completed    PHQ-2 (once per calendar year)  Completed    IPV IMMUNIZATION  Aged Out    HPV IMMUNIZATION  Aged Out    MENINGITIS IMMUNIZATION  Aged Out    RSV MONOCLONAL ANTIBODY  Aged Out    PAP  Discontinued            Objective    Exam  BP (!) 154/89 (BP Location: Right arm, Patient Position: Sitting, Cuff Size: Adult Regular)   Pulse 65   Temp 97.4  F (36.3  C) (Temporal)   Resp 15   Ht 1.626 m (5' 4\")   Wt 82.6 kg (182 lb)   LMP 03/01/2008 (Exact Date)   SpO2 97%   BMI 31.24 kg/m     Estimated body mass index is 31.24 kg/m  as calculated from the following:    Height as of this encounter: 1.626 m (5' 4\").    Weight as of this encounter: 82.6 kg (182 lb).    Physical Exam  GENERAL: healthy, alert and no distress  EYES: Eyes grossly normal to inspection, conjunctivae and sclerae normal  HENT: ear canals and TM's normal, oropharynx clear with normal landmarks   NECK: no adenopathy, no asymmetry, masses, or scars and thyroid normal to palpation  RESP: lungs clear to auscultation - no rales, rhonchi or wheezes  CV: regular rate and rhythm, normal S1 S2, no S3 or S4, no murmur, click or rub  ABDOMEN: soft, nontender, no hepatosplenomegaly, no masses  MS: no gross musculoskeletal defects noted, no edema  SKIN: no suspicious lesions or rashes  NEURO: Grossly normal strength and tone, mentation intact and speech normal  PSYCH: mentation appears normal, affect normal/bright        7/29/2024   Mini Cog   Clock Draw Score 2 Normal   3 Item Recall 2 objects recalled   Mini Cog Total Score 4                  7/29/2024     9:54 AM   Vision Screening Results   Does the patient have corrective lenses (glasses/contacts)? Yes   Patient wears corrective lenses (select all that apply) NOT worn during vision screen   No " Corrective Lenses, PLUS LENS REQUIRED REFER   Vision Acuity Tool Aniket   RIGHT EYE 10/12.5 (20/25)   LEFT EYE 10/20 (20/40)   Is there a two line difference? YES   Vision Screen Results REFER        Signed Electronically by: Perla Garsia MD

## 2024-08-06 ENCOUNTER — LAB (OUTPATIENT)
Dept: LAB | Facility: CLINIC | Age: 69
End: 2024-08-06
Payer: COMMERCIAL

## 2024-08-06 DIAGNOSIS — Z13.220 LIPID SCREENING: ICD-10-CM

## 2024-08-06 DIAGNOSIS — Z13.1 SCREENING FOR DIABETES MELLITUS: ICD-10-CM

## 2024-08-06 LAB
CHOLEST SERPL-MCNC: 184 MG/DL
FASTING STATUS PATIENT QL REPORTED: YES
FASTING STATUS PATIENT QL REPORTED: YES
GLUCOSE SERPL-MCNC: 103 MG/DL (ref 70–99)
HDLC SERPL-MCNC: 40 MG/DL
LDLC SERPL CALC-MCNC: 120 MG/DL
NONHDLC SERPL-MCNC: 144 MG/DL
TRIGL SERPL-MCNC: 119 MG/DL

## 2024-08-06 PROCEDURE — 36415 COLL VENOUS BLD VENIPUNCTURE: CPT

## 2024-08-06 PROCEDURE — 82947 ASSAY GLUCOSE BLOOD QUANT: CPT

## 2024-08-06 PROCEDURE — 80061 LIPID PANEL: CPT

## 2024-08-07 NOTE — RESULT ENCOUNTER NOTE
Juan Gutierres,  Thanks for coming in for fasting labs.  Your fasting blood sugar is slightly elevated - to about the same degree as previously.  Your lipid panel (cholesterol) results are great (better than the past several years!).     For blood sugar control try cutting back on your carbohydrate intake:  Eat less sweets, bread, pasta, rice, and potatoes and more fruit, vegetables, and lean protein.  Also try taking a brief walk after meals - or any physical movement after meals.  This can also help control post-meal blood sugar surges and can decrease the risk of developing diabetes.     Perla Garsia MD

## 2024-08-09 ENCOUNTER — ANCILLARY PROCEDURE (OUTPATIENT)
Dept: BONE DENSITY | Facility: CLINIC | Age: 69
End: 2024-08-09
Attending: FAMILY MEDICINE
Payer: COMMERCIAL

## 2024-08-09 DIAGNOSIS — Z78.0 ASYMPTOMATIC POSTMENOPAUSAL STATUS: ICD-10-CM

## 2024-08-09 PROCEDURE — 77080 DXA BONE DENSITY AXIAL: CPT

## 2024-08-09 NOTE — RESULT ENCOUNTER NOTE
Juan Gutierres,  The results of your recent bone density scan were normal.      Try to get 3 servings of dairy products in your diet per day and take 1000 international units of Vitamin D per day.  (Each serving of dairy in your diet is about 300 mg calcium.  If you do not routinely get at least 2-3 servings of dairy daily you may need to take calcium supplements.)    Excercise daily to keep your bones strong. Thirty minutes of moderate walking or other weight-bearing activity is recommended at least 5 times per week.    Avoid smoking as this can increase your risk for decreased bone density (osteopenia and osteoporosis).  If you'd like help with smoking cessation please let me know.      If you are currently taking medication to prevent bone loss, please make an appointment to see me to discuss the need for continued medication therapy.     I recommend another bone density test (DEXA scan) in 10 years.    If you have any questions or concerns, please call the clinic.       Perla Garsia MD

## 2024-08-26 NOTE — TELEPHONE ENCOUNTER
RECORDS RECEIVED FROM: Internal    REASON FOR VISIT: Fasciculations [R25.3], muscle twitching   PROVIDER: Brayden Flood DO   DATE OF APPT: 10/18/24 @ 9:30 am    NOTES (FOR ALL VISITS) STATUS DETAILS   OFFICE NOTE from referring provider Internal 5/15/24 Perla Garsia MD @[facility]     MEDICATION LIST Internal    IMAGING  (FOR ALL VISITS)     MRI (HEAD, NECK, SPINE) N/A    CT (HEAD, NECK, SPINE) N/A

## 2024-09-24 ENCOUNTER — TRANSFERRED RECORDS (OUTPATIENT)
Dept: HEALTH INFORMATION MANAGEMENT | Facility: CLINIC | Age: 69
End: 2024-09-24
Payer: COMMERCIAL

## 2024-09-24 ENCOUNTER — PATIENT OUTREACH (OUTPATIENT)
Dept: CARE COORDINATION | Facility: CLINIC | Age: 69
End: 2024-09-24
Payer: COMMERCIAL

## 2024-09-30 ENCOUNTER — OFFICE VISIT (OUTPATIENT)
Dept: URGENT CARE | Facility: URGENT CARE | Age: 69
End: 2024-09-30
Payer: COMMERCIAL

## 2024-09-30 VITALS
HEIGHT: 65 IN | SYSTOLIC BLOOD PRESSURE: 148 MMHG | WEIGHT: 182 LBS | RESPIRATION RATE: 18 BRPM | DIASTOLIC BLOOD PRESSURE: 84 MMHG | TEMPERATURE: 98.2 F | OXYGEN SATURATION: 98 % | HEART RATE: 65 BPM | BODY MASS INDEX: 30.32 KG/M2

## 2024-09-30 DIAGNOSIS — B35.9 RINGWORM: Primary | ICD-10-CM

## 2024-09-30 PROCEDURE — 99203 OFFICE O/P NEW LOW 30 MIN: CPT | Performed by: PHYSICIAN ASSISTANT

## 2024-09-30 RX ORDER — CLOTRIMAZOLE AND BETAMETHASONE DIPROPIONATE 10; .64 MG/G; MG/G
CREAM TOPICAL 2 TIMES DAILY
Qty: 15 G | Refills: 0 | Status: SHIPPED | OUTPATIENT
Start: 2024-09-30

## 2024-09-30 NOTE — PROGRESS NOTES
SUBJECTIVE:   Nenita Goins is a 69 year old adult presenting with a chief complaint of   Chief Complaint   Patient presents with    Urgent Care    Derm Problem     Pt in clinic to have eval for rash on face and left arm.       Nenita is an established patient of [facility].  Patient presents with a mildly itch rash to left antecubital fossa and forearm.          Review of Systems   Skin:  Positive for rash.       Past Medical History:   Diagnosis Date    Abnormal Pap smear of cervix 2021    Allergic rhinitis     multiple chemical sensitivities    Anal fissure 2015    Anxiety disorder     Arthritis ??    it kind of crept up on me    Breast lump     Chemical sensitivity 2017    Multiple chemical sensitivities    Difficulty hearing 2012    Evaluated by audiology, mild, no hearing aids needed     Head injury 2013    Lactose intolerance     Mild intermittent asthma     Plantar tendonitis 2015    Uterine fibroid     multiple large fibroids on US 06. Not currently symptomatic.     Uterine fibroids     multiple large fibroids on US 06     Family History   Problem Relation Age of Onset    C.A.D. Father     Lipids Father     Coronary Artery Disease Father         , age 52    Mental Illness Father     Gallbladder Disease Father         and mgm    Arthritis Mother     Hypertension Mother         , age 83    Thyroid Disease Mother     Arrhythmia Mother         atrial fibrulation    Cerebrovascular Disease Mother         atrial fibrulation    Cancer Paternal Grandmother         liver cancer    Breast Cancer Paternal Grandmother         , age 67    Mental Illness Paternal Grandmother     Lipids Sister     Lipids Sister     Hypertension Maternal Grandmother         , age 96    Hyperlipidemia Sister     Anesthesia Reaction Sister     Asthma Sister     Cerebrovascular Disease Other     Diabetes Other         maternal great grandma    Asthma Sister  "    Cerebrovascular Disease Other         maternal cousin    Diabetes Other      Current Outpatient Medications   Medication Sig Dispense Refill    Calcium-Magnesium-Vitamin D (CALCIUM MAGNESIUM PO)       Coenzyme Q10 (CO Q 10) 10 MG CAPS       COMPOUNDED NON-CONTROLLED SUBSTANCE (CMPD RX) - PHARMACY TO MIX COMPOUNDED MEDICATION Ibuprofen capsule 200 mg every 4 hours prn painIbuprofen capsule 200 mg every 4 hours prn pain 50 each 2    LUTEIN PO       levalbuterol (XOPENEX HFA) 45 MCG/ACT inhaler Inhale 1-2 puffs into the lungs every 4 hours as needed for shortness of breath or wheezing (Patient not taking: Reported on 9/30/2024) 15 g 0     Social History     Tobacco Use    Smoking status: Never    Smokeless tobacco: Never   Substance Use Topics    Alcohol use: Not Currently     Comment: 1-2 drinks per month       OBJECTIVE  BP (!) 148/84   Pulse 65   Temp 98.2  F (36.8  C) (Temporal)   Resp 18   Ht 1.638 m (5' 4.5\")   Wt 82.6 kg (182 lb)   LMP 03/01/2008 (Exact Date)   SpO2 98%   BMI 30.76 kg/m      Physical Exam  Vitals reviewed.   Constitutional:       Appearance: Normal appearance. Nenita is normal weight.   Cardiovascular:      Rate and Rhythm: Normal rate.   Skin:     Comments: 3 circular rash to prox forearm and antecubital fossa, each about 2-3 cm   Neurological:      General: No focal deficit present.      Mental Status: Nenita is alert.   Psychiatric:         Mood and Affect: Mood normal.         Labs:  No results found for this or any previous visit (from the past 24 hour(s)).      ASSESSMENT:    No diagnosis found.     Medical Decision Making:    Differential Diagnosis:  Ringworm, eczema    Serious Comorbid Conditions:  Adult:   reviewed    PLAN:    Rx for lotrisone.  Try not to scratch.  Wash hands frequently.      Followup:    If not improving or if condition worsens, follow up with your Primary Care Provider, If not improving or if conditions worsens over the next 12-24 hours, go to the Emergency " Department    There are no Patient Instructions on file for this visit.

## 2024-10-09 ENCOUNTER — VIRTUAL VISIT (OUTPATIENT)
Dept: FAMILY MEDICINE | Facility: CLINIC | Age: 69
End: 2024-10-09
Payer: COMMERCIAL

## 2024-10-09 DIAGNOSIS — B35.4 TINEA CORPORIS: Primary | ICD-10-CM

## 2024-10-09 PROCEDURE — 99213 OFFICE O/P EST LOW 20 MIN: CPT | Mod: 95 | Performed by: FAMILY MEDICINE

## 2024-10-09 PROCEDURE — G2211 COMPLEX E/M VISIT ADD ON: HCPCS | Mod: 95 | Performed by: FAMILY MEDICINE

## 2024-10-09 NOTE — PROGRESS NOTES
"Nenita is a 69 year old who is being evaluated via a billable video visit.    How would you like to obtain your AVS? MyChart  If the video visit is dropped, the invitation should be resent by: Text to cell phone: [phone]  Will anyone else be joining your video visit? No      Assessment & Plan     Tinea corporis  She'll continue to use the clotrimazole and notify me if rash spreads or does not resolve.  Given her multiple chemical sensitivities I discussed that in the future we could have topical medications compounded into vanicream.  She will consider that.    I'm schedule to see her again later this month for follow-up after her neuro consult.  I'll be able to see any remaining rash at that time.    The longitudinal plan of care for the diagnosis(es)/condition(s) as documented were addressed during this visit. Due to the added complexity in care, I will continue to support Nenita in the subsequent management and with ongoing continuity of care.    Subjective   Nenita is a 69 year old, presenting for the following health issues:  ER F/U      10/9/2024    12:23 PM   Additional Questions   Roomed by ita     Video Start Time: 12:43 PM    History of Present Illness       Reason for visit:  Rash diagnosed as \"ringworm\"  Symptom onset:  3-7 days ago  Symptoms include:  Itching, red [place] larger than mosquito bite reaction  Symptom intensity:  Mild  Symptom progression:  Improving  Had these symptoms before:  No  What makes it worse:  Ingredients in the prescribed ointment caused separate kind of rash  What makes it better:  No   Nenita is taking medications regularly.     Left arm - 2 annular spots  Initially itchy but that has resolved.  Was seen in urgent care and was prescribed Lotrisone.  However she was concerned about the combo cream so started OTC clotrimazole 1% cream instead.  Concerned she's allergic to it because she developed tiny red spots where she applied the cream  Stopped using the cream for one day " and rash improved.  She has resumed it and so far has not had any further red dots.        Objective         Vitals:  No vitals were obtained today due to virtual visit.    Physical Exam   GENERAL: alert and no distress  EYES: Eyes grossly normal to inspection.  No discharge or erythema, or obvious scleral/conjunctival abnormalities.  RESP: No audible wheeze, cough, or visible cyanosis.    SKIN: Difficult to see rash as her camera keeps focusing on objects behind her arm.  She appears to have a faint annular ring on her inner proximal forearm.  She also has a red macule on her left cheek with no central clearing.  NEURO: Cranial nerves grossly intact.  Mentation and speech appropriate for age.  PSYCH: Appropriate affect, tone, and pace of words          Video-Visit Details    Type of service:  Video Visit   Video End Time:1:04 PM  Originating Location (pt. Location): Home  Distant Location (provider location):  Off-site  Platform used for Video Visit: Edita  Signed Electronically by: Perla Garsia MD

## 2024-10-15 ENCOUNTER — MYC MEDICAL ADVICE (OUTPATIENT)
Dept: FAMILY MEDICINE | Facility: CLINIC | Age: 69
End: 2024-10-15
Payer: COMMERCIAL

## 2024-10-15 NOTE — TELEPHONE ENCOUNTER
I didn't have concerns about her reports of fasciculations.  They are almost always benign and I never observed them while she was in clinic.  We did check CK (muscle enzyme) level which was normal.    However, the more definitive test is an EMG.  (That is something that only neuro can order and would only be done after a full neuro exam.)     So it really depends on how worried she is.  Since she mentioned her concerns about this to me on multiple occasions I felt she needed more reassurance - from a specialist.    Perla Garsia MD  [facility]

## 2024-10-18 ENCOUNTER — OFFICE VISIT (OUTPATIENT)
Dept: NEUROLOGY | Facility: CLINIC | Age: 69
End: 2024-10-18
Attending: FAMILY MEDICINE
Payer: COMMERCIAL

## 2024-10-18 ENCOUNTER — PRE VISIT (OUTPATIENT)
Dept: NEUROLOGY | Facility: CLINIC | Age: 69
End: 2024-10-18

## 2024-10-18 VITALS — OXYGEN SATURATION: 96 % | HEART RATE: 73 BPM | SYSTOLIC BLOOD PRESSURE: 142 MMHG | DIASTOLIC BLOOD PRESSURE: 86 MMHG

## 2024-10-18 DIAGNOSIS — R25.3 FASCICULATIONS: ICD-10-CM

## 2024-10-18 PROCEDURE — 99205 OFFICE O/P NEW HI 60 MIN: CPT | Performed by: PSYCHIATRY & NEUROLOGY

## 2024-10-18 NOTE — LETTER
10/18/2024       RE: Nenita Goins  [address]  [facility] [address]     Dear Colleague,    Thank you for referring your patient, Nenita Goins, to the [facility] [place] [facility]. Please see a copy of my visit note below.    [facility] Neurology Consultation    Nenita Goins MRN# [id number]   Age: 69 year old YOB: 1955     Requesting physician: Perla Carter     Reason for Consultation: muscle fasciculations      History of Presenting Symptoms:   Nenita Goins is a 69 year old adult who presents today for evaluation of muscle fasciculations.  The patient has a pertinent medical history of arthritis, THOMAS, HLD, CFS/Fibromyalgia, and reports of post-herpetic neuralgia (shingles  3/16/2023, left facial + conjunctivitis).      The patient was seen with her PCP 5/15/2024 for general follow up.  During the visit, she expressed concern about fasciculations being present.   The fasciculations are described as lip tremor, of the left side, briefly occurring and leading to lip bites and slurred words).    Today, the patient feels her left sided post-herpetic neuralgia is better over the last year.  She does still get some spasms/wobbles on her left side of the face, near her corner of the mouth.  This happens mostly when getting tired over the day.  She also describes having sensitivity to touch on her left face, scalp of forehead, and near left jaw.  This type of sensation to light touch and vibration is noticeable with provocation, but at rest isn't that bothersome.      She also feels she looses words more than she would expect for her age.  Some of this happens when exercising or going through the day (towards the end).  She does feel she is hard of hearing.  When getting up in the AM she doesn't feel she is thinking right, which often gets better after eating breakfast or drinking some  glasses of water.  She has noticed that she may fumble with finding the right word to say, often a name of something or a person.  She doesn't act out dreams in sleep.  She doesn't have visual hallucinations.  She may have issues with driving if tired, but when not tired she doesn't get lost in familiar environment.  She doesn't have any issues with payment of bills or finances.  She shops for herself, manages her dress/hygiene, and schedule.  She does feel she doesn't have others telling her of concern for her cognitive abilities.    She expresses having massive body pain that is generalized when pushing to hard through the day, as well as fatigue that happens in the same way.       Social History:   Masters degree. No major smoking or alcohol abuse history. Lives with her long term partner.      Medications:   No major medications at this time     Physical Exam:   Vitals: BP (!) 142/86 (BP Location: Right arm, Patient Position: Sitting, Cuff Size: Adult Regular)   Pulse 73   LMP 03/01/2008 (Exact Date)   SpO2 96%    General: Seated comfortably in no acute distress.  HEENT: Optic discs sharp and vasculature normal on funduscopic exam.   Neurologic:     Mental Status: Fully alert, attentive and oriented. Speech clear and fluent, no paraphasic errors.   MoCA: 29/30  - visualspatial/executive: 5/5  - naming: 3/3  - recall: 4/5 (recalls face with multiple choice)  - attention: 6/6  - language: 3/3  - abstraction: 2/2  - orientation: 5/5       Cranial Nerves: Visual fields intact. PERRL. EOMI with normal smooth pursuit. Facial sensation intact/symmetric. Facial movements symmetric, but at rest there is slight droop of corner of lip, and nasolabial fold flattening. Can open eyes fully, and close eyes fully with resistance to force. No pain or sensation change posterior auricular.  Hearing not formally tested but intact to conversation. Palate elevation symmetric, uvula midline. No dysarthria with individual phoneme  and rapid alternation of phonemes. Shoulder shrug strong bilaterally. Tongue protrusion midline.     Motor: No tremors or other abnormal movements observed. Muscle tone normal throughout. No pronator drift. Normal/symmetric rapid finger tapping. Strength 5/5 throughout upper and lower extremities.     Deep Tendon Reflexes: 2+/symmetric throughout upper and lower extremities. No clonus. Toes downgoing bilaterally.     Sensory: Intact/symmetric to light touch, vibration throughout upper and lower extremities. Negative Romberg.      Coordination: Finger-nose-finger intact without dysmetria. Rapid alternating movements intact/symmetric with normal speed and rhythm.     Gait: Normal, steady casual gait.         Data: Pertinent prior to visit   Imaging:  No pertinent imaging upon review    Laboratory:  11/16/2023:  Vitamin D, Ferritin, TSH, CBC ~ normal    10/14/2022:  CK ~ 142         Assessment and Plan:   Assessment:  - Post-herpetic neuralgia on left side (v1 primary distribution, possibly V2/3 with motor involvement)  - subjective cognitive changes, normal changes of aging    The patient's left sided allodynia, and facial droop with fatigue is likely related to her herpetic neuralgia.  She and I discussed possible interventions, such as botox, capsaicin creams, and medications for pain.  At this time she is not necessarily interested in treatment, as her symptoms aren't bothersome, but was understanding that if they became so, that she should return to clinic for consideration of treatment.  Given the clinical presentation, and lack of other findings on exam, I am not necessarily concerned for nerve sheath tumor, meningioma, brainstem injury, or vascular malformation as possible alternative etiologies requiring further diagnostics.    For her cognitive concerns, mostly limited to word finding, and fluctuations of fatigue, I think her normal MoCA and absence of progression into functional impairments or other's in her  life noticing changes are all reassuring signs of normal aging.  We discussed what symptoms would be concerning for dementia, or alternative issues like stroke/vascular, infection, or trauma, and that at this time the best next steps for her would be monitoring with screening through her PCP by use of MoCA or MiniCOG at yearly appointments for her general health.     Plan:  Follow up in Neurology clinic should new concerns arise.    PHILLIP Flood D.O.   of Neurology    Total time today (62 min) in this patient encounter was spent on pre-charting, counseling and/or coordination of care.  The patient is in agreement with this plan and has no further questions.      Again, thank you for allowing me to participate in the care of your patient.      Sincerely,    Brayden Flood, DO

## 2024-10-18 NOTE — PROGRESS NOTES
[facility] Neurology Consultation    Nenita Goins MRN# [id number]   Age: 69 year old YOB: 1955     Requesting physician: Perla Carter     Reason for Consultation: muscle fasciculations      History of Presenting Symptoms:   Nenita Goins is a 69 year old adult who presents today for evaluation of muscle fasciculations.  The patient has a pertinent medical history of arthritis, THOMAS, HLD, CFS/Fibromyalgia, and reports of post-herpetic neuralgia (shingles  3/16/2023, left facial + conjunctivitis).      The patient was seen with her PCP 5/15/2024 for general follow up.  During the visit, she expressed concern about fasciculations being present.   The fasciculations are described as lip tremor, of the left side, briefly occurring and leading to lip bites and slurred words).    Today, the patient feels her left sided post-herpetic neuralgia is better over the last year.  She does still get some spasms/wobbles on her left side of the face, near her corner of the mouth.  This happens mostly when getting tired over the day.  She also describes having sensitivity to touch on her left face, scalp of forehead, and near left jaw.  This type of sensation to light touch and vibration is noticeable with provocation, but at rest isn't that bothersome.      She also feels she looses words more than she would expect for her age.  Some of this happens when exercising or going through the day (towards the end).  She does feel she is hard of hearing.  When getting up in the AM she doesn't feel she is thinking right, which often gets better after eating breakfast or drinking some glasses of water.  She has noticed that she may fumble with finding the right word to say, often a name of something or a person.  She doesn't act out dreams in sleep.  She doesn't have visual hallucinations.  She may have issues with driving if tired, but when not tired she doesn't get lost in familiar environment.  She doesn't  have any issues with payment of bills or finances.  She shops for herself, manages her dress/hygiene, and schedule.  She does feel she doesn't have others telling her of concern for her cognitive abilities.    She expresses having massive body pain that is generalized when pushing to hard through the day, as well as fatigue that happens in the same way.       Social History:   Masters degree. No major smoking or alcohol abuse history. Lives with her long term partner.      Medications:   No major medications at this time     Physical Exam:   Vitals: BP (!) 142/86 (BP Location: Right arm, Patient Position: Sitting, Cuff Size: Adult Regular)   Pulse 73   LMP 03/01/2008 (Exact Date)   SpO2 96%    General: Seated comfortably in no acute distress.  HEENT: Optic discs sharp and vasculature normal on funduscopic exam.   Neurologic:     Mental Status: Fully alert, attentive and oriented. Speech clear and fluent, no paraphasic errors.   MoCA: 29/30  - visualspatial/executive: 5/5  - naming: 3/3  - recall: 4/5 (recalls face with multiple choice)  - attention: 6/6  - language: 3/3  - abstraction: 2/2  - orientation: 5/5       Cranial Nerves: Visual fields intact. PERRL. EOMI with normal smooth pursuit. Facial sensation intact/symmetric. Facial movements symmetric, but at rest there is slight droop of corner of lip, and nasolabial fold flattening. Can open eyes fully, and close eyes fully with resistance to force. No pain or sensation change posterior auricular.  Hearing not formally tested but intact to conversation. Palate elevation symmetric, uvula midline. No dysarthria with individual phoneme and rapid alternation of phonemes. Shoulder shrug strong bilaterally. Tongue protrusion midline.     Motor: No tremors or other abnormal movements observed. Muscle tone normal throughout. No pronator drift. Normal/symmetric rapid finger tapping. Strength 5/5 throughout upper and lower extremities.     Deep Tendon Reflexes:  2+/symmetric throughout upper and lower extremities. No clonus. Toes downgoing bilaterally.     Sensory: Intact/symmetric to light touch, vibration throughout upper and lower extremities. Negative Romberg.      Coordination: Finger-nose-finger intact without dysmetria. Rapid alternating movements intact/symmetric with normal speed and rhythm.     Gait: Normal, steady casual gait.         Data: Pertinent prior to visit   Imaging:  No pertinent imaging upon review    Laboratory:  11/16/2023:  Vitamin D, Ferritin, TSH, CBC ~ normal    10/14/2022:  CK ~ 142         Assessment and Plan:   Assessment:  - Post-herpetic neuralgia on left side (v1 primary distribution, possibly V2/3 with motor involvement)  - subjective cognitive changes, normal changes of aging    The patient's left sided allodynia, and facial droop with fatigue is likely related to her herpetic neuralgia.  She and I discussed possible interventions, such as botox, capsaicin creams, and medications for pain.  At this time she is not necessarily interested in treatment, as her symptoms aren't bothersome, but was understanding that if they became so, that she should return to clinic for consideration of treatment.  Given the clinical presentation, and lack of other findings on exam, I am not necessarily concerned for nerve sheath tumor, meningioma, brainstem injury, or vascular malformation as possible alternative etiologies requiring further diagnostics.    For her cognitive concerns, mostly limited to word finding, and fluctuations of fatigue, I think her normal MoCA and absence of progression into functional impairments or other's in her life noticing changes are all reassuring signs of normal aging.  We discussed what symptoms would be concerning for dementia, or alternative issues like stroke/vascular, infection, or trauma, and that at this time the best next steps for her would be monitoring with screening through her PCP by use of MoCA or MiniCOG at  yearly appointments for her general health.     Plan:  Follow up in Neurology clinic should new concerns arise.    PHILLIP Flood D.O.   of Neurology    Total time today (62 min) in this patient encounter was spent on pre-charting, counseling and/or coordination of care.  The patient is in agreement with this plan and has no further questions.

## 2024-10-18 NOTE — PATIENT INSTRUCTIONS
"You could consider alpha lipoic acid 600 mg a day for a natural neurological recovery treatment, but this is not necessarily proven to work for post-herpetic disease (studies are based on diabetic neuropathy).    Your MoCA was 29/30, and your cognitive issues do not sound like more than normal aging.  However, if you do note changes occurring over the next year without stress as an impact factor, you should return to clinic for repeat MoCA.    \"INTRODUCTION   Varicella-zoster virus (VZV) is the causative agent of varicella, or \"chickenpox,\" and herpes zoster, or \"shingles.\" Acute herpes zoster typically presents with a rash that is painful but self-limited. Some patients may continue to experience pain for months to years after the resolution of the rash, a condition known as postherpetic neuralgia (PHN).  The pathogenesis, epidemiology, clinical features, diagnosis, and treatment of PHN are reviewed here. Other aspects of VZV and the acute therapy of herpes zoster infection are discussed separately. (See \"Epidemiology, clinical manifestations, and diagnosis of herpes zoster\" and \"Treatment of herpes zoster\".)  PATHOGENESIS   Inflammation associated with acute herpes zoster may induce fibrosis and other structural changes in nerves that result in spontaneous activity capable of maintaining pain in the absence of ongoing tissue damage [1-5]. Changes that may contribute to the persistent pain of PHN include spinal or ganglionic hyperexcitability and post-infectious alterations in neuronal gene expression [6,7].  Acute herpes zoster that precedes PHN is caused by reactivation of the varicella-zoster virus (VZV). Herpes zoster is characterized by hemorrhagic inflammation of the peripheral nerve, dorsal root, and dorsal root ganglion. As cellular immunity wanes with age or immunocompromise, the virus may be transported along peripheral nerves, producing an acute neuritis [8,9]. Extension centrally into the spinal cord " and leptomeninges has also been described [10]. Subsequent fibrosis has been reported in the dorsal root ganglion, nerve root, and peripheral nerve following resolution of the acute process [11,12].  There are three phases of pain associated with herpes zoster [13-15]:  ?Acute herpetic neuralgia refers to pain preceding or accompanying the eruption of rash that may persist for up to 30 days.  ?Subacute herpetic neuralgia refers to pain that persists beyond healing of the rash but that resolves within three months of onset.  ?PHN refers to pain persisting beyond three months from the initial onset of the rash.  Autopsy data for cases of well-established PHN are limited. One study reported five cases, three with severe PHN and two with no persistent pain [16]. Findings in patients with persistent pain included dorsal horn atrophy as well as cellular, axonal, and myelin loss with fibrosis in the sensory ganglion. Marked axonal and myelin loss in the nerve and/or sensory root were found in patients both with and without persistent pain.  A search for responsible neurotransmitters in PHN has been difficult. Substance P is a peptide implicated in the transmission of pain signals, while serotonin and norepinephrine are neurotransmitters thought to play a role in the inhibition of pain signals. However, studies of the affected dorsal horn and dermatomes from patients with PHN have failed to demonstrate differences in cytokine profiles, levels of neurotransmitters, or opiate receptors compared with the unaffected side [1,17,18].  Some have speculated that ongoing VZV viral replication after an acute infection might be responsible for PHN [19]. However, in a randomized trial of 10 patients with severe PHN, intravenous and oral acyclovir given for 56 days failed to influence the course of PHN [20].  EPIDEMIOLOGY AND RISK FACTORS   The probability of developing PHN increases with age. The best incidence data in older patients  come from the placebo arm of a large randomized trial that evaluated vaccination against the varicella-zoster virus (VZV) [21]. In 334 patients 60 to 69 years of age who developed herpes zoster and were followed for a median of 3.1 years, PHN occurred in 6.9 percent. By contrast, among 308 patients age 70 years or older who developed herpes zoster, PHN occurred in 18.5 percent.  For adults younger than 60 years of age with herpes zoster, the risk of PHN is estimated to be less than 2 percent [22]. PHN is rare in children.  The major risk factors for PHN are [23-27]:  ?Age >60 years  ?Severe or incapacitating pain with acute herpes zoster  ?More severe rash with acute herpes zoster  Older age is also associated with increasing severity and persistence of PHN symptoms [28]. The risk of PHN may increase with immunosuppression, although the evidence is not entirely consistent [27,29,30].  CLINICAL MANIFESTATIONS   In most cases, PHN is a continuation of the pain that developed during an acute episode of herpes zoster and never resolved. However, in some cases, PHN pain may develop months to years after resolution of the initial acute event [31]. These episodes occur in the same distribution as the initial rash and are typically precipitated by a specific trigger (eg, a surgical procedure, tooth abscess).  The pain associated with PHN can be burning, pruritic, sharp, or stabbing and constant or intermittent [13,32]. More than 90 percent of patients with PHN also report allodynia (sensation of pain evoked by normally nonpainful stimuli such as light touch) [33].  The patient with PHN typically involves a specific nerve and localizes to a dermatome (figure 1). Thoracic (typically T4 to T6), cervical, and trigeminal nerves are most commonly affected with PHN [34]. Patients with PHN often report sensory deficits within the affected dermatomes including areas of anesthesia or other specified deficits of thermal, tactile,  "pinprick, or vibratory sensation [33]. Sensory deficits may extend beyond dermatomal margins, but the contralateral dermatomes are unimpaired. Spontaneous pain may predominate in cutaneous regions with lost or impaired sensation while allodynia may be triggered in other regions with relatively preserved sensation.  On examination, the areas affected by PHN may show scarring related to the vesicular eruption of the preceding acute herpes zoster infection or by areas of excoriation caused by scratching.  The pain associated with acute herpes zoster and PHN can be severe and associated with profound psychosocial dysfunction including impaired sleep, decreased appetite, and diminished libido [32,35].\"  "

## 2024-10-22 ENCOUNTER — PATIENT OUTREACH (OUTPATIENT)
Dept: CARE COORDINATION | Facility: CLINIC | Age: 69
End: 2024-10-22
Payer: COMMERCIAL

## 2024-10-31 ENCOUNTER — OFFICE VISIT (OUTPATIENT)
Dept: FAMILY MEDICINE | Facility: CLINIC | Age: 69
End: 2024-10-31
Payer: COMMERCIAL

## 2024-10-31 VITALS
HEART RATE: 68 BPM | TEMPERATURE: 97.9 F | OXYGEN SATURATION: 97 % | SYSTOLIC BLOOD PRESSURE: 130 MMHG | DIASTOLIC BLOOD PRESSURE: 70 MMHG | BODY MASS INDEX: 31.21 KG/M2 | HEIGHT: 64 IN | RESPIRATION RATE: 20 BRPM | WEIGHT: 182.8 LBS

## 2024-10-31 DIAGNOSIS — R21 RASH: Primary | ICD-10-CM

## 2024-10-31 DIAGNOSIS — J45.20 MILD INTERMITTENT ASTHMA WITHOUT COMPLICATION: ICD-10-CM

## 2024-10-31 DIAGNOSIS — G93.32 CFS (CHRONIC FATIGUE SYNDROME): ICD-10-CM

## 2024-10-31 PROCEDURE — 99213 OFFICE O/P EST LOW 20 MIN: CPT | Performed by: FAMILY MEDICINE

## 2024-10-31 PROCEDURE — G2211 COMPLEX E/M VISIT ADD ON: HCPCS | Performed by: FAMILY MEDICINE

## 2024-10-31 RX ORDER — LEVALBUTEROL TARTRATE 45 UG/1
1-2 AEROSOL, METERED ORAL EVERY 4 HOURS PRN
Qty: 15 G | Refills: 1 | Status: SHIPPED | OUTPATIENT
Start: 2024-10-31

## 2024-10-31 ASSESSMENT — PAIN SCALES - GENERAL: PAINLEVEL_OUTOF10: NO PAIN (0)

## 2024-11-02 ENCOUNTER — MYC MEDICAL ADVICE (OUTPATIENT)
Dept: FAMILY MEDICINE | Facility: CLINIC | Age: 69
End: 2024-11-02
Payer: COMMERCIAL

## 2024-11-04 NOTE — TELEPHONE ENCOUNTER
Dr. Ady AMARO see [facility].    FLACO Malcolm, BSN, RN (she/her)  [facility] RN

## 2024-11-08 ENCOUNTER — DOCUMENTATION ONLY (OUTPATIENT)
Dept: OTHER | Facility: CLINIC | Age: 69
End: 2024-11-08
Payer: COMMERCIAL

## 2024-12-28 ENCOUNTER — HEALTH MAINTENANCE LETTER (OUTPATIENT)
Age: 69
End: 2024-12-28

## 2025-02-03 PROBLEM — R87.610 ASCUS OF CERVIX WITH NEGATIVE HIGH RISK HPV: Status: ACTIVE | Noted: 2021-02-22

## 2025-02-04 ENCOUNTER — OFFICE VISIT (OUTPATIENT)
Dept: FAMILY MEDICINE | Facility: CLINIC | Age: 70
End: 2025-02-04
Payer: COMMERCIAL

## 2025-02-04 VITALS
TEMPERATURE: 96.9 F | WEIGHT: 181 LBS | HEIGHT: 65 IN | RESPIRATION RATE: 16 BRPM | OXYGEN SATURATION: 100 % | BODY MASS INDEX: 30.16 KG/M2 | HEART RATE: 75 BPM

## 2025-02-04 DIAGNOSIS — L90.0 LICHEN SCLEROSUS: Primary | ICD-10-CM

## 2025-02-04 DIAGNOSIS — Z91.09 CHEMICAL SENSITIVITY: ICD-10-CM

## 2025-02-04 PROCEDURE — 99213 OFFICE O/P EST LOW 20 MIN: CPT | Performed by: FAMILY MEDICINE

## 2025-02-04 PROCEDURE — G2211 COMPLEX E/M VISIT ADD ON: HCPCS | Performed by: FAMILY MEDICINE

## 2025-02-04 ASSESSMENT — ASTHMA QUESTIONNAIRES
QUESTION_2 LAST FOUR WEEKS HOW OFTEN HAVE YOU HAD SHORTNESS OF BREATH: NOT AT ALL
ACT_TOTALSCORE: 25
QUESTION_5 LAST FOUR WEEKS HOW WOULD YOU RATE YOUR ASTHMA CONTROL: COMPLETELY CONTROLLED
QUESTION_4 LAST FOUR WEEKS HOW OFTEN HAVE YOU USED YOUR RESCUE INHALER OR NEBULIZER MEDICATION (SUCH AS ALBUTEROL): NOT AT ALL
QUESTION_1 LAST FOUR WEEKS HOW MUCH OF THE TIME DID YOUR ASTHMA KEEP YOU FROM GETTING AS MUCH DONE AT WORK, SCHOOL OR AT HOME: NONE OF THE TIME
ACT_TOTALSCORE: 25
QUESTION_3 LAST FOUR WEEKS HOW OFTEN DID YOUR ASTHMA SYMPTOMS (WHEEZING, COUGHING, SHORTNESS OF BREATH, CHEST TIGHTNESS OR PAIN) WAKE YOU UP AT NIGHT OR EARLIER THAN USUAL IN THE MORNING: NOT AT ALL

## 2025-02-04 ASSESSMENT — PAIN SCALES - GENERAL: PAINLEVEL_OUTOF10: NO PAIN (0)

## 2025-02-04 NOTE — PROGRESS NOTES
"  Assessment & Plan     Lichen sclerosus  Chemical sensitivity  Discussed lichen sclerosus.  Treatment is challenging due to the patient's multiple chemical sensitivities.  I discussed that we would typically treat this with strong topical steroid cream but she cannot use any commercially-produced topicals.  I therefore referred her to dermatology as she may need custom compounding.  She'll continue to avoid additional irritants - see patient instructions on vulvar care.  I also discussed importance of annual visual surveillance of the tissue due to risk of SCC.  - Adult Dermatology  Referral    The longitudinal plan of care for the diagnosis(es)/condition(s) as documented were addressed during this visit. Due to the added complexity in care, I will continue to support Nenita in the subsequent management and with ongoing continuity of care.      Gabbie Gutierres is a 69 year old, presenting for the following health issues:  Derm Problem        2/4/2025     8:53 AM   Additional Questions   Roomed by Sima Martin     History of Present Illness       Reason for visit:  Chronic itching on bottom, and unusual bruise  Symptom onset:  More than a month  Symptoms include:  Itching. No symptoms for the bruise other than visual  Symptom intensity:  Moderate  Symptom progression:  Staying the same  Had these symptoms before:  No  What makes it worse:  Unsure  What makes it better:  Unsure      Very itchy  Mostly between vagina and anus  Tried different toilet papers  Has tried using bidet            Objective    Pulse 75   Temp 96.9  F (36.1  C) (Temporal)   Resp 16   Ht 1.64 m (5' 4.57\")   Wt 82.1 kg (181 lb)   LMP 03/01/2008 (Exact Date)   SpO2 100%   BMI 30.53 kg/m    Body mass index is 30.53 kg/m .  Physical Exam   GEN:  no apparent distress  :  external exam reveals well-demarcated, very white plaque of perineum that is thin with fissures.  The remaining external exam is normal with normal labia, " clitoris, and urethral meatus.  No discharge.        Signed Electronically by: Perla Garsia MD

## 2025-03-06 ENCOUNTER — TRANSFERRED RECORDS (OUTPATIENT)
Dept: HEALTH INFORMATION MANAGEMENT | Facility: CLINIC | Age: 70
End: 2025-03-06
Payer: COMMERCIAL

## 2025-04-22 ENCOUNTER — PATIENT OUTREACH (OUTPATIENT)
Dept: CARE COORDINATION | Facility: CLINIC | Age: 70
End: 2025-04-22
Payer: COMMERCIAL

## 2025-05-27 NOTE — MR AVS SNAPSHOT
After Visit Summary   9/14/2017    Nenita Goins    MRN: [id number]           Patient Information     Date Of Birth          1955        Visit Information        Provider Department      9/14/2017 3:40 PM Perla Garsia MD [facility]        Today's Diagnoses     Preop general physical exam    -  1    Cataract of both eyes, unspecified cataract type        Chemical sensitivity        Need for prophylactic vaccination and inoculation against influenza          Care Instructions      Before Your Surgery      Call your surgeon if there is any change in your health. This includes signs of a cold or flu (such as a sore throat, runny nose, cough, rash or fever).    Do not smoke, drink alcohol or take over the counter medicine (unless your surgeon or primary care doctor tells you to) for the 24 hours before and after surgery.    If you take prescribed drugs: Follow your doctor s orders about which medicines to take and which to stop until after surgery.    Eating and drinking prior to surgery: follow the instructions from your surgeon    Take a shower or bath the night before surgery. Use the soap your surgeon gave you to gently clean your skin. If you do not have soap from your surgeon, use your regular soap. Do not shave or scrub the surgery site.  Wear clean pajamas and have clean sheets on your bed.           Follow-ups after your visit        Who to contact     If you have questions or need follow up information about today's clinic visit or your schedule please contact [facility] directly at [phone].  Normal or non-critical lab and imaging results will be communicated to you by MyChart, letter or phone within 4 business days after the clinic has received the results. If you do not hear from us within 7 days, please contact the clinic through MyChart or phone. If you have a critical or abnormal lab result, we will notify you by phone as soon as  "possible.  Submit refill requests through [employer] or call your pharmacy and they will forward the refill request to us. Please allow 3 business days for your refill to be completed.          Additional Information About Your Visit        [employer] Information     [employer] gives you secure access to your electronic health record. If you see a primary care provider, you can also send messages to your care team and make appointments. If you have questions, please call your primary care clinic.  If you do not have a primary care provider, please call [phone] and they will assist you.        Care EveryWhere ID     This is your Care EveryWhere ID. This could be used by other organizations to access your [place] medical records  [id number]        Your Vitals Were     Pulse Temperature Respirations Height Last Period Pulse Oximetry    80 97.8  F (36.6  C) (Oral) 16 5' 4.5\" (1.638 m) 03/01/2008 98%    Breastfeeding? BMI (Body Mass Index)                No 31.18 kg/m2           Blood Pressure from Last 3 Encounters:   09/14/17 119/84   07/20/17 123/75   10/27/16 122/80    Weight from Last 3 Encounters:   09/14/17 184 lb 8 oz (83.7 kg)   08/03/17 186 lb (84.4 kg)   07/20/17 186 lb 4 oz (84.5 kg)              Today, you had the following     No orders found for display         Today's Medication Changes          These changes are accurate as of: 9/14/17  4:32 PM.  If you have any questions, ask your nurse or doctor.               Stop taking these medicines if you haven't already. Please contact your care team if you have questions.     DANDELION ROOT PO   Stopped by:  Perla Garsia MD           magnesium citrate solution   Stopped by:  Perla Garsia MD           zinc sulfate 220 (50 ZN) MG capsule   Commonly known as:  ZINCATE   Stopped by:  Perla Garsia MD                    Primary Care Provider Office Phone # Fax #    Perla Garsia [phone] [address]  [facility] " [id number]        Equal Access to Services     [facility] NEIL : Hadii aad ku hadrobbysamir Jonatanali, wajoesteve canojuanha, hermantaiwo staufferkenmaureen vaz. So [facility] [phone].    ATENCIÓN: Si habla español, tiene a rogers disposición servicios gratuitos de asistencia lingüística. Llame al [phone].    We comply with applicable federal civil rights laws and [place] laws. We do not discriminate on the basis of race, color, national origin, age, disability sex, sexual orientation or gender identity.            Thank you!     Thank you for choosing [facility]  for your care. Our goal is always to provide you with excellent care. Hearing back from our patients is one way we can continue to improve our services. Please take a few minutes to complete the written survey that you may receive in the mail after your visit with us. Thank you!             Your Updated Medication List - Protect others around you: Learn how to safely use, store and throw away your medicines at www.disposemymeds.org.          This list is accurate as of: 9/14/17  4:32 PM.  Always use your most recent med list.                   Brand Name Dispense Instructions for use Diagnosis    calcium & magnesium carbonates 311-232 MG Tabs           COENZYME Q-10 PO      Take  by mouth.           How Did The Hair Loss Occur?: sudden in onset How Severe Is Your Hair Loss?: moderate

## 2025-06-30 ENCOUNTER — PATIENT OUTREACH (OUTPATIENT)
Dept: CARE COORDINATION | Facility: CLINIC | Age: 70
End: 2025-06-30
Payer: COMMERCIAL

## 2025-08-30 SDOH — HEALTH STABILITY: PHYSICAL HEALTH: ON AVERAGE, HOW MANY MINUTES DO YOU ENGAGE IN EXERCISE AT THIS LEVEL?: 20 MIN

## 2025-08-30 SDOH — HEALTH STABILITY: PHYSICAL HEALTH: ON AVERAGE, HOW MANY DAYS PER WEEK DO YOU ENGAGE IN MODERATE TO STRENUOUS EXERCISE (LIKE A BRISK WALK)?: 2 DAYS

## 2025-08-30 ASSESSMENT — ASTHMA QUESTIONNAIRES
QUESTION_4 LAST FOUR WEEKS HOW OFTEN HAVE YOU USED YOUR RESCUE INHALER OR NEBULIZER MEDICATION (SUCH AS ALBUTEROL): NOT AT ALL
QUESTION_5 LAST FOUR WEEKS HOW WOULD YOU RATE YOUR ASTHMA CONTROL: COMPLETELY CONTROLLED
ACT_TOTALSCORE: 25
QUESTION_2 LAST FOUR WEEKS HOW OFTEN HAVE YOU HAD SHORTNESS OF BREATH: NOT AT ALL
QUESTION_3 LAST FOUR WEEKS HOW OFTEN DID YOUR ASTHMA SYMPTOMS (WHEEZING, COUGHING, SHORTNESS OF BREATH, CHEST TIGHTNESS OR PAIN) WAKE YOU UP AT NIGHT OR EARLIER THAN USUAL IN THE MORNING: NOT AT ALL
QUESTION_1 LAST FOUR WEEKS HOW MUCH OF THE TIME DID YOUR ASTHMA KEEP YOU FROM GETTING AS MUCH DONE AT WORK, SCHOOL OR AT HOME: NONE OF THE TIME

## 2025-09-04 ENCOUNTER — OFFICE VISIT (OUTPATIENT)
Dept: FAMILY MEDICINE | Facility: CLINIC | Age: 70
End: 2025-09-04
Payer: COMMERCIAL

## 2025-09-04 VITALS
BODY MASS INDEX: 31.26 KG/M2 | RESPIRATION RATE: 18 BRPM | OXYGEN SATURATION: 99 % | WEIGHT: 183.1 LBS | HEIGHT: 64 IN | HEART RATE: 72 BPM | SYSTOLIC BLOOD PRESSURE: 149 MMHG | TEMPERATURE: 96.8 F | DIASTOLIC BLOOD PRESSURE: 88 MMHG

## 2025-09-04 DIAGNOSIS — Z12.31 VISIT FOR SCREENING MAMMOGRAM: ICD-10-CM

## 2025-09-04 DIAGNOSIS — R03.0 ELEVATED BLOOD PRESSURE READING WITHOUT DIAGNOSIS OF HYPERTENSION: ICD-10-CM

## 2025-09-04 DIAGNOSIS — Z13.1 SCREENING FOR DIABETES MELLITUS: ICD-10-CM

## 2025-09-04 DIAGNOSIS — H61.21 CERUMINOSIS, RIGHT: ICD-10-CM

## 2025-09-04 DIAGNOSIS — Z00.00 ENCOUNTER FOR MEDICARE ANNUAL WELLNESS EXAM: Primary | ICD-10-CM

## (undated) DEVICE — KIT ENDO FIRST STEP DISINFECTANT 200ML W/POUCH EP-4

## (undated) DEVICE — SNARE CAPIVATOR ROUND COLD SNR BX10 M00561101

## (undated) DEVICE — SUCTION MANIFOLD NEPTUNE 2 SYS 1 PORT 702-025-000

## (undated) DEVICE — GOWN IMPERVIOUS 2XL BLUE

## (undated) DEVICE — SOL WATER IRRIG 500ML BOTTLE 2F7113

## (undated) DEVICE — TUBING SUCTION 12"X1/4" N612

## (undated) DEVICE — KIT ENDO TURNOVER/PROCEDURE CARRY-ON 101822